# Patient Record
Sex: FEMALE | Race: WHITE | Employment: OTHER | URBAN - METROPOLITAN AREA
[De-identification: names, ages, dates, MRNs, and addresses within clinical notes are randomized per-mention and may not be internally consistent; named-entity substitution may affect disease eponyms.]

---

## 2017-01-26 ENCOUNTER — OFFICE VISIT (OUTPATIENT)
Dept: FAMILY MEDICINE CLINIC | Facility: CLINIC | Age: 32
End: 2017-01-26

## 2017-01-26 VITALS
HEIGHT: 63 IN | HEART RATE: 80 BPM | BODY MASS INDEX: 34.55 KG/M2 | WEIGHT: 195 LBS | DIASTOLIC BLOOD PRESSURE: 72 MMHG | SYSTOLIC BLOOD PRESSURE: 145 MMHG | TEMPERATURE: 97 F

## 2017-01-26 DIAGNOSIS — M25.522 LEFT ELBOW PAIN: ICD-10-CM

## 2017-01-26 DIAGNOSIS — N60.02 CYST, BREAST, LEFT: ICD-10-CM

## 2017-01-26 DIAGNOSIS — R59.9 SWOLLEN LYMPH NODES: Primary | ICD-10-CM

## 2017-01-26 PROCEDURE — 99212 OFFICE O/P EST SF 10 MIN: CPT | Performed by: FAMILY MEDICINE

## 2017-01-26 PROCEDURE — 99214 OFFICE O/P EST MOD 30 MIN: CPT | Performed by: FAMILY MEDICINE

## 2017-01-26 RX ORDER — DEXAMETHASONE 4 MG/1
4 TABLET ORAL
Qty: 4 TABLET | Refills: 0 | Status: SHIPPED | OUTPATIENT
Start: 2017-01-26 | End: 2017-01-30

## 2017-01-26 NOTE — PROGRESS NOTES
Patient ID: Christa Staley is a 32year old female. HPI  Patient presents with:  Lump: Neck, left side.  pt twin brother has thyroid problems   Arm Pain: left arm near elbow     She states for 3 days now she noticed a lump on the left side of her ne Cardiovascular: Normal rate, regular rhythm and normal heart sounds. Pulmonary/Chest: Effort normal and breath sounds normal. No respiratory distress. Musculoskeletal:   Left elbow: Full range of motion. No effusion of the elbow.   There is no redne

## 2017-01-30 ENCOUNTER — NURSE NAVIGATOR ENCOUNTER (OUTPATIENT)
Dept: HEMATOLOGY/ONCOLOGY | Facility: HOSPITAL | Age: 32
End: 2017-01-30

## 2017-01-30 NOTE — PROGRESS NOTES
Called to [facility] to discuss scheduling 6 month follow-up breast imaging, as ordered by Dr. Lafaye Dubin. Patient's cousin initially translated Navigator's introduction.   Navigator explained option for a call back with a  on the line, as [place]

## 2017-02-08 ENCOUNTER — HOSPITAL ENCOUNTER (OUTPATIENT)
Dept: ULTRASOUND IMAGING | Facility: HOSPITAL | Age: 32
Discharge: HOME | End: 2017-02-08
Attending: FAMILY MEDICINE
Payer: COMMERCIAL

## 2017-02-08 DIAGNOSIS — N60.02 CYST, BREAST, LEFT: ICD-10-CM

## 2017-02-08 PROCEDURE — 76642 ULTRASOUND BREAST LIMITED: CPT

## 2017-02-22 ENCOUNTER — TELEPHONE (OUTPATIENT)
Dept: INTERNAL MEDICINE CLINIC | Facility: CLINIC | Age: 32
End: 2017-02-22

## 2017-02-22 NOTE — TELEPHONE ENCOUNTER
Spoke with patient and relayed doctor message in regards to test results. Verbalized understanding. Patient asking if she should do a follow up mammogram in six months or in twelve months.  Please advise / Thank You

## 2017-02-22 NOTE — TELEPHONE ENCOUNTER
Should see me in August 2017 and we will order it at that time as you last had your regular mammogram done in July 2016.

## 2017-05-02 ENCOUNTER — HOSPITAL ENCOUNTER (OUTPATIENT)
Dept: GENERAL RADIOLOGY | Age: 32
Discharge: HOME | End: 2017-05-02
Attending: FAMILY MEDICINE
Payer: COMMERCIAL

## 2017-05-02 ENCOUNTER — OFFICE VISIT (OUTPATIENT)
Dept: FAMILY MEDICINE CLINIC | Facility: CLINIC | Age: 32
End: 2017-05-02

## 2017-05-02 VITALS
DIASTOLIC BLOOD PRESSURE: 81 MMHG | HEART RATE: 82 BPM | SYSTOLIC BLOOD PRESSURE: 118 MMHG | BODY MASS INDEX: 34 KG/M2 | WEIGHT: 192.88 LBS

## 2017-05-02 DIAGNOSIS — G56.21 CUBITAL CANAL COMPRESSION SYNDROME, RIGHT: ICD-10-CM

## 2017-05-02 DIAGNOSIS — G56.00 CARPAL TUNNEL SYNDROME, UNSPECIFIED LATERALITY: ICD-10-CM

## 2017-05-02 DIAGNOSIS — G56.00 CARPAL TUNNEL SYNDROME, UNSPECIFIED LATERALITY: Primary | ICD-10-CM

## 2017-05-02 PROBLEM — G56.20: Status: ACTIVE | Noted: 2017-05-02

## 2017-05-02 PROCEDURE — 73130 X-RAY EXAM OF HAND: CPT

## 2017-05-02 PROCEDURE — 99213 OFFICE O/P EST LOW 20 MIN: CPT | Performed by: FAMILY MEDICINE

## 2017-05-02 PROCEDURE — 99212 OFFICE O/P EST SF 10 MIN: CPT | Performed by: FAMILY MEDICINE

## 2017-05-02 PROCEDURE — 73080 X-RAY EXAM OF ELBOW: CPT

## 2017-05-02 NOTE — PROGRESS NOTES
Blood pressure 118/81, pulse 82, weight 192 lb 14.4 oz (87.499 kg), last menstrual period 04/19/2017, not currently breastfeeding. Patient presents today complaining of right pinky discomfort for the past 3 weeks.   She reports she is unable to extend her

## 2017-06-02 ENCOUNTER — TELEPHONE (OUTPATIENT)
Dept: FAMILY MEDICINE CLINIC | Facility: CLINIC | Age: 32
End: 2017-06-02

## 2017-06-02 ENCOUNTER — OFFICE VISIT (OUTPATIENT)
Dept: NEUROLOGY | Facility: CLINIC | Age: 32
End: 2017-06-02

## 2017-06-02 DIAGNOSIS — G56.20 CUBITAL TUNNEL SYNDROME, UNSPECIFIED LATERALITY: Primary | ICD-10-CM

## 2017-06-02 DIAGNOSIS — G56.21 ULNAR NEUROPATHY OF RIGHT UPPER EXTREMITY: Primary | ICD-10-CM

## 2017-06-02 DIAGNOSIS — G56.00 CARPAL TUNNEL SYNDROME, UNSPECIFIED LATERALITY: ICD-10-CM

## 2017-06-02 PROCEDURE — 95886 MUSC TEST DONE W/N TEST COMP: CPT | Performed by: OTHER

## 2017-06-02 PROCEDURE — 95911 NRV CNDJ TEST 9-10 STUDIES: CPT | Performed by: OTHER

## 2017-06-02 NOTE — PROCEDURES
[address]  Phone: [phone]  Fax: [phone] REPORT          Patient: Mil Alexander YOB: 1985  Patient ID: [id number] Hand Dominance: Nerve / Sites Muscle Latency Amplitude Durat Segments Distance Lat Diff Velocity     ms mV ms  cm ms m/s   R MEDIAN - APB      Wrist APB 3.23 6.5 6.93 Wrist - APB 8        Elbow APB 6.98 5.8 6.88 Elbow - Wrist 22.5 3.75 60   R ULNAR - ADM      Wrist ADM 2.

## 2017-06-02 NOTE — TELEPHONE ENCOUNTER
Pt calling states she is waiting in office but will not be seen with out referral for Dr Karol Larson. Pt states she will have test for hands.

## 2017-06-02 NOTE — TELEPHONE ENCOUNTER
Spoke to managed care and they stated that in order for pt to have emg she needed a referral entered in. Spoke to pt and is aware of process. She is awaiting a call back.      Dr. Zelalem Frazier,     Please sign off on pended approval.

## 2017-06-06 NOTE — TELEPHONE ENCOUNTER
Spoke to pt and she stated she was aware referral had been authorized and got her procedure done. No further action necessary.

## 2017-06-10 ENCOUNTER — TELEPHONE (OUTPATIENT)
Dept: FAMILY MEDICINE CLINIC | Facility: CLINIC | Age: 32
End: 2017-06-10

## 2017-06-10 NOTE — TELEPHONE ENCOUNTER
Libyan speaking pt-Pt is calling to inquire about her results. Pt states she got a call from nurse.  Please advise

## 2017-06-13 ENCOUNTER — OFFICE VISIT (OUTPATIENT)
Dept: FAMILY MEDICINE CLINIC | Facility: CLINIC | Age: 32
End: 2017-06-13

## 2017-06-13 VITALS
BODY MASS INDEX: 33 KG/M2 | HEART RATE: 87 BPM | DIASTOLIC BLOOD PRESSURE: 64 MMHG | TEMPERATURE: 98 F | WEIGHT: 189 LBS | SYSTOLIC BLOOD PRESSURE: 103 MMHG

## 2017-06-13 DIAGNOSIS — J02.9 SORE THROAT: ICD-10-CM

## 2017-06-13 DIAGNOSIS — R52 BODY ACHES: Primary | ICD-10-CM

## 2017-06-13 PROCEDURE — 87880 STREP A ASSAY W/OPTIC: CPT | Performed by: FAMILY MEDICINE

## 2017-06-13 PROCEDURE — 99213 OFFICE O/P EST LOW 20 MIN: CPT | Performed by: FAMILY MEDICINE

## 2017-06-13 RX ORDER — AMOXICILLIN 875 MG/1
875 TABLET, COATED ORAL 2 TIMES DAILY
Qty: 20 TABLET | Refills: 0 | Status: SHIPPED | OUTPATIENT
Start: 2017-06-13 | End: 2017-06-28 | Stop reason: ALTCHOICE

## 2017-06-13 NOTE — TELEPHONE ENCOUNTER
Notes Recorded by Hughes Harada, LPN on 6/4/1418 at 79:35 AM  Normal hand and elbow xray letter mailed.   ------    Notes Recorded by Tania Lara DO on 5/3/2017 at 1:18 PM  Elbow and hand x-rays normal.

## 2017-06-14 NOTE — PROGRESS NOTES
HPI:    Patient ID: Jacquelin Sarmiento is a 28year old female. HPI Comments: Throat pain, body aches  fever all since today      Review of Systems   Constitutional: Negative. Negative for chills, diaphoresis, activity change and appetite change.    HEN

## 2017-06-28 ENCOUNTER — OFFICE VISIT (OUTPATIENT)
Dept: FAMILY MEDICINE CLINIC | Facility: CLINIC | Age: 32
End: 2017-06-28

## 2017-06-28 VITALS
SYSTOLIC BLOOD PRESSURE: 139 MMHG | BODY MASS INDEX: 34 KG/M2 | DIASTOLIC BLOOD PRESSURE: 91 MMHG | HEART RATE: 81 BPM | WEIGHT: 190 LBS

## 2017-06-28 DIAGNOSIS — M65.351 TRIGGER LITTLE FINGER OF RIGHT HAND: Primary | ICD-10-CM

## 2017-06-28 PROCEDURE — 99213 OFFICE O/P EST LOW 20 MIN: CPT | Performed by: FAMILY MEDICINE

## 2017-06-28 PROCEDURE — 99212 OFFICE O/P EST SF 10 MIN: CPT | Performed by: FAMILY MEDICINE

## 2017-06-28 RX ORDER — LEVOCETIRIZINE DIHYDROCHLORIDE 5 MG/1
5 TABLET, FILM COATED ORAL EVERY EVENING
Qty: 30 TABLET | Refills: 4 | Status: SHIPPED | OUTPATIENT
Start: 2017-06-28 | End: 2017-08-17 | Stop reason: ALTCHOICE

## 2017-06-28 NOTE — PROGRESS NOTES
Blood pressure 139/91, pulse 81, weight 190 lb (86.2 kg), last menstrual period 06/11/2017, not currently breastfeeding. Patient presents today complaining of itchy rash for the past 2 weeks. Began shortly after starting amoxicillin.   No change in soap

## 2017-07-05 ENCOUNTER — OFFICE VISIT (OUTPATIENT)
Dept: FAMILY MEDICINE CLINIC | Facility: CLINIC | Age: 32
End: 2017-07-05

## 2017-07-05 VITALS
HEART RATE: 77 BPM | TEMPERATURE: 98 F | WEIGHT: 191 LBS | SYSTOLIC BLOOD PRESSURE: 129 MMHG | DIASTOLIC BLOOD PRESSURE: 81 MMHG | HEIGHT: 63 IN | BODY MASS INDEX: 33.84 KG/M2

## 2017-07-05 DIAGNOSIS — IMO0001 ALLERGY, URTICARIA: Primary | ICD-10-CM

## 2017-07-05 PROCEDURE — 99212 OFFICE O/P EST SF 10 MIN: CPT | Performed by: FAMILY MEDICINE

## 2017-07-05 PROCEDURE — 96372 THER/PROPH/DIAG INJ SC/IM: CPT | Performed by: FAMILY MEDICINE

## 2017-07-05 PROCEDURE — 99214 OFFICE O/P EST MOD 30 MIN: CPT | Performed by: FAMILY MEDICINE

## 2017-07-05 RX ORDER — MOMETASONE FUROATE 1 MG/G
CREAM TOPICAL
Qty: 30 G | Refills: 1 | Status: SHIPPED | OUTPATIENT
Start: 2017-07-05 | End: 2017-12-26

## 2017-07-05 RX ORDER — PREDNISONE 10 MG/1
10 TABLET ORAL DAILY
Qty: 12 TABLET | Refills: 0 | Status: SHIPPED | OUTPATIENT
Start: 2017-07-05 | End: 2017-07-17 | Stop reason: ALTCHOICE

## 2017-07-05 RX ORDER — METHYLPREDNISOLONE ACETATE 80 MG/ML
80 INJECTION, SUSPENSION INTRA-ARTICULAR; INTRALESIONAL; INTRAMUSCULAR; SOFT TISSUE ONCE
Status: COMPLETED | OUTPATIENT
Start: 2017-07-05 | End: 2017-07-05

## 2017-07-05 RX ADMIN — METHYLPREDNISOLONE ACETATE 80 MG: 80 INJECTION, SUSPENSION INTRA-ARTICULAR; INTRALESIONAL; INTRAMUSCULAR; SOFT TISSUE at 08:43:00

## 2017-07-05 NOTE — PROGRESS NOTES
7/5/2017  8:27 AM    Adriano Helms is a 28year old female. Chief complaint(s): Patient presents with: Allergies: pt c/o itchiness all over body X 1 month    HPI:     Adriano Helms primary complaint is regarding rash.      Adriano Helms i Constitutional: Negative for chills, fatigue and fever. HENT: Negative for ear pain and sore throat. Respiratory: Negative for cough and wheezing. Cardiovascular: Negative for chest pain and palpitations.    Gastrointestinal: Negative for nausea, Disp Refills    predniSONE 10 MG Oral Tab 12 tablet 0      Sig: Take 1 tablet (10 mg total) by mouth daily.       Mometasone Furoate 0.1 % External Cream 30 g 1      Sig: Apply to affected area(s) BID       DepoMedrol 80 mg IM, Xyzal    RECOMMENDATIONS give

## 2017-07-17 ENCOUNTER — LAB ENCOUNTER (OUTPATIENT)
Dept: LAB | Age: 32
End: 2017-07-17
Attending: FAMILY MEDICINE
Payer: COMMERCIAL

## 2017-07-17 ENCOUNTER — OFFICE VISIT (OUTPATIENT)
Dept: FAMILY MEDICINE CLINIC | Facility: CLINIC | Age: 32
End: 2017-07-17

## 2017-07-17 VITALS
BODY MASS INDEX: 33.49 KG/M2 | TEMPERATURE: 99 F | DIASTOLIC BLOOD PRESSURE: 73 MMHG | HEIGHT: 63 IN | SYSTOLIC BLOOD PRESSURE: 123 MMHG | HEART RATE: 77 BPM | WEIGHT: 189 LBS

## 2017-07-17 DIAGNOSIS — IMO0001 ALLERGY, URTICARIA: ICD-10-CM

## 2017-07-17 DIAGNOSIS — N63.0 BREAST MASS: ICD-10-CM

## 2017-07-17 DIAGNOSIS — N60.12 FIBROCYSTIC DISEASE OF BOTH BREASTS: ICD-10-CM

## 2017-07-17 DIAGNOSIS — N60.11 FIBROCYSTIC DISEASE OF BOTH BREASTS: ICD-10-CM

## 2017-07-17 DIAGNOSIS — IMO0001 ALLERGY, URTICARIA: Primary | ICD-10-CM

## 2017-07-17 PROCEDURE — 86003 ALLG SPEC IGE CRUDE XTRC EA: CPT

## 2017-07-17 PROCEDURE — 99212 OFFICE O/P EST SF 10 MIN: CPT | Performed by: FAMILY MEDICINE

## 2017-07-17 PROCEDURE — 99214 OFFICE O/P EST MOD 30 MIN: CPT | Performed by: FAMILY MEDICINE

## 2017-07-17 PROCEDURE — 82785 ASSAY OF IGE: CPT

## 2017-07-17 NOTE — PROGRESS NOTES
7/17/2017  4:39 PM    Amarilis Watters is a 28year old female. Chief complaint(s): Patient presents with:  Breast Lump    HPI:     Amarilis Watters primary complaint is regarding breast mass and rash.      Patient presents complaining of left breast of Breath      ROS:   Review of Systems   Constitutional: Negative for chills, fatigue and fever. HENT: Negative for ear pain and sore throat. Respiratory: Negative for cough and wheezing. Cardiovascular: Negative for chest pain and palpitations. diagnosis)  Breast mass  Fibrocystic disease of both breasts    1. Allergy, urticaria  MEDICATIONS: CPM    LABORATORY & ORDERS:   Orders Placed This Encounter      Chiquita Norwood. Reg.  Prof  envirmental [E]      Allergy Adult Foof Profile, Reflex [E]

## 2017-07-18 LAB
A ALTERNATA IGE QN: <0.1 KUA/L (ref ?–0.1)
C HERBARUM IGE QN: <0.1 KUA/L (ref ?–0.1)
CAT DANDER IGE QN: <0.1 KUA/L (ref ?–0.1)
CLAM IGE QN: <0.1 KUA/L (ref ?–0.1)
CODFISH IGE QN: <0.1 KUA/L (ref ?–0.1)
COMMON RAGWEED IGE QN: <0.1 KUA/L (ref ?–0.1)
CORN IGE QN: <0.1 KUA/L (ref ?–0.1)
COW MILK IGE QN: <0.1 KUA/L (ref ?–0.1)
D FARINAE IGE QN: <0.1 KUA/L (ref ?–0.1)
DOG DANDER IGE QN: <0.1 KUA/L (ref ?–0.1)
EGG WHITE IGE QN: <0.1 KUA/L (ref ?–0.1)
GOOSEFOOT IGE QN: <0.1 KUA/L (ref ?–0.1)
HOUSE DUST HS IGE QN: <0.1 KUA/L (ref ?–0.1)
IGE SERPL-ACNC: 11.6 KU/L (ref 2–214)
IGE SERPL-ACNC: 11.6 KU/L (ref 2–214)
KENT BLUE GRASS IGE QN: <0.1 KUA/L (ref ?–0.1)
PEANUT IGE QN: <0.1 KUA/L (ref ?–0.1)
PER RYE GRASS IGE QN: <0.1 KUA/L (ref ?–0.1)
SCALLOP IGE QN: <0.1 KUA/L (ref ?–0.1)
SESAME SEED IGE QN: <0.1 KUA/L (ref ?–0.1)
SHRIMP IGE QN: <0.1 KUA/L (ref ?–0.1)
SOYBEAN IGE QN: <0.1 KUA/L (ref ?–0.1)
WALNUT IGE QN: <0.1 KUA/L (ref ?–0.1)
WHEAT IGE QN: <0.1 KUA/L (ref ?–0.1)
WHITE ELM IGE QN: <0.1 KUA/L (ref ?–0.1)
WHITE OAK IGE QN: <0.1 KUA/L (ref ?–0.1)

## 2017-07-20 ENCOUNTER — TELEPHONE (OUTPATIENT)
Dept: FAMILY MEDICINE CLINIC | Facility: CLINIC | Age: 32
End: 2017-07-20

## 2017-07-20 NOTE — TELEPHONE ENCOUNTER
----- Message from Yeny Tirado MD sent at 7/18/2017  7:07 PM CDT -----  Please call patient, results are within normal limits.

## 2017-07-27 ENCOUNTER — HOSPITAL ENCOUNTER (OUTPATIENT)
Dept: ULTRASOUND IMAGING | Facility: HOSPITAL | Age: 32
Discharge: HOME | End: 2017-07-27
Attending: FAMILY MEDICINE
Payer: COMMERCIAL

## 2017-07-27 DIAGNOSIS — N60.12 FIBROCYSTIC DISEASE OF BOTH BREASTS: ICD-10-CM

## 2017-07-27 DIAGNOSIS — N60.11 FIBROCYSTIC DISEASE OF BOTH BREASTS: ICD-10-CM

## 2017-07-27 DIAGNOSIS — N63.0 BREAST MASS: ICD-10-CM

## 2017-07-27 PROCEDURE — 76642 ULTRASOUND BREAST LIMITED: CPT | Performed by: FAMILY MEDICINE

## 2017-08-04 ENCOUNTER — TELEPHONE (OUTPATIENT)
Dept: FAMILY MEDICINE CLINIC | Facility: CLINIC | Age: 32
End: 2017-08-04

## 2017-08-04 ENCOUNTER — HOSPITAL ENCOUNTER (OUTPATIENT)
Age: 32
Discharge: HOME | End: 2017-08-04
Attending: EMERGENCY MEDICINE
Payer: COMMERCIAL

## 2017-08-04 VITALS
TEMPERATURE: 98 F | HEART RATE: 69 BPM | OXYGEN SATURATION: 100 % | WEIGHT: 188 LBS | HEIGHT: 64 IN | SYSTOLIC BLOOD PRESSURE: 133 MMHG | BODY MASS INDEX: 32.1 KG/M2 | DIASTOLIC BLOOD PRESSURE: 83 MMHG | RESPIRATION RATE: 16 BRPM

## 2017-08-04 DIAGNOSIS — R30.0 DYSURIA: ICD-10-CM

## 2017-08-04 DIAGNOSIS — N30.90 CYSTITIS: Primary | ICD-10-CM

## 2017-08-04 DIAGNOSIS — R30.0 DYSURIA: Primary | ICD-10-CM

## 2017-08-04 LAB
B-HCG UR QL: NEGATIVE
URINE BILIRUBIN: NEGATIVE
URINE GLUCOSE: NEGATIVE MG/DL
URINE KETONES: NEGATIVE MG/DL
URINE NITRITE: NEGATIVE
URINE PH: 8.5
URINE PROTEIN: NEGATIVE MG /DL
URINE SPECIFIC GRAVITY: 1.01

## 2017-08-04 PROCEDURE — 99214 OFFICE O/P EST MOD 30 MIN: CPT

## 2017-08-04 PROCEDURE — 81025 URINE PREGNANCY TEST: CPT

## 2017-08-04 PROCEDURE — 81002 URINALYSIS NONAUTO W/O SCOPE: CPT

## 2017-08-04 PROCEDURE — 87086 URINE CULTURE/COLONY COUNT: CPT | Performed by: EMERGENCY MEDICINE

## 2017-08-04 RX ORDER — CIPROFLOXACIN 500 MG/1
500 TABLET, FILM COATED ORAL 2 TIMES DAILY
Qty: 14 TABLET | Refills: 0 | Status: SHIPPED | OUTPATIENT
Start: 2017-08-04 | End: 2017-08-11 | Stop reason: ALTCHOICE

## 2017-08-04 RX ORDER — PHENAZOPYRIDINE HYDROCHLORIDE 200 MG/1
200 TABLET, FILM COATED ORAL 3 TIMES DAILY
Qty: 6 TABLET | Refills: 0 | Status: SHIPPED | OUTPATIENT
Start: 2017-08-04 | End: 2017-08-06

## 2017-08-04 NOTE — TELEPHONE ENCOUNTER
Received  [facility] orders via [facility] to order stat urinalysis and culture. Labs generated and informed pt on md orders. She will complete them at [facility] Lab dept. Tasked to  [facility] for signoff.

## 2017-08-04 NOTE — ED INITIAL ASSESSMENT (HPI)
Called office and was told to drop urine off at lab fo u/a. Complained for dysuria for 2 days. Decided to come here now she has abd pain. Told md she had hematuria. And would call with results. No acute apparent distress.

## 2017-08-04 NOTE — ED PROVIDER NOTES
Patient presents with:  Abdomen/Flank Pain (GI/)      HPI:     Christa Staley is a 28year old female who presents with a chief complaint of an increased frequency of urination. She has had rare past episodes of UTI. Onset of symptoms was yesterday. HCl 200 MG Oral Tab          Sig: Take 1 tablet (200 mg total) by mouth 3 (three) times daily.           Dispense:  6 tablet          Refill:  0    Labs performed this visit:    Recent Results (from the past 10 hour(s))  -[facility] POCT URINALYSIS DIPSTICK MANUAL

## 2017-08-04 NOTE — TELEPHONE ENCOUNTER
FYI - Turkish Speaking    Patient called in wanting to make an appointment with Dr. Ed Genao - out of the office, other physicians are completely booked. Patient stated she woke up feeling sick - mentioned that she is also having problems urinating.  Please advis

## 2017-08-04 NOTE — TELEPHONE ENCOUNTER
Actions Requested: Pt needs appt for urinary issues. Problem: Dysuria, urgency, frequency with lite pink urine. Onset and Timin days. Associated Symptoms: N/A  Aggravating by: N/A  Alleviated by: Nothing s/sx worsening.   Triage Note: Pt c/o 2 days d

## 2017-08-04 NOTE — ED NOTES
Urine culture obtained and sent to lab. Discharge inst reviewed and increase po fluids void after sex [facility] call and follow up with pcp in 3 days fill and take prescriptions. Go to the ed for new or worse concerns abd pain fever .

## 2017-08-07 NOTE — TELEPHONE ENCOUNTER
Urinalysis results in epic, but culture not seen. I spoke with Mariette Opitz in reference lab and she will fax lab results to [facility] fax [phone]. Tasked to [facility] for  [facility].

## 2017-08-11 ENCOUNTER — OFFICE VISIT (OUTPATIENT)
Dept: FAMILY MEDICINE CLINIC | Facility: CLINIC | Age: 32
End: 2017-08-11

## 2017-08-11 ENCOUNTER — LAB ENCOUNTER (OUTPATIENT)
Dept: LAB | Age: 32
End: 2017-08-11
Attending: FAMILY MEDICINE
Payer: COMMERCIAL

## 2017-08-11 ENCOUNTER — APPOINTMENT (OUTPATIENT)
Dept: LAB | Age: 32
End: 2017-08-11
Attending: FAMILY MEDICINE
Payer: COMMERCIAL

## 2017-08-11 VITALS
WEIGHT: 189 LBS | BODY MASS INDEX: 33.49 KG/M2 | SYSTOLIC BLOOD PRESSURE: 124 MMHG | TEMPERATURE: 98 F | DIASTOLIC BLOOD PRESSURE: 81 MMHG | HEART RATE: 69 BPM | HEIGHT: 63 IN

## 2017-08-11 DIAGNOSIS — Z11.3 SCREEN FOR STD (SEXUALLY TRANSMITTED DISEASE): ICD-10-CM

## 2017-08-11 DIAGNOSIS — Z00.00 PHYSICAL EXAM: ICD-10-CM

## 2017-08-11 DIAGNOSIS — N63.0 BREAST MASS: ICD-10-CM

## 2017-08-11 DIAGNOSIS — Z00.00 PHYSICAL EXAM: Primary | ICD-10-CM

## 2017-08-11 LAB
ALBUMIN SERPL BCP-MCNC: 4.1 G/DL (ref 3.5–4.8)
ALBUMIN/GLOB SERPL: 1.3 {RATIO} (ref 1–2)
ALP SERPL-CCNC: 68 U/L (ref 32–100)
ALT SERPL-CCNC: 17 U/L (ref 14–54)
ANION GAP SERPL CALC-SCNC: 6 MMOL/L (ref 0–18)
AST SERPL-CCNC: 16 U/L (ref 15–41)
BASOPHILS # BLD: 0 K/UL (ref 0–0.2)
BASOPHILS NFR BLD: 1 %
BILIRUB SERPL-MCNC: 0.5 MG/DL (ref 0.3–1.2)
BILIRUB UR QL: NEGATIVE
BUN SERPL-MCNC: 7 MG/DL (ref 8–20)
BUN/CREAT SERPL: 10.4 (ref 10–20)
CALCIUM SERPL-MCNC: 9.3 MG/DL (ref 8.5–10.5)
CHLORIDE SERPL-SCNC: 103 MMOL/L (ref 95–110)
CHOLEST SERPL-MCNC: 218 MG/DL (ref 110–200)
CO2 SERPL-SCNC: 26 MMOL/L (ref 22–32)
COLOR UR: YELLOW
CREAT SERPL-MCNC: 0.67 MG/DL (ref 0.5–1.5)
EOSINOPHIL # BLD: 0.1 K/UL (ref 0–0.7)
EOSINOPHIL NFR BLD: 2 %
ERYTHROCYTE [DISTWIDTH] IN BLOOD BY AUTOMATED COUNT: 13.1 % (ref 11–15)
GLOBULIN PLAS-MCNC: 3.2 G/DL (ref 2.5–3.7)
GLUCOSE SERPL-MCNC: 108 MG/DL (ref 70–99)
GLUCOSE UR-MCNC: NEGATIVE MG/DL
HAV AB SER QL IA: REACTIVE
HBA1C MFR BLD: 5.4 % (ref 4–6)
HBV CORE AB SERPL QL IA: NONREACTIVE
HBV SURFACE AB SER-ACNC: <3.1 MIU/ML (ref ?–10)
HBV SURFACE AG SERPL QL IA: NONREACTIVE
HBV SURFACE AG SERPL QL IA: NONREACTIVE
HCT VFR BLD AUTO: 38.3 % (ref 35–48)
HCV AB SERPL QL IA: NONREACTIVE
HDLC SERPL-MCNC: 41 MG/DL
HGB BLD-MCNC: 13.2 G/DL (ref 12–16)
HGB UR QL STRIP.AUTO: NEGATIVE
HIV1+2 AB SERPL QL IA: NONREACTIVE
KETONES UR-MCNC: NEGATIVE MG/DL
LDLC SERPL CALC-MCNC: 153 MG/DL (ref 0–99)
LEUKOCYTE ESTERASE UR QL STRIP.AUTO: NEGATIVE
LYMPHOCYTES # BLD: 1.8 K/UL (ref 1–4)
LYMPHOCYTES NFR BLD: 29 %
MCH RBC QN AUTO: 30.9 PG (ref 27–32)
MCHC RBC AUTO-ENTMCNC: 34.5 G/DL (ref 32–37)
MCV RBC AUTO: 89.6 FL (ref 80–100)
MONOCYTES # BLD: 0.4 K/UL (ref 0–1)
MONOCYTES NFR BLD: 6 %
NEUTROPHILS # BLD AUTO: 3.9 K/UL (ref 1.8–7.7)
NEUTROPHILS NFR BLD: 63 %
NITRITE UR QL STRIP.AUTO: NEGATIVE
NONHDLC SERPL-MCNC: 177 MG/DL
OSMOLALITY UR CALC.SUM OF ELEC: 279 MOSM/KG (ref 275–295)
PH UR: 7 [PH] (ref 5–8)
PLATELET # BLD AUTO: 293 K/UL (ref 140–400)
PMV BLD AUTO: 8.5 FL (ref 7.4–10.3)
POTASSIUM SERPL-SCNC: 3.8 MMOL/L (ref 3.3–5.1)
PROT SERPL-MCNC: 7.3 G/DL (ref 5.9–8.4)
PROT UR-MCNC: 30 MG/DL
RBC # BLD AUTO: 4.28 M/UL (ref 3.7–5.4)
RBC #/AREA URNS AUTO: 0 /HPF
RBC #/AREA URNS AUTO: <1 /HPF
SODIUM SERPL-SCNC: 135 MMOL/L (ref 136–144)
SP GR UR STRIP: 1.02 (ref 1–1.03)
TRIGL SERPL-MCNC: 122 MG/DL (ref 1–149)
TSH SERPL-ACNC: 0.78 UIU/ML (ref 0.45–5.33)
UROBILINOGEN UR STRIP-ACNC: <2
VIT C UR-MCNC: NEGATIVE MG/DL
WBC # BLD AUTO: 6.2 K/UL (ref 4–11)
WBC #/AREA URNS AUTO: 1 /HPF
WBC #/AREA URNS AUTO: <1 /HPF

## 2017-08-11 PROCEDURE — 99395 PREV VISIT EST AGE 18-39: CPT | Performed by: FAMILY MEDICINE

## 2017-08-11 PROCEDURE — 36415 COLL VENOUS BLD VENIPUNCTURE: CPT

## 2017-08-11 PROCEDURE — 84443 ASSAY THYROID STIM HORMONE: CPT

## 2017-08-11 PROCEDURE — 87340 HEPATITIS B SURFACE AG IA: CPT

## 2017-08-11 PROCEDURE — 87389 HIV-1 AG W/HIV-1&-2 AB AG IA: CPT

## 2017-08-11 PROCEDURE — 86696 HERPES SIMPLEX TYPE 2 TEST: CPT

## 2017-08-11 PROCEDURE — 93005 ELECTROCARDIOGRAM TRACING: CPT

## 2017-08-11 PROCEDURE — 81015 MICROSCOPIC EXAM OF URINE: CPT | Performed by: FAMILY MEDICINE

## 2017-08-11 PROCEDURE — 86704 HEP B CORE ANTIBODY TOTAL: CPT

## 2017-08-11 PROCEDURE — 80500 HEPATITIS A B + C PROFILE: CPT

## 2017-08-11 PROCEDURE — 81001 URINALYSIS AUTO W/SCOPE: CPT

## 2017-08-11 PROCEDURE — 86706 HEP B SURFACE ANTIBODY: CPT

## 2017-08-11 PROCEDURE — 80061 LIPID PANEL: CPT

## 2017-08-11 PROCEDURE — 86695 HERPES SIMPLEX TYPE 1 TEST: CPT

## 2017-08-11 PROCEDURE — 86780 TREPONEMA PALLIDUM: CPT

## 2017-08-11 PROCEDURE — 86709 HEPATITIS A IGM ANTIBODY: CPT

## 2017-08-11 PROCEDURE — 80053 COMPREHEN METABOLIC PANEL: CPT

## 2017-08-11 PROCEDURE — 86694 HERPES SIMPLEX NES ANTBDY: CPT

## 2017-08-11 PROCEDURE — 93010 ELECTROCARDIOGRAM REPORT: CPT | Performed by: FAMILY MEDICINE

## 2017-08-11 PROCEDURE — 86708 HEPATITIS A ANTIBODY: CPT

## 2017-08-11 PROCEDURE — 86803 HEPATITIS C AB TEST: CPT

## 2017-08-11 PROCEDURE — 83036 HEMOGLOBIN GLYCOSYLATED A1C: CPT

## 2017-08-11 PROCEDURE — 85025 COMPLETE CBC W/AUTO DIFF WBC: CPT

## 2017-08-11 NOTE — PROGRESS NOTES
8/11/2017  9:02 AM    Marcy Lowery is a 28year old female. Chief complaint(s): Patient presents with:  Routine Physical  Gyn Exam    HPI:     Marcy Lowery primary complaint is regarding CPE.      Marcy Lowery is a 28year old female pr Allergies:    Amoxicillin             Rash  Ibuprofen               Shortness of Breath      ROS:   Review of Systems   Constitutional: Negative for appetite change, chills, diaphoresis, fatigue and fever.    HENT: Negative for ear pain, hearing loss, rhythm and S2 normal.  Exam reveals no gallop and no friction rub. No murmur heard. Pulmonary/Chest: No respiratory distress. Right breast exhibits no mass, no nipple discharge and no skin change. Left breast exhibits mass and tenderness.  Left breast e (A) Clear   Glucose, Urine Negative Negative mg/dL   Bilirubin, Urine Negative Negative   Ketone, Urine Negative Negative mg/dL   Specific Gravity, Urine 1.010 1.005 - 1.030   Blood, Urine Large (A) Negative   PH, Urine 8.5 (A) 5.0 - 8.0   Protein urine Ne mass     Normal recent mammo/breast ultrasound studies.   1. Physical exam  Assessment and Plan:     [address] checkup as follows:    LABORATORY & ORDERS:   Orders Placed This Encounter      -II [E]      CBC W Differential W Platelet [ Although recommended, the patient refuses the following: none . FOLLOW-UP: Schedule a follow-up visit in 12 months.            Orders This Visit:    Orders Placed This Encounter      -II [E]      CBC W Differential W Platelet [E]      Comp [employer]

## 2017-08-12 LAB
HAV IGM SERPL QL IA: NONREACTIVE
HSV 1 GLYCOPROTEIN G AB, IGG: 33.8 IV
HSV 2 GLYCOPROTEIN G AB, IGG: 3.62 IV

## 2017-08-12 NOTE — PROGRESS NOTES
Please notify patient that his/her cholesterol levels were slight elevated. However, it only requires to follow a low cholesterol / low fat diet, exercise and recheck in 12 months.

## 2017-08-13 LAB
C TRACH DNA SPEC QL NAA+PROBE: NEGATIVE
HSV TYPE 1/2 COMBINED AB, IGG: >22.4 IV
HSV TYPE 1/2 COMBINED ABS, IGM: 0.86 IV
N GONORRHOEA DNA SPEC QL NAA+PROBE: NEGATIVE

## 2017-08-14 LAB
HPV I/H RISK 1 DNA SPEC QL NAA+PROBE: NEGATIVE
T PALLIDUM AB SER QL: NEGATIVE

## 2017-08-17 ENCOUNTER — APPOINTMENT (OUTPATIENT)
Dept: LAB | Age: 32
End: 2017-08-17
Attending: FAMILY MEDICINE
Payer: COMMERCIAL

## 2017-08-17 ENCOUNTER — OFFICE VISIT (OUTPATIENT)
Dept: FAMILY MEDICINE CLINIC | Facility: CLINIC | Age: 32
End: 2017-08-17

## 2017-08-17 VITALS
HEIGHT: 63 IN | DIASTOLIC BLOOD PRESSURE: 67 MMHG | HEART RATE: 67 BPM | BODY MASS INDEX: 33.31 KG/M2 | TEMPERATURE: 98 F | SYSTOLIC BLOOD PRESSURE: 125 MMHG | WEIGHT: 188 LBS

## 2017-08-17 DIAGNOSIS — E78.00 PURE HYPERCHOLESTEROLEMIA: Primary | ICD-10-CM

## 2017-08-17 DIAGNOSIS — B00.9 HSV INFECTION: ICD-10-CM

## 2017-08-17 PROCEDURE — 86304 IMMUNOASSAY TUMOR CA 125: CPT | Performed by: FAMILY MEDICINE

## 2017-08-17 PROCEDURE — 99214 OFFICE O/P EST MOD 30 MIN: CPT | Performed by: FAMILY MEDICINE

## 2017-08-17 PROCEDURE — 99212 OFFICE O/P EST SF 10 MIN: CPT | Performed by: FAMILY MEDICINE

## 2017-08-17 RX ORDER — CIPROFLOXACIN 500 MG/1
TABLET, FILM COATED ORAL
Refills: 0 | COMMUNITY
Start: 2017-08-04 | End: 2017-12-26

## 2017-08-17 NOTE — PROGRESS NOTES
8/17/2017  4:32 PM    Fabrice Gerber is a 28year old female.     Chief complaint(s): Patient presents with:  Test Results: Patient here to discuss her results from her annual physical    HPI:     Fabrice Gerber primary complaint is regarding abnl l Oral Tab TK 1 T PO BID Disp:  Rfl: 0       Allergies:    Amoxicillin             Rash  Ibuprofen               Shortness of Breath      ROS:   Review of Systems   Constitutional: Negative for chills, fatigue and fever.    HENT: Negative for ear pain and sor placed or performed in visit on 99/20/77  -COMP METABOLIC PANEL (14)   Result Value Ref Range   Glucose 108 (H) 70 - 99 mg/dL   Sodium 135 (L) 136 - 144 mmol/L   Potassium 3.8 3.3 - 5.1 mmol/L   Chloride 103 95 - 110 mmol/L   CO2 26 22 - 32 mmol/L   BUN 7 Hepatitis B Core Antibody Nonreactive  Nonreactive    Hepatitis B Surface AB Qual Nonreactive  Nonreactive    Hepatitis B Surface AB Quant <3.10 <10.00 mIU/mL   Hepatitis C Virus Antibody Nonreactive  Nonreactive   -HERPES SIMPLEX 1/2, IGM/IGG W/RFLX GLY taker the medication nightly. Weight reduction plan was discussed. Furthermore, patient was informed to call our office with any questions or concerns.  Notify Dr Chilango Panda or the [facility] if there is a deterioration or worsening of the medical condit

## 2017-08-18 LAB — CANCER AG125 SERPL-ACNC: 21 U/ML (ref 0–35)

## 2017-12-20 ENCOUNTER — NURSE TRIAGE (OUTPATIENT)
Dept: OTHER | Age: 32
End: 2017-12-20

## 2017-12-20 NOTE — TELEPHONE ENCOUNTER
Action Requested: Summary for Provider     []  Critical Lab, Recommendations Needed  [] Need Additional Advice  [x]   FYI    []   Need Orders  [] Need Medications Sent to Pharmacy  []  Other     SUMMARY: Advised ER per protocol and pt agrees.  Will be drive

## 2017-12-21 NOTE — TELEPHONE ENCOUNTER
Pt stated that she did not go to ER. But she will go now as she still has the pain.  Thanks  RN pls f/u tomorrow

## 2017-12-23 NOTE — TELEPHONE ENCOUNTER
F/u call made. Patient went to  [facility] ER. Labs done, and normal. DX with Gastritis.  (per patient she tried prilosec and not effective.) was prescribed Tramadol for pain. Recommend f/u appt to re-evaluate and possible referral to GI.   Kaila

## 2017-12-26 ENCOUNTER — APPOINTMENT (OUTPATIENT)
Dept: LAB | Age: 32
End: 2017-12-26
Attending: FAMILY MEDICINE
Payer: COMMERCIAL

## 2017-12-26 ENCOUNTER — OFFICE VISIT (OUTPATIENT)
Dept: FAMILY MEDICINE CLINIC | Facility: CLINIC | Age: 32
End: 2017-12-26

## 2017-12-26 VITALS
TEMPERATURE: 98 F | BODY MASS INDEX: 32.96 KG/M2 | SYSTOLIC BLOOD PRESSURE: 121 MMHG | DIASTOLIC BLOOD PRESSURE: 72 MMHG | HEIGHT: 63 IN | WEIGHT: 186 LBS | HEART RATE: 79 BPM

## 2017-12-26 DIAGNOSIS — R10.13 EPIGASTRIC ABDOMINAL PAIN: Primary | ICD-10-CM

## 2017-12-26 DIAGNOSIS — R10.13 EPIGASTRIC ABDOMINAL PAIN: ICD-10-CM

## 2017-12-26 DIAGNOSIS — K44.9 HIATAL HERNIA: ICD-10-CM

## 2017-12-26 PROCEDURE — 99212 OFFICE O/P EST SF 10 MIN: CPT | Performed by: FAMILY MEDICINE

## 2017-12-26 PROCEDURE — 99214 OFFICE O/P EST MOD 30 MIN: CPT | Performed by: FAMILY MEDICINE

## 2017-12-26 PROCEDURE — 83013 H PYLORI (C-13) BREATH: CPT

## 2017-12-26 RX ORDER — OMEPRAZOLE 40 MG/1
40 CAPSULE, DELAYED RELEASE ORAL DAILY
Qty: 90 CAPSULE | Refills: 0 | Status: SHIPPED | OUTPATIENT
Start: 2017-12-26 | End: 2018-07-25

## 2017-12-26 RX ORDER — ACETAMINOPHEN 500 MG
650 TABLET ORAL EVERY 6 HOURS PRN
COMMUNITY
End: 2018-07-25

## 2017-12-26 NOTE — PROGRESS NOTES
Patient ID: Kassidy Perdomo is a 28year old female.     HPI  Patient presents with:  ER F/U: gastritis    [place] RN      []Manual[]Template  []Copied  Action Requested: Summary for Provider      []  Critical Lab, Recommendations Needed She denies any pain in the back. In 2015 she had a CAT scan. It showed small hiatal hernia versus esophageal dysmotility and/or acid reflux.       Wt Readings from Last 6 Encounters:  12/26/17 : 186 lb (84.4 kg)  08/17/17 : 188 lb (85.3 kg)  08/11/17 : Abdominal: Normal appearance and bowel sounds are normal. There is positive tenderness in the epigastric area only. There is no rigidity, no rebound, no guarding and negative  Aviles's sign.    Neurological: Patient is alert and oriented to person, place,

## 2017-12-30 NOTE — H&P
[address] [facility] [address] Gastroenterology                                                                                                  Clinic History and Physical     Pa ASA    History, Medications, Allergies, ROS:      History reviewed. No pertinent past medical history.    Past Surgical History:  2005:   2012: ELECTROCARDIOGRAM, COMPLETE      Comment: scanned to media   Family Hx:   Family History   Problem moist  CV: regular rate and rhythm  Lung: moves air well; no labored breathing  Abdomen: soft, obese however no distension appreciated, mild RUQ ttp > epigastric ttp without rigidity or guarding, +NABS  Back: No CVA tenderness  Skin: dry, warm, no jaundice Referrals:  US ABDOMEN COMPLETE ([id number]=[id number])       Amie Solis PA-C  1/2/2018

## 2018-01-02 ENCOUNTER — APPOINTMENT (OUTPATIENT)
Dept: LAB | Facility: HOSPITAL | Age: 33
End: 2018-01-02
Attending: PHYSICIAN ASSISTANT
Payer: COMMERCIAL

## 2018-01-02 ENCOUNTER — OFFICE VISIT (OUTPATIENT)
Dept: GASTROENTEROLOGY | Facility: CLINIC | Age: 33
End: 2018-01-02

## 2018-01-02 VITALS
BODY MASS INDEX: 32.03 KG/M2 | SYSTOLIC BLOOD PRESSURE: 117 MMHG | HEART RATE: 79 BPM | WEIGHT: 187.63 LBS | DIASTOLIC BLOOD PRESSURE: 74 MMHG | HEIGHT: 64 IN

## 2018-01-02 DIAGNOSIS — R10.13 EPIGASTRIC PAIN: ICD-10-CM

## 2018-01-02 DIAGNOSIS — R12 HEARTBURN: ICD-10-CM

## 2018-01-02 DIAGNOSIS — R10.13 EPIGASTRIC PAIN: Primary | ICD-10-CM

## 2018-01-02 LAB
ALBUMIN SERPL BCP-MCNC: 4.2 G/DL (ref 3.5–4.8)
ALP SERPL-CCNC: 71 U/L (ref 32–100)
ALT SERPL-CCNC: 17 U/L (ref 14–54)
AST SERPL-CCNC: 16 U/L (ref 15–41)
BILIRUB DIRECT SERPL-MCNC: 0 MG/DL (ref 0–0.2)
BILIRUB SERPL-MCNC: 0.5 MG/DL (ref 0.3–1.2)
PROT SERPL-MCNC: 7.8 G/DL (ref 5.9–8.4)

## 2018-01-02 PROCEDURE — 99244 OFF/OP CNSLTJ NEW/EST MOD 40: CPT | Performed by: PHYSICIAN ASSISTANT

## 2018-01-02 PROCEDURE — 80076 HEPATIC FUNCTION PANEL: CPT

## 2018-01-02 PROCEDURE — 99212 OFFICE O/P EST SF 10 MIN: CPT | Performed by: PHYSICIAN ASSISTANT

## 2018-01-02 PROCEDURE — 36415 COLL VENOUS BLD VENIPUNCTURE: CPT

## 2018-01-02 RX ORDER — SUCRALFATE 1 G/1
1 TABLET ORAL
Qty: 120 TABLET | Refills: 0 | Status: SHIPPED | OUTPATIENT
Start: 2018-01-02 | End: 2018-02-28

## 2018-01-10 ENCOUNTER — HOSPITAL ENCOUNTER (OUTPATIENT)
Dept: ULTRASOUND IMAGING | Age: 33
Discharge: HOME | End: 2018-01-10
Attending: PHYSICIAN ASSISTANT
Payer: COMMERCIAL

## 2018-01-10 DIAGNOSIS — R10.13 EPIGASTRIC PAIN: ICD-10-CM

## 2018-01-10 PROCEDURE — 76700 US EXAM ABDOM COMPLETE: CPT | Performed by: PHYSICIAN ASSISTANT

## 2018-01-12 ENCOUNTER — TELEPHONE (OUTPATIENT)
Dept: GASTROENTEROLOGY | Facility: CLINIC | Age: 33
End: 2018-01-12

## 2018-01-12 NOTE — TELEPHONE ENCOUNTER
Discussed with patient over the phone about the results of [address]. Uncertain sludge, she is feeling better as long as she does not drink too much coffee as she previously did.  She has an appointment to see me on 1/30 and will CPM and return at that time for fu

## 2018-02-28 ENCOUNTER — HOSPITAL ENCOUNTER (OUTPATIENT)
Age: 33
Discharge: HOME | End: 2018-02-28
Attending: EMERGENCY MEDICINE
Payer: COMMERCIAL

## 2018-02-28 VITALS
TEMPERATURE: 98 F | HEART RATE: 71 BPM | BODY MASS INDEX: 31 KG/M2 | SYSTOLIC BLOOD PRESSURE: 122 MMHG | WEIGHT: 183 LBS | OXYGEN SATURATION: 100 % | DIASTOLIC BLOOD PRESSURE: 77 MMHG | RESPIRATION RATE: 16 BRPM

## 2018-02-28 DIAGNOSIS — J06.9 VIRAL URI WITH COUGH: Primary | ICD-10-CM

## 2018-02-28 LAB — S PYO AG THROAT QL: NEGATIVE

## 2018-02-28 PROCEDURE — 99213 OFFICE O/P EST LOW 20 MIN: CPT

## 2018-02-28 PROCEDURE — 99214 OFFICE O/P EST MOD 30 MIN: CPT

## 2018-02-28 PROCEDURE — 87430 STREP A AG IA: CPT

## 2018-02-28 RX ORDER — BENZONATATE 100 MG/1
100 CAPSULE ORAL 3 TIMES DAILY PRN
Qty: 30 CAPSULE | Refills: 0 | Status: SHIPPED | OUTPATIENT
Start: 2018-02-28 | End: 2018-04-25 | Stop reason: ALTCHOICE

## 2018-03-01 NOTE — ED PROVIDER NOTES
Patient Seen in: [facility] In [address] [place] [address]    History   Patient presents with:  Cough/URI    Stated Complaint: cough    HPI    The patient is a 70-year-old female with no significant past medical history presents now with cough, sore t upper and lower extremities bilaterally  Extremities: No focal swelling or tenderness  Skin: No pallor, no redness or warmth to the touch      ED Course     Labs Reviewed   [facility] POCT RAPID STREP - Normal       ED Course as of Feb 28 1816  ------------------

## 2018-04-25 ENCOUNTER — OFFICE VISIT (OUTPATIENT)
Dept: FAMILY MEDICINE CLINIC | Facility: CLINIC | Age: 33
End: 2018-04-25

## 2018-04-25 VITALS
DIASTOLIC BLOOD PRESSURE: 73 MMHG | SYSTOLIC BLOOD PRESSURE: 114 MMHG | HEIGHT: 64 IN | WEIGHT: 186 LBS | HEART RATE: 73 BPM | TEMPERATURE: 97 F | BODY MASS INDEX: 31.76 KG/M2

## 2018-04-25 DIAGNOSIS — N63.20 BREAST MASS, LEFT: ICD-10-CM

## 2018-04-25 DIAGNOSIS — N64.4 BREAST PAIN, LEFT: Primary | ICD-10-CM

## 2018-04-25 PROCEDURE — 99214 OFFICE O/P EST MOD 30 MIN: CPT | Performed by: FAMILY MEDICINE

## 2018-04-25 PROCEDURE — 99212 OFFICE O/P EST SF 10 MIN: CPT | Performed by: FAMILY MEDICINE

## 2018-04-25 RX ORDER — MEFENAMIC ACID 250 MG/1
250 CAPSULE ORAL EVERY 8 HOURS PRN
Qty: 30 CAPSULE | Refills: 1 | Status: SHIPPED | OUTPATIENT
Start: 2018-04-25 | End: 2018-05-05

## 2018-04-25 NOTE — PROGRESS NOTES
4/25/2018  11:04 AM    Ainsley Ryan is a 28year old female. Chief complaint(s): Patient presents with:  Breast Pain: bilateral breast pain, states that pain comes and goes.  She states that she has fibroma and cyst    HPI:     Shelbi worley Allergies:    Amoxicillin             Rash  Ibuprofen               Shortness of Breath      ROS:   Review of Systems   Constitutional: Negative for chills, fatigue and fever. HENT: Negative for ear pain and sore throat.     Respiratory: Negative fo BREAST LEFT COMPLETE ([id number]). RECOMMENDATIONS given include: Please, call our office with any questions or concerns. Notify Dr Bibi Weiner or the [facility] if there is a deterioration or worsening of the medical condition.  Also, inform the doc

## 2018-05-02 ENCOUNTER — HOSPITAL ENCOUNTER (OUTPATIENT)
Dept: ULTRASOUND IMAGING | Facility: HOSPITAL | Age: 33
Discharge: HOME | End: 2018-05-02
Attending: FAMILY MEDICINE
Payer: COMMERCIAL

## 2018-05-02 DIAGNOSIS — N64.4 BREAST PAIN, LEFT: ICD-10-CM

## 2018-05-02 DIAGNOSIS — N63.20 BREAST MASS, LEFT: ICD-10-CM

## 2018-05-02 PROCEDURE — 76642 ULTRASOUND BREAST LIMITED: CPT | Performed by: FAMILY MEDICINE

## 2018-07-25 ENCOUNTER — NURSE TRIAGE (OUTPATIENT)
Dept: OTHER | Age: 33
End: 2018-07-25

## 2018-07-25 ENCOUNTER — OFFICE VISIT (OUTPATIENT)
Dept: FAMILY MEDICINE CLINIC | Facility: CLINIC | Age: 33
End: 2018-07-25

## 2018-07-25 VITALS
SYSTOLIC BLOOD PRESSURE: 120 MMHG | WEIGHT: 185 LBS | HEART RATE: 78 BPM | BODY MASS INDEX: 31.58 KG/M2 | DIASTOLIC BLOOD PRESSURE: 78 MMHG | HEIGHT: 64 IN

## 2018-07-25 DIAGNOSIS — R30.0 DYSURIA: Primary | ICD-10-CM

## 2018-07-25 LAB
APPEARANCE: CLEAR
MULTISTIX LOT#: NORMAL NUMERIC
PH, URINE: 7 (ref 4.5–8)
SPECIFIC GRAVITY: 1.01 (ref 1–1.03)
URINE-COLOR: YELLOW
UROBILINOGEN,SEMI-QN: 0.2 MG/DL (ref 0–1.9)

## 2018-07-25 PROCEDURE — 81003 URINALYSIS AUTO W/O SCOPE: CPT | Performed by: NURSE PRACTITIONER

## 2018-07-25 PROCEDURE — 99213 OFFICE O/P EST LOW 20 MIN: CPT | Performed by: NURSE PRACTITIONER

## 2018-07-25 RX ORDER — NITROFURANTOIN 25; 75 MG/1; MG/1
100 CAPSULE ORAL 2 TIMES DAILY
Qty: 14 CAPSULE | Refills: 0 | Status: SHIPPED | OUTPATIENT
Start: 2018-07-25 | End: 2018-10-19

## 2018-07-25 NOTE — PROGRESS NOTES
HPI  Pt presents with burning with urination since this am.  Denies fever. Denies flank or back pain. Review of Systems   Constitutional: Negative for activity change and fever. Genitourinary: Positive for dysuria.  Negative for bladder incontinence, Musculoskeletal: She exhibits no tenderness. Skin: Skin is dry. Psychiatric: She has a normal mood and affect.  Her behavior is normal. Judgment and thought content normal.     urine dip -sm leuks and  sm blood  Assessment and Plan:  Problem List Item

## 2018-07-25 NOTE — PATIENT INSTRUCTIONS
URINARY TRACT INFECTION    -encourage fluids 8-12 glasses of non-caffeinated fluids/day  -encourage drinking cranberry juice  -urinate after intercourse  -keep good hygiene, avoid harsh soaps and lotions to perineal area  -females-wipe front to back  -kenrick

## 2018-07-25 NOTE — TELEPHONE ENCOUNTER
Action Requested: Summary for Provider     []  Critical Lab, Recommendations Needed  [] Need Additional Advice  [x]   FYI    []   Need Orders  [] Need Medications Sent to Pharmacy  []  Other     SUMMARY: Pt c/o one day dysuria with frequency, urgency and h

## 2018-10-19 ENCOUNTER — NURSE TRIAGE (OUTPATIENT)
Dept: OTHER | Age: 33
End: 2018-10-19

## 2018-10-19 ENCOUNTER — OFFICE VISIT (OUTPATIENT)
Dept: FAMILY MEDICINE CLINIC | Facility: CLINIC | Age: 33
End: 2018-10-19

## 2018-10-19 VITALS
WEIGHT: 185 LBS | BODY MASS INDEX: 31.58 KG/M2 | HEIGHT: 64 IN | SYSTOLIC BLOOD PRESSURE: 120 MMHG | DIASTOLIC BLOOD PRESSURE: 84 MMHG | HEART RATE: 71 BPM | TEMPERATURE: 98 F

## 2018-10-19 DIAGNOSIS — N30.00 ACUTE CYSTITIS WITHOUT HEMATURIA: Primary | ICD-10-CM

## 2018-10-19 PROCEDURE — 99213 OFFICE O/P EST LOW 20 MIN: CPT | Performed by: FAMILY MEDICINE

## 2018-10-19 PROCEDURE — 99212 OFFICE O/P EST SF 10 MIN: CPT | Performed by: FAMILY MEDICINE

## 2018-10-19 RX ORDER — PHENAZOPYRIDINE HYDROCHLORIDE 200 MG/1
200 TABLET, FILM COATED ORAL 3 TIMES DAILY PRN
Qty: 10 TABLET | Refills: 0 | Status: SHIPPED | OUTPATIENT
Start: 2018-10-19 | End: 2018-11-26

## 2018-10-19 RX ORDER — CIPROFLOXACIN 500 MG/1
500 TABLET, FILM COATED ORAL 2 TIMES DAILY
Qty: 20 TABLET | Refills: 0 | Status: SHIPPED | OUTPATIENT
Start: 2018-10-19 | End: 2018-10-29

## 2018-10-19 NOTE — PROGRESS NOTES
10/19/2018  11:54 AM    Fabrice Gerber is a 35year old female. Chief complaint(s): Patient presents with:  UTI    HPI:     Fabrice Gerber primary complaint is regarding dysuria. Patient to be evaluated for possible urinary tract infection. BREATH      ROS:   Review of Systems   Constitutional: Negative for chills, fatigue and fever. HENT: Negative for ear pain and sore throat. Respiratory: Negative for cough and wheezing. Cardiovascular: Negative for chest pain and palpitations.    [place] UROBILINOGEN,SEMI-QN 0.2 0.0 - 1.9 mg/dL    NITRITE, URINE neg Negative    LEUKOCYTES trace Negative    APPEARANCE clear Clear    URINE-COLOR yellow Yellow    [employer] Lot# [id number] Numeric    Mult[employer] Expiration Date 4/30/19 Date   URINE CULTURE, ROUTINE 200 MG Oral Tab 10 tablet 0     Sig: Take 1 tablet (200 mg total) by mouth 3 (three) times daily as needed for Pain.        Imaging & Referrals:  None         Corey Gerard MD

## 2018-11-26 ENCOUNTER — OFFICE VISIT (OUTPATIENT)
Dept: FAMILY MEDICINE CLINIC | Facility: CLINIC | Age: 33
End: 2018-11-26

## 2018-11-26 VITALS
SYSTOLIC BLOOD PRESSURE: 124 MMHG | BODY MASS INDEX: 32 KG/M2 | HEART RATE: 66 BPM | DIASTOLIC BLOOD PRESSURE: 81 MMHG | WEIGHT: 188 LBS

## 2018-11-26 DIAGNOSIS — B07.8 COMMON WART: Primary | ICD-10-CM

## 2018-11-26 PROCEDURE — 99212 OFFICE O/P EST SF 10 MIN: CPT | Performed by: FAMILY MEDICINE

## 2018-11-26 PROCEDURE — 99213 OFFICE O/P EST LOW 20 MIN: CPT | Performed by: FAMILY MEDICINE

## 2018-11-26 RX ORDER — OMEPRAZOLE 20 MG/1
20 CAPSULE, DELAYED RELEASE ORAL
COMMUNITY
End: 2019-03-12

## 2018-11-26 NOTE — PROGRESS NOTES
11/26/2018  1:22 PM    Keerthi Orellana is a 35year old female. Chief complaint(s): Patient presents with:  Derm Problem: warts on hand    HPI:     Keerthi Reyna primary complaint is regarding as above.      Keerthi Orellana is a 35year old fe palpitations. Gastrointestinal: Negative for nausea, vomiting, abdominal pain, diarrhea and constipation. Musculoskeletal: Negative for back pain. Skin: Positive for rash. Neurological: Negative for seizures and headaches.    Psychiatric/Behavioral: instructed on wound care by cleaning area(s) once or twice a day, applying antibiotic ointment as well as post-op medications for pain control. Follow up appointment was given in 3 weeks.       Teresa Dickson MD      LABORATORY RESULTS:      EKG / [place]

## 2018-12-08 NOTE — LETTER
AUTHORIZATION FOR SURGICAL OPERATION OR OTHER PROCEDURE    1. I hereby authorize Dr. Shania Chowdhury, and [place] [employer] [place] [facility] staff assigned to my case to perform the following operation and/or procedure at the [facility], [facility]:    _______________________________________________________________________________________________    ENDOSEE WITH IUD REMOVAL/ PARAGUARD IUD INSERTION  _______________________________________________________________________________________________    2. My physician has explained the nature and purpose of the operation or other procedure, possible alternative methods of treatment, the risks involved, and the possibility of complication to me. I acknowledge that no guarantee has been made as to the result that may be obtained. 3.  I recognize that, during the course of this operation, or other procedure, unforseen conditions may necessitate additional or different procedure than those listed above. I, therefore, further authorize and request that the above named physician, his/her physician assistants or designees perform such procedures as are, in his/her professional opinion, necessary and desirable. 4.  Any tissue or organs removed in the operation or other procedure may be disposed of by and at the discretion of the [facility], [facility] and [facility]. 5.  I understand that in the event of a medical emergency, I will be transported by local paramedics to [facility] or other hospital emergency department. 6.  I certify that I have read and fully understand the above consent to operation and/or other procedure. 7.  I acknowledge that my physician has explained sedation/analgesia administration to me including the risks and benefits. I consent to the administration of sedation/analgesia as may be necessary or desirable in the judgement of my physician.     Witness signature: ___________________________________________________ Date: Telephone Encounter by Karena Foss RN at 06/14/17 04:42 PM     Author:  Karena Foss RN Service:  (none) Author Type:  Registered Nurse     Filed:  06/19/17 05:41 PM Encounter Date:  5/22/2017 Status:  Addendum     :  Karena Foss RN (Registered Nurse)            Left message on voice mail that we received the information from [place] for the referral and will get it entered.  The surgery scheduler will call us when a date is schedule[DM1.1M]d[DM1.2M].[DM1.1M]        Revision History        User Key Date/Time User Provider Type Action    > DM1.2 06/19/17 05:41 PM Karena Foss RN Registered Nurse Addend     DM1.1 06/14/17 04:43 PM Karena Foss RN Registered Nurse Sign    M - Manual             ______/______/_____                    Time:  ________ A. M.  P.M. Patient Name:  ______________________________________________________  (please print)      Patient signature:  ___________________________________________________             Relationship to Patient:           []  Parent    Responsible person                          []  Spouse  In case of minor or                    [] Other  _____________   Incompetent name:  __________________________________________________                               (please print)      _____________      Responsible person  In case of minor or  Incompetent signature:  _______________________________________________    Statement of Physician  My signature below affirms that prior to the time of the procedure, I have explained to the patient and/or his/her guardian, the risks and benefits involved in the proposed treatment and any reasonable alternative to the proposed treatment. I have also explained the risks and benefits involved in the refusal of the proposed treatment and have answered the patient's questions.                         Date:  ______/______/_______  Provider                      Signature:  __________________________________________________________       Time:  ___________ A.M    P.M.

## 2019-01-04 ENCOUNTER — HOSPITAL ENCOUNTER (OUTPATIENT)
Dept: GENERAL RADIOLOGY | Age: 34
Discharge: HOME | End: 2019-01-04
Attending: NURSE PRACTITIONER
Payer: COMMERCIAL

## 2019-01-04 ENCOUNTER — OFFICE VISIT (OUTPATIENT)
Dept: FAMILY MEDICINE CLINIC | Facility: CLINIC | Age: 34
End: 2019-01-04

## 2019-01-04 VITALS — HEIGHT: 64 IN | BODY MASS INDEX: 32.78 KG/M2 | WEIGHT: 192 LBS

## 2019-01-04 DIAGNOSIS — S20.212A CHEST WALL CONTUSION, LEFT, INITIAL ENCOUNTER: ICD-10-CM

## 2019-01-04 DIAGNOSIS — S20.212A CHEST WALL CONTUSION, LEFT, INITIAL ENCOUNTER: Primary | ICD-10-CM

## 2019-01-04 PROCEDURE — 99213 OFFICE O/P EST LOW 20 MIN: CPT | Performed by: NURSE PRACTITIONER

## 2019-01-04 PROCEDURE — 71101 X-RAY EXAM UNILAT RIBS/CHEST: CPT | Performed by: NURSE PRACTITIONER

## 2019-01-04 RX ORDER — CYCLOBENZAPRINE HCL 5 MG
5 TABLET ORAL 3 TIMES DAILY PRN
Qty: 30 TABLET | Refills: 0 | Status: SHIPPED | OUTPATIENT
Start: 2019-01-04 | End: 2019-03-12

## 2019-01-04 NOTE — PATIENT INSTRUCTIONS
Contusión en el pecho    Carolina Fujita contusión (contusion) es un hematoma, o magulladura (bruise), en la piel, los músculos o las costillas. Puede causar dolor, sensibilidad, hinchazón y verse amoratado (de color ryan).  Las contusiones tardan [employer] unos pocos · Ashly Parra  · Falta de aire, dificultad para respirar o respiración acelerada  · Aumenta el dolor en el pecho al respirar  · Dolor paige que aparece de repente o que dura más de [place]  Date Last Reviewed: 10/4/2017  © 5691-6153 [facility] · [employer] medicamentos que le hayan recetado.  Si no le recetaron ninguno, tome paracetamol (acetaminofén), ibuprofeno o naproxeno para aliviar el dolor.   Visitas de control  Julieta un seguimiento con almonte proveedor de [facility] nickie la [employer]

## 2019-01-04 NOTE — ASSESSMENT & PLAN NOTE
Left rib xrays  Extra strength tylenol 1 po qid prn  Flexeril 5 mg I po q 8 hrs prn-will cause drowsiness  Ice to affected areas 20 min 4-6 times per day    Please call if symptoms worsen or are not resolving.

## 2019-01-04 NOTE — PROGRESS NOTES
HPI  Pt presents for f/u from fall on stairs one week ago-was going up stairs and fell onto her left side. Has pain in left post neck, upper back and left side and sternal and left chest wall pain.      Review of Systems   Constitutional: Positive for activ Sleep Concern: Not Asked        Stress Concern: Not Asked        Weight Concern: Not Asked        Special Diet: Not Asked        Back Care: Not Asked        Exercise: Not Asked        Bike Helmet: Not Asked        Seat Belt: Not Asked        Self-Exa Discussed plan of care with pt and pt is in agreement. All questions answered. Pt to call with questions or concerns. Encouraged to sign up for My Chart if not already registered.

## 2019-01-05 ENCOUNTER — HOSPITAL ENCOUNTER (EMERGENCY)
Facility: HOSPITAL | Age: 34
Discharge: HOME | End: 2019-01-05
Attending: EMERGENCY MEDICINE
Payer: COMMERCIAL

## 2019-01-05 ENCOUNTER — APPOINTMENT (OUTPATIENT)
Dept: CT IMAGING | Facility: HOSPITAL | Age: 34
End: 2019-01-05
Attending: EMERGENCY MEDICINE
Payer: COMMERCIAL

## 2019-01-05 VITALS
HEART RATE: 68 BPM | RESPIRATION RATE: 18 BRPM | SYSTOLIC BLOOD PRESSURE: 121 MMHG | BODY MASS INDEX: 32.78 KG/M2 | WEIGHT: 192 LBS | DIASTOLIC BLOOD PRESSURE: 82 MMHG | HEIGHT: 64 IN | TEMPERATURE: 98 F | OXYGEN SATURATION: 100 %

## 2019-01-05 DIAGNOSIS — S20.211A CONTUSION OF RIB ON RIGHT SIDE, INITIAL ENCOUNTER: ICD-10-CM

## 2019-01-05 DIAGNOSIS — S30.1XXA CONTUSION OF ABDOMINAL WALL, INITIAL ENCOUNTER: Primary | ICD-10-CM

## 2019-01-05 LAB — B-HCG UR QL: NEGATIVE

## 2019-01-05 PROCEDURE — 74176 CT ABD & PELVIS W/O CONTRAST: CPT | Performed by: EMERGENCY MEDICINE

## 2019-01-05 PROCEDURE — 99284 EMERGENCY DEPT VISIT MOD MDM: CPT

## 2019-01-05 PROCEDURE — 81025 URINE PREGNANCY TEST: CPT

## 2019-01-05 NOTE — ED PROVIDER NOTES
Patient Seen in: [facility] AND [facility] Emergency Department    History   Patient presents with:  Abdominal Pain    Stated Complaint: abd pain    HPI    Patient complains of luq abdominal pain that began 1.5 weeks ago fell hit abd/ribs.   Pain described as ach moist  Respiratory: there are no retractions, lungs are clear to auscultation, tender l ant lower ribs  Cardiovascular: regular rate and rhythm    Gastrointestinal: soft, tender along lower costal margin and luq, no guarding no peritoneal signs,  Neurologi Clinical Impression:  Contusion of abdominal wall, initial encounter  (primary encounter diagnosis)  Contusion of rib on right side, initial encounter    Disposition:  Discharge    Follow-up:  Dulce Arevalo DO  [address] [facility]

## 2019-01-05 NOTE — ED INITIAL ASSESSMENT (HPI)
Left upper quadrant abd pain x1.5 weeks, pain worse today. Saw pmd yesterday for this and had xr done and was negative. States she fell 2 weeks ago and then started having this pain.

## 2019-02-28 ENCOUNTER — NURSE TRIAGE (OUTPATIENT)
Dept: OTHER | Age: 34
End: 2019-02-28

## 2019-02-28 RX ORDER — ONDANSETRON 8 MG/1
8 TABLET, ORALLY DISINTEGRATING ORAL EVERY 8 HOURS PRN
Qty: 15 TABLET | Refills: 0 | Status: SHIPPED | OUTPATIENT
Start: 2019-02-28 | End: 2019-03-12

## 2019-02-28 NOTE — TELEPHONE ENCOUNTER
Action Requested: Summary for Provider     []  Critical Lab, Recommendations Needed  [] Need Additional Advice  []   FYI    []   Need Orders  [] Need Medications Sent to Pharmacy  []  Other     SUMMARY: pt states that she began with nausea, vomiting and di

## 2019-03-12 ENCOUNTER — OFFICE VISIT (OUTPATIENT)
Dept: FAMILY MEDICINE CLINIC | Facility: CLINIC | Age: 34
End: 2019-03-12

## 2019-03-12 VITALS
HEART RATE: 75 BPM | DIASTOLIC BLOOD PRESSURE: 78 MMHG | SYSTOLIC BLOOD PRESSURE: 123 MMHG | WEIGHT: 192 LBS | BODY MASS INDEX: 32.78 KG/M2 | TEMPERATURE: 99 F | HEIGHT: 64 IN

## 2019-03-12 DIAGNOSIS — J11.1 INFLUENZA: Primary | ICD-10-CM

## 2019-03-12 PROCEDURE — 99213 OFFICE O/P EST LOW 20 MIN: CPT | Performed by: NURSE PRACTITIONER

## 2019-03-12 RX ORDER — OSELTAMIVIR PHOSPHATE 75 MG/1
75 CAPSULE ORAL 2 TIMES DAILY
Qty: 10 CAPSULE | Refills: 0 | Status: SHIPPED | OUTPATIENT
Start: 2019-03-12 | End: 2019-03-17

## 2019-03-12 NOTE — PROGRESS NOTES
HPI  Pt here for fever (max 103 yesterday); chills, fatigue headache, coughing-s/s started yesterday. Review of Systems   Constitutional: Positive for activity change, chills and fatigue.  Negative for appetite change, fever and unexpected weight change Financial resource strain: Not on file      Food insecurity:        Worry: Not on file        Inability: Not on file      Transportation needs:        Medical: Not on file        Non-medical: Not on file    Tobacco Use      Smoking status: Never Smoker person, place, and time. She appears well-developed and well-nourished. She appears ill. No distress. HENT:   Head: Normocephalic and atraumatic.    Right Ear: Tympanic membrane and ear canal normal. No cerumen present  Left Ear: Tympanic membrane and ear

## 2019-03-12 NOTE — PATIENT INSTRUCTIONS
La gripe  [employer] (en inglés, “the flu”) es roland infección que afecta las vías respiratorias (la boca, la nariz, los pulmones y los conductos que los comunican). A diferencia de un resfriado, la gripe puede ser muy grave para la sandor y llegar a producir · Gardenia musculares     Factores que pueden empeorar la gripe  En [employer], la gripe puede ser muy grave. El riesgo de complicaciones es mayor para:  · Niños menores de 5 años de [place]. · Adultos mayores de 72 años de edad.   · Personas que tienen · Permanezca en almonte casa hasta por lo menos 24 horas después de que haya desparecido la fiebre o los escalofríos. Asegúrese de que la fiebre no esté escondida por medicametnos que reduzcan la [place].   · No comparta comida, utensilios, vasos o cepillos de di Roland de las mejores maneras de prevenir muchas infecciones frecuentes es lavarse las clau. Si usted está cuidando o visitando a roland persona con gripe, [place] las clau cada vez que entre y salga de la habitación.  Siga estos pasos:  · Use agua tibia y [address] Date Last Reviewed: 8/27/2014  © 5536-6515 [employer] [address]. [address] [facility], [facility]. Todos los derechos reservados.  Esta información no pretende sustituir la atención médica profesional. Sólo almonte médico puede diagnosticar y trata

## 2019-03-12 NOTE — ASSESSMENT & PLAN NOTE
Start tamiflu 75 mg I po bid x 5 days  Supportive care discussed to help alleviate symptoms  Please call if symptoms worsen or are not resolving.

## 2019-04-17 ENCOUNTER — OFFICE VISIT (OUTPATIENT)
Dept: FAMILY MEDICINE CLINIC | Facility: CLINIC | Age: 34
End: 2019-04-17

## 2019-04-17 VITALS
HEIGHT: 64 IN | RESPIRATION RATE: 20 BRPM | BODY MASS INDEX: 32.61 KG/M2 | SYSTOLIC BLOOD PRESSURE: 111 MMHG | WEIGHT: 191 LBS | DIASTOLIC BLOOD PRESSURE: 75 MMHG | HEART RATE: 61 BPM | TEMPERATURE: 98 F

## 2019-04-17 DIAGNOSIS — E66.9 OBESITY (BMI 30-39.9): Primary | ICD-10-CM

## 2019-04-17 PROCEDURE — 99212 OFFICE O/P EST SF 10 MIN: CPT | Performed by: FAMILY MEDICINE

## 2019-04-17 PROCEDURE — 99213 OFFICE O/P EST LOW 20 MIN: CPT | Performed by: FAMILY MEDICINE

## 2019-04-17 NOTE — PROGRESS NOTES
Blood pressure 111/75, pulse 61, temperature 97.6 °F (36.4 °C), temperature source Oral, resp. rate 20, height 5' 4\" (1.626 m), weight 191 lb (86.6 kg), not currently breastfeeding.     Patient presents today complaining of swelling of her legs bilaterally

## 2019-05-08 ENCOUNTER — OFFICE VISIT (OUTPATIENT)
Dept: FAMILY MEDICINE CLINIC | Facility: CLINIC | Age: 34
End: 2019-05-08

## 2019-05-08 VITALS
SYSTOLIC BLOOD PRESSURE: 120 MMHG | DIASTOLIC BLOOD PRESSURE: 78 MMHG | TEMPERATURE: 97 F | HEART RATE: 69 BPM | WEIGHT: 191 LBS | BODY MASS INDEX: 32.61 KG/M2 | RESPIRATION RATE: 20 BRPM | HEIGHT: 64 IN

## 2019-05-08 DIAGNOSIS — S60.212A CONTUSION OF LEFT WRIST, INITIAL ENCOUNTER: Primary | ICD-10-CM

## 2019-05-08 PROCEDURE — 99213 OFFICE O/P EST LOW 20 MIN: CPT | Performed by: NURSE PRACTITIONER

## 2019-05-08 NOTE — PATIENT INSTRUCTIONS
Contusiones (moretones)    Marcelle contusión es un moretón. Un moretón se forma cuando norah recibe un golpe que no rompe la piel juanita sí los vasos sanguíneos que están debajo. La ricardo que se derrama de los vasos rotos provoca enrojecimiento e hinchazón.  A nirav A veces un moretón empeora en vez de mejorar, y puede aumentar de tamaño e hincharse más. [place] puede suceder cuando el cuerpo retiene roland pequeña acumulación de ricardo debajo de la piel (hematoma).  En ciertos casos muy raros, podría ser necesario que almonte mé

## 2019-05-08 NOTE — PROGRESS NOTES
HPI    Patient presents with swelling of the right wrist.  States that she was at the gym yesterday working out with an elastic band and that it slapped on her wrist.  Feels like her finger are numb.   No loss of ROM in wrist.      Review of Systems   Const connections:        Talks on phone: Not on file        Gets together: Not on file        Attends Uatsdin service: Not on file        Active member of club or organization: Not on file        Attends meetings of clubs or organizations: Not on file Psychiatric: She has a normal mood and affect.  Her behavior is normal. Judgment and thought content normal.       Assessment and Plan:  Problem List Items Addressed This Visit        Musculoskeletal    Contusion of left wrist - Primary     Extra strength

## 2019-06-26 ENCOUNTER — HOSPITAL ENCOUNTER (EMERGENCY)
Facility: HOSPITAL | Age: 34
Discharge: HOME | End: 2019-06-26
Attending: EMERGENCY MEDICINE
Payer: COMMERCIAL

## 2019-06-26 ENCOUNTER — APPOINTMENT (OUTPATIENT)
Dept: CT IMAGING | Facility: HOSPITAL | Age: 34
End: 2019-06-26
Attending: EMERGENCY MEDICINE
Payer: COMMERCIAL

## 2019-06-26 ENCOUNTER — APPOINTMENT (OUTPATIENT)
Dept: MRI IMAGING | Facility: HOSPITAL | Age: 34
End: 2019-06-26
Attending: EMERGENCY MEDICINE
Payer: COMMERCIAL

## 2019-06-26 VITALS
SYSTOLIC BLOOD PRESSURE: 104 MMHG | RESPIRATION RATE: 12 BRPM | DIASTOLIC BLOOD PRESSURE: 66 MMHG | OXYGEN SATURATION: 98 % | HEIGHT: 64 IN | BODY MASS INDEX: 31.58 KG/M2 | HEART RATE: 65 BPM | WEIGHT: 185 LBS | TEMPERATURE: 98 F

## 2019-06-26 DIAGNOSIS — R20.2 PARESTHESIAS: Primary | ICD-10-CM

## 2019-06-26 PROCEDURE — 80048 BASIC METABOLIC PNL TOTAL CA: CPT | Performed by: EMERGENCY MEDICINE

## 2019-06-26 PROCEDURE — 70450 CT HEAD/BRAIN W/O DYE: CPT | Performed by: EMERGENCY MEDICINE

## 2019-06-26 PROCEDURE — 36415 COLL VENOUS BLD VENIPUNCTURE: CPT

## 2019-06-26 PROCEDURE — 85025 COMPLETE CBC W/AUTO DIFF WBC: CPT | Performed by: EMERGENCY MEDICINE

## 2019-06-26 PROCEDURE — 93005 ELECTROCARDIOGRAM TRACING: CPT

## 2019-06-26 PROCEDURE — 93010 ELECTROCARDIOGRAM REPORT: CPT | Performed by: EMERGENCY MEDICINE

## 2019-06-26 PROCEDURE — 99284 EMERGENCY DEPT VISIT MOD MDM: CPT

## 2019-06-27 NOTE — ED PROVIDER NOTES
Patient Seen in: [facility] AND [facility] Emergency Department    History   Patient presents with:  Numbness Weakness (neurologic)    Stated Complaint: pt was working out today at 4p.  after workout pt had numbness and pain to Left *    HPI    27-year-old female Temp 98.1 °F (36.7 °C)   Temp src Oral   SpO2 100 %   O2 Device None (Room air)       Current:/66   Pulse 65   Temp 98.1 °F (36.7 °C) (Oral)   Resp 12   Ht 162.6 cm (5' 4\")   Wt 83.9 kg   LMP 06/25/2019   SpO2 98%   BMI 31.76 kg/m²         Physical RAINBOW DRAW LIGHT GREEN   RAINBOW DRAW GOLD   CBC W/ DIFFERENTIAL     EKG    Rate, intervals and axes as noted on EKG Report. Rate: 77  Rhythm: Sinus Rhythm  Reading: no stemi, interpreted by ed physician. MDM    CBC BMP unremarkable.   P

## 2019-06-27 NOTE — ED INITIAL ASSESSMENT (HPI)
C/o tingling to left side of face and left arm since 1600 while working out. CSS negative in triage. +dizziness.

## 2019-07-03 ENCOUNTER — OFFICE VISIT (OUTPATIENT)
Dept: FAMILY MEDICINE CLINIC | Facility: CLINIC | Age: 34
End: 2019-07-03

## 2019-07-03 ENCOUNTER — HOSPITAL ENCOUNTER (OUTPATIENT)
Dept: GENERAL RADIOLOGY | Age: 34
Discharge: HOME | End: 2019-07-03
Attending: FAMILY MEDICINE
Payer: COMMERCIAL

## 2019-07-03 VITALS
HEART RATE: 59 BPM | BODY MASS INDEX: 31.76 KG/M2 | DIASTOLIC BLOOD PRESSURE: 72 MMHG | HEIGHT: 64 IN | SYSTOLIC BLOOD PRESSURE: 121 MMHG | TEMPERATURE: 98 F | WEIGHT: 186 LBS

## 2019-07-03 DIAGNOSIS — M54.50 ACUTE MIDLINE LOW BACK PAIN WITHOUT SCIATICA: ICD-10-CM

## 2019-07-03 DIAGNOSIS — M54.50 ACUTE MIDLINE LOW BACK PAIN WITHOUT SCIATICA: Primary | ICD-10-CM

## 2019-07-03 PROCEDURE — 72110 X-RAY EXAM L-2 SPINE 4/>VWS: CPT | Performed by: FAMILY MEDICINE

## 2019-07-03 PROCEDURE — 99213 OFFICE O/P EST LOW 20 MIN: CPT | Performed by: FAMILY MEDICINE

## 2019-07-03 RX ORDER — METAXALONE 800 MG/1
800 TABLET ORAL 3 TIMES DAILY
Qty: 30 TABLET | Refills: 1 | Status: SHIPPED | OUTPATIENT
Start: 2019-07-03 | End: 2019-07-23

## 2019-07-03 NOTE — PROGRESS NOTES
7/3/2019  1:00 PM    Reba Sampson is a 29year old female.     Chief complaint(s): Patient presents with:  Back Pain: low back pain that radiates to upper back and upper extremities, x 1 week   Numbness: left arm numbness that radiates to face     HPI Allergies:    Amoxicillin             RASH  Ibuprofen               SHORTNESS OF BREATH      ROS:   Review of Systems   Constitutional: Negative for chills, fatigue and fever. Respiratory: Negative for shortness of breath.     Cardiovascular: Negati The gray-white matter junction is preserved and bilaterally symmetric in appearance. CEREBELLUM: No edema, hemorrhage, mass, acute infarction, or significant atrophy. BRAINSTEM: No edema, hemorrhage, mass, acute infarction, or significant atrophy.   Aidan Pyle Disp Refills   • Metaxalone (SKELAXIN) 800 MG Oral Tab 30 tablet 1     Sig: Take 1 tablet (800 mg total) by mouth 3 (three) times daily for 20 days.        Imaging & Referrals:  None         Shilpi Loera MD

## 2019-07-08 ENCOUNTER — TELEPHONE (OUTPATIENT)
Dept: FAMILY MEDICINE CLINIC | Facility: CLINIC | Age: 34
End: 2019-07-08

## 2019-07-11 ENCOUNTER — TELEPHONE (OUTPATIENT)
Dept: FAMILY MEDICINE CLINIC | Facility: CLINIC | Age: 34
End: 2019-07-11

## 2019-07-11 NOTE — TELEPHONE ENCOUNTER
RN confirmed with staff at [facility] that ok to schedule appointment for patient with Dr. Zhao Velez, as long as patient was seen before. Appointment scheduled.     Future Appointments   Date Time Provider Gil Walls   7/18/2019  9:15 AM Jagdish

## 2019-07-11 NOTE — TELEPHONE ENCOUNTER
Talked with patient via Language line,  Aron, [address]. Patient calling regarding continued back pain. Patient is taking the skelaxin with only minimal relief. Patient rates her pain a 7/10, last dose of the pain med was last night.   Praxair

## 2019-07-18 ENCOUNTER — OFFICE VISIT (OUTPATIENT)
Dept: FAMILY MEDICINE CLINIC | Facility: CLINIC | Age: 34
End: 2019-07-18

## 2019-07-18 VITALS
WEIGHT: 187 LBS | BODY MASS INDEX: 31.92 KG/M2 | HEIGHT: 64 IN | SYSTOLIC BLOOD PRESSURE: 124 MMHG | TEMPERATURE: 98 F | HEART RATE: 91 BPM | DIASTOLIC BLOOD PRESSURE: 80 MMHG

## 2019-07-18 DIAGNOSIS — G89.29 OTHER CHRONIC BACK PAIN: Primary | ICD-10-CM

## 2019-07-18 DIAGNOSIS — M54.9 OTHER CHRONIC BACK PAIN: Primary | ICD-10-CM

## 2019-07-18 PROCEDURE — 99213 OFFICE O/P EST LOW 20 MIN: CPT | Performed by: FAMILY MEDICINE

## 2019-07-18 PROCEDURE — 97810 ACUP 1/> WO ESTIM 1ST 15 MIN: CPT | Performed by: FAMILY MEDICINE

## 2019-07-18 NOTE — PROGRESS NOTES
HPI:    Patient ID: Michelle White is a 29year old female. Fatigue. .        Review of Systems   Constitutional: Positive for fatigue. Negative for activity change, appetite change, chills, diaphoresis, fever and unexpected weight change.    HENT: Ne

## 2019-07-18 NOTE — PROGRESS NOTES
HPI:    Patient ID: Mya Guan is a 29year old female. Patient with a back pain for a long time. Not feeling any better, its mostly in the thoracic spine area.  Started working out in gym and it is a bit better after that      Review of Systems this encounter       Imaging & Referrals:  None       NE#[id number]

## 2019-07-20 ENCOUNTER — LAB ENCOUNTER (OUTPATIENT)
Dept: LAB | Age: 34
End: 2019-07-20
Attending: FAMILY MEDICINE
Payer: COMMERCIAL

## 2019-07-20 ENCOUNTER — OFFICE VISIT (OUTPATIENT)
Dept: FAMILY MEDICINE CLINIC | Facility: CLINIC | Age: 34
End: 2019-07-20

## 2019-07-20 ENCOUNTER — APPOINTMENT (OUTPATIENT)
Dept: LAB | Age: 34
End: 2019-07-20
Attending: FAMILY MEDICINE
Payer: COMMERCIAL

## 2019-07-20 VITALS
WEIGHT: 185 LBS | TEMPERATURE: 98 F | DIASTOLIC BLOOD PRESSURE: 72 MMHG | HEART RATE: 65 BPM | SYSTOLIC BLOOD PRESSURE: 107 MMHG | HEIGHT: 64 IN | BODY MASS INDEX: 31.58 KG/M2

## 2019-07-20 DIAGNOSIS — N63.20 LEFT BREAST MASS: ICD-10-CM

## 2019-07-20 DIAGNOSIS — Z00.00 PHYSICAL EXAM: ICD-10-CM

## 2019-07-20 DIAGNOSIS — Z00.00 PHYSICAL EXAM: Primary | ICD-10-CM

## 2019-07-20 LAB
ALBUMIN SERPL-MCNC: 3.9 G/DL (ref 3.4–5)
ALBUMIN/GLOB SERPL: 1.1 {RATIO} (ref 1–2)
ALP LIVER SERPL-CCNC: 72 U/L (ref 37–98)
ALT SERPL-CCNC: 21 U/L (ref 13–56)
ANION GAP SERPL CALC-SCNC: 6 MMOL/L (ref 0–18)
AST SERPL-CCNC: 12 U/L (ref 15–37)
BASOPHILS # BLD AUTO: 0.03 X10(3) UL (ref 0–0.2)
BASOPHILS NFR BLD AUTO: 0.5 %
BILIRUB SERPL-MCNC: 0.4 MG/DL (ref 0.1–2)
BILIRUB UR QL: NEGATIVE
BUN BLD-MCNC: 11 MG/DL (ref 7–18)
BUN/CREAT SERPL: 14.3 (ref 10–20)
CALCIUM BLD-MCNC: 9 MG/DL (ref 8.5–10.1)
CHLORIDE SERPL-SCNC: 108 MMOL/L (ref 98–112)
CHOLEST SMN-MCNC: 176 MG/DL (ref ?–200)
CLARITY UR: CLEAR
CO2 SERPL-SCNC: 27 MMOL/L (ref 21–32)
COLOR UR: YELLOW
CREAT BLD-MCNC: 0.77 MG/DL (ref 0.55–1.02)
DEPRECATED RDW RBC AUTO: 39.7 FL (ref 35.1–46.3)
EOSINOPHIL # BLD AUTO: 0.13 X10(3) UL (ref 0–0.7)
EOSINOPHIL NFR BLD AUTO: 2.1 %
ERYTHROCYTE [DISTWIDTH] IN BLOOD BY AUTOMATED COUNT: 12.1 % (ref 11–15)
EST. AVERAGE GLUCOSE BLD GHB EST-MCNC: 114 MG/DL (ref 68–126)
GLOBULIN PLAS-MCNC: 3.7 G/DL (ref 2.8–4.4)
GLUCOSE BLD-MCNC: 99 MG/DL (ref 70–99)
GLUCOSE UR-MCNC: NEGATIVE MG/DL
HBA1C MFR BLD HPLC: 5.6 % (ref ?–5.7)
HCT VFR BLD AUTO: 36.4 % (ref 35–48)
HDLC SERPL-MCNC: 35 MG/DL (ref 40–59)
HGB BLD-MCNC: 12.5 G/DL (ref 12–16)
IMM GRANULOCYTES # BLD AUTO: 0.01 X10(3) UL (ref 0–1)
IMM GRANULOCYTES NFR BLD: 0.2 %
KETONES UR-MCNC: NEGATIVE MG/DL
LDLC SERPL CALC-MCNC: 118 MG/DL (ref ?–100)
LYMPHOCYTES # BLD AUTO: 2.36 X10(3) UL (ref 1–4)
LYMPHOCYTES NFR BLD AUTO: 38.9 %
M PROTEIN MFR SERPL ELPH: 7.6 G/DL (ref 6.4–8.2)
MCH RBC QN AUTO: 30.9 PG (ref 26–34)
MCHC RBC AUTO-ENTMCNC: 34.3 G/DL (ref 31–37)
MCV RBC AUTO: 90.1 FL (ref 80–100)
MONOCYTES # BLD AUTO: 0.39 X10(3) UL (ref 0.1–1)
MONOCYTES NFR BLD AUTO: 6.4 %
NEUTROPHILS # BLD AUTO: 3.15 X10 (3) UL (ref 1.5–7.7)
NEUTROPHILS # BLD AUTO: 3.15 X10(3) UL (ref 1.5–7.7)
NEUTROPHILS NFR BLD AUTO: 51.9 %
NITRITE UR QL STRIP.AUTO: NEGATIVE
NONHDLC SERPL-MCNC: 141 MG/DL (ref ?–130)
OSMOLALITY SERPL CALC.SUM OF ELEC: 291 MOSM/KG (ref 275–295)
PATIENT FASTING: YES
PATIENT FASTING: YES
PH UR: 6 [PH] (ref 5–8)
PLATELET # BLD AUTO: 271 10(3)UL (ref 150–450)
POTASSIUM SERPL-SCNC: 3.9 MMOL/L (ref 3.5–5.1)
PROT UR-MCNC: 30 MG/DL
RBC # BLD AUTO: 4.04 X10(6)UL (ref 3.8–5.3)
RBC #/AREA URNS AUTO: 1 /HPF
RBC #/AREA URNS AUTO: 2 /HPF
SODIUM SERPL-SCNC: 141 MMOL/L (ref 136–145)
SP GR UR STRIP: 1.03 (ref 1–1.03)
TRIGL SERPL-MCNC: 115 MG/DL (ref 30–149)
TSI SER-ACNC: 1.04 MIU/ML (ref 0.36–3.74)
UROBILINOGEN UR STRIP-ACNC: <2
VIT C UR-MCNC: NEGATIVE MG/DL
VLDLC SERPL CALC-MCNC: 23 MG/DL (ref 0–30)
WBC # BLD AUTO: 6.1 X10(3) UL (ref 4–11)
WBC #/AREA URNS AUTO: 1 /HPF
WBC #/AREA URNS AUTO: 3 /HPF

## 2019-07-20 PROCEDURE — 80053 COMPREHEN METABOLIC PANEL: CPT

## 2019-07-20 PROCEDURE — 85025 COMPLETE CBC W/AUTO DIFF WBC: CPT

## 2019-07-20 PROCEDURE — 80061 LIPID PANEL: CPT

## 2019-07-20 PROCEDURE — 81015 MICROSCOPIC EXAM OF URINE: CPT | Performed by: FAMILY MEDICINE

## 2019-07-20 PROCEDURE — 93005 ELECTROCARDIOGRAM TRACING: CPT

## 2019-07-20 PROCEDURE — 36415 COLL VENOUS BLD VENIPUNCTURE: CPT

## 2019-07-20 PROCEDURE — 99395 PREV VISIT EST AGE 18-39: CPT | Performed by: FAMILY MEDICINE

## 2019-07-20 PROCEDURE — 81001 URINALYSIS AUTO W/SCOPE: CPT | Performed by: FAMILY MEDICINE

## 2019-07-20 PROCEDURE — 93010 ELECTROCARDIOGRAM REPORT: CPT | Performed by: FAMILY MEDICINE

## 2019-07-20 PROCEDURE — 84443 ASSAY THYROID STIM HORMONE: CPT

## 2019-07-20 PROCEDURE — 83036 HEMOGLOBIN GLYCOSYLATED A1C: CPT

## 2019-07-20 NOTE — PROGRESS NOTES
7/20/2019  8:14 AM    Calli Mercado is a 29year old female. Chief complaint(s): Patient presents with:  Physical: Pap smear done 2 years ago 8/11/17. Mammo abnormal ultrasound done in 2018.  Pt is requesting Mammo referral    HPI:     Pardeep Samuel Allergies:    Amoxicillin             RASH  Ibuprofen               SHORTNESS OF BREATH      ROS:   Review of Systems   Constitutional: Negative for appetite change, chills, diaphoresis, fatigue and fever.    HENT: Negative for ear pain, hearing loss, rub.    No murmur heard. Pulmonary/Chest: No respiratory distress. Right breast exhibits no mass, no nipple discharge and no skin change. Left breast exhibits no mass, no nipple discharge and no skin change. Lungs clear to auscultation bilaterally.    Ab MCHC 34.8 31.0 - 37.0 g/dL    RDW-SD 39.3 35.1 - 46.3 fL    RDW 12.1 11.0 - 15.0 %    .0 150.0 - 450.0 10(3)uL    Neutrophil Absolute Prelim 4.10 1.50 - 7.70 x10 (3) uL    Neutrophil Absolute 4.10 1.50 - 7.70 x10(3) uL    Lymphocyte Absolute 3.84 difficult in staying active. Attempt to keep a schedule that includes adequate sleep, and physical activities/exercise. Patient was educated on sexual transmitted disease. Best to abstain from sexual intercourse until she is ready to form a family.  Use of

## 2019-07-24 ENCOUNTER — OFFICE VISIT (OUTPATIENT)
Dept: FAMILY MEDICINE CLINIC | Facility: CLINIC | Age: 34
End: 2019-07-24

## 2019-07-24 VITALS
HEIGHT: 64 IN | DIASTOLIC BLOOD PRESSURE: 67 MMHG | BODY MASS INDEX: 31.58 KG/M2 | HEART RATE: 73 BPM | WEIGHT: 185 LBS | SYSTOLIC BLOOD PRESSURE: 106 MMHG | TEMPERATURE: 98 F

## 2019-07-24 DIAGNOSIS — G89.29 OTHER CHRONIC BACK PAIN: Primary | ICD-10-CM

## 2019-07-24 DIAGNOSIS — M54.9 OTHER CHRONIC BACK PAIN: Primary | ICD-10-CM

## 2019-07-24 PROCEDURE — 97810 ACUP 1/> WO ESTIM 1ST 15 MIN: CPT | Performed by: FAMILY MEDICINE

## 2019-07-24 PROCEDURE — 99213 OFFICE O/P EST LOW 20 MIN: CPT | Performed by: FAMILY MEDICINE

## 2019-07-30 ENCOUNTER — HOSPITAL ENCOUNTER (OUTPATIENT)
Dept: MAMMOGRAPHY | Facility: HOSPITAL | Age: 34
Discharge: HOME | End: 2019-07-30
Attending: FAMILY MEDICINE
Payer: COMMERCIAL

## 2019-07-30 ENCOUNTER — TELEPHONE (OUTPATIENT)
Dept: FAMILY MEDICINE CLINIC | Facility: CLINIC | Age: 34
End: 2019-07-30

## 2019-07-30 ENCOUNTER — HOSPITAL ENCOUNTER (OUTPATIENT)
Dept: ULTRASOUND IMAGING | Facility: HOSPITAL | Age: 34
Discharge: HOME | End: 2019-07-30
Attending: FAMILY MEDICINE
Payer: COMMERCIAL

## 2019-07-30 DIAGNOSIS — N63.20 LEFT BREAST MASS: ICD-10-CM

## 2019-07-30 PROCEDURE — 77066 DX MAMMO INCL CAD BI: CPT | Performed by: FAMILY MEDICINE

## 2019-07-30 PROCEDURE — 76642 ULTRASOUND BREAST LIMITED: CPT | Performed by: FAMILY MEDICINE

## 2019-07-30 PROCEDURE — 77062 BREAST TOMOSYNTHESIS BI: CPT | Performed by: FAMILY MEDICINE

## 2019-07-30 NOTE — TELEPHONE ENCOUNTER
Per pt she contacted the surgeon (Claudia Tao) that Dr. Walker Dials her to and they stated since they are part of [facility] they need the actual order sent to them or will have to refer pt to Bharath Wilcox. pls advise. Thank you.

## 2019-07-31 NOTE — PROGRESS NOTES
HPI:    Patient ID: Eunice Chiu is a 29year old female. Patient here for chronic back pain in the thoracic spine area and trapezius area. Feeling much better. Review of Systems   Constitutional: Negative.   Negative for activit

## 2019-07-31 NOTE — PROCEDURES
flakito treatment.   huma points  No complications   29 silver used   1 unused, all discarded  13 read needles

## 2019-08-13 PROBLEM — N60.12 FIBROCYSTIC BREAST CHANGES, BILATERAL: Status: ACTIVE | Noted: 2019-08-13

## 2019-08-13 PROBLEM — N60.11 FIBROCYSTIC BREAST CHANGES, BILATERAL: Status: ACTIVE | Noted: 2019-08-13

## 2019-08-13 PROBLEM — N60.02 BREAST CYST, LEFT: Status: ACTIVE | Noted: 2019-08-13

## 2019-08-13 PROCEDURE — 88108 CYTOPATH CONCENTRATE TECH: CPT | Performed by: SURGERY

## 2019-08-19 ENCOUNTER — HOSPITAL ENCOUNTER (EMERGENCY)
Facility: HOSPITAL | Age: 34
Discharge: HOME | End: 2019-08-19
Attending: EMERGENCY MEDICINE
Payer: COMMERCIAL

## 2019-08-19 VITALS
HEART RATE: 68 BPM | DIASTOLIC BLOOD PRESSURE: 58 MMHG | WEIGHT: 185.19 LBS | OXYGEN SATURATION: 100 % | BODY MASS INDEX: 32 KG/M2 | TEMPERATURE: 98 F | RESPIRATION RATE: 18 BRPM | SYSTOLIC BLOOD PRESSURE: 109 MMHG

## 2019-08-19 DIAGNOSIS — T78.40XA ALLERGIC REACTION, INITIAL ENCOUNTER: Primary | ICD-10-CM

## 2019-08-19 PROCEDURE — 99284 EMERGENCY DEPT VISIT MOD MDM: CPT

## 2019-08-19 PROCEDURE — S0028 INJECTION, FAMOTIDINE, 20 MG: HCPCS | Performed by: EMERGENCY MEDICINE

## 2019-08-19 PROCEDURE — 96374 THER/PROPH/DIAG INJ IV PUSH: CPT

## 2019-08-19 RX ORDER — PREDNISONE 20 MG/1
40 TABLET ORAL DAILY
Qty: 10 TABLET | Refills: 0 | Status: SHIPPED | OUTPATIENT
Start: 2019-08-19 | End: 2019-08-24

## 2019-08-19 RX ORDER — PREDNISONE 20 MG/1
40 TABLET ORAL ONCE
Status: COMPLETED | OUTPATIENT
Start: 2019-08-19 | End: 2019-08-19

## 2019-08-19 RX ORDER — DIPHENHYDRAMINE HCL 25 MG
25 CAPSULE ORAL EVERY 6 HOURS PRN
Qty: 20 CAPSULE | Refills: 0 | Status: SHIPPED | OUTPATIENT
Start: 2019-08-19 | End: 2020-01-03

## 2019-08-19 RX ORDER — FAMOTIDINE 20 MG/1
20 TABLET ORAL 2 TIMES DAILY PRN
Qty: 10 TABLET | Refills: 0 | Status: SHIPPED | OUTPATIENT
Start: 2019-08-19 | End: 2019-08-24

## 2019-08-19 RX ORDER — FAMOTIDINE 10 MG/ML
20 INJECTION, SOLUTION INTRAVENOUS ONCE
Status: COMPLETED | OUTPATIENT
Start: 2019-08-19 | End: 2019-08-19

## 2019-08-19 NOTE — ED PROVIDER NOTES
Patient Seen in: [facility] AND [facility] Emergency Department    History   Patient presents with:   Allergic Rxn Allergies (immune)    Stated Complaint:     HPI    40-year-old female patient with a past medical history significant for ibuprofen a states that so tender  : No CVA tenderness  Skin: No rash, no lesions  Musculoskeletal: Symmetric, no deformity, no injuries  Neuro: Normal speech, nonfocal examination  Psych: Appropriate, not agitated      ED Course   Labs Reviewed - No data to display       Feeling

## 2019-08-19 NOTE — ED NOTES
Pt to the ed with throat soreness and swelling after she state that she took a medication unsure of the name she got in [facility] for her pain. Pt is allergic to ibuprofen and she believes that ibuprofen may have been in pain med d/t her symptoms.  Pt shows no

## 2019-08-19 NOTE — ED INITIAL ASSESSMENT (HPI)
Pt states that she took some medicine for a headache today and 20 minutes later began to feel some nasal congestion and itchy throat. Given benadryl by the paramedics.

## 2019-10-03 ENCOUNTER — TELEPHONE (OUTPATIENT)
Dept: FAMILY MEDICINE CLINIC | Facility: CLINIC | Age: 34
End: 2019-10-03

## 2019-10-03 NOTE — TELEPHONE ENCOUNTER
Patient is calling states she received a letter stating she did not do this Ultrasound   US BREAST LEFT ([facility] SAME DAY US) (CPT=76641)patient is asking if she needs to get it done she states she had a mammogram and an ultrasound done. Please Advise.

## 2019-11-12 PROBLEM — Z80.3 FAMILY HISTORY OF BREAST CANCER: Status: ACTIVE | Noted: 2019-11-12

## 2019-11-13 ENCOUNTER — OFFICE VISIT (OUTPATIENT)
Dept: FAMILY MEDICINE CLINIC | Facility: CLINIC | Age: 34
End: 2019-11-13

## 2019-11-13 VITALS
DIASTOLIC BLOOD PRESSURE: 79 MMHG | WEIGHT: 189 LBS | SYSTOLIC BLOOD PRESSURE: 127 MMHG | HEART RATE: 79 BPM | BODY MASS INDEX: 32.27 KG/M2 | HEIGHT: 64 IN

## 2019-11-13 DIAGNOSIS — N89.8 VAGINAL DISCHARGE: ICD-10-CM

## 2019-11-13 DIAGNOSIS — N94.9 VAGINAL BURNING: Primary | ICD-10-CM

## 2019-11-13 PROCEDURE — 81002 URINALYSIS NONAUTO W/O SCOPE: CPT | Performed by: NURSE PRACTITIONER

## 2019-11-13 PROCEDURE — 99213 OFFICE O/P EST LOW 20 MIN: CPT | Performed by: NURSE PRACTITIONER

## 2019-11-13 RX ORDER — FLUCONAZOLE 200 MG/1
TABLET ORAL
Qty: 2 TABLET | Refills: 0 | Status: SHIPPED | OUTPATIENT
Start: 2019-11-13 | End: 2020-01-03

## 2019-11-13 NOTE — PATIENT INSTRUCTIONS
Infección vaginal por cándida [BVLUDNG Vaginal Infection]    Usted tiene roland infección vaginal por cándida. También se conoce guillaume roland infección por Hammad Harps. Por lo general es causada por un tipo de levadura (hongo) llamada cándida.  La cándida se encuen · Tiene un dolor nuevo en la parte baja del estómago o en la región pélvica. · Tiene efectos secundarios que le molestan o roland reacción a la crema o a las pastillas que le recetaron.   · Usted o almonte(s) robert(s) sexual(es) tiene(n) nuevos síntomas, guillaume un

## 2019-11-13 NOTE — PROGRESS NOTES
HPI    Patient presents with vaginal burning and itching that has been present for the past week. Feels like urine smells stronger than normal.  Has vaginal discharge that is yellowish.       Review of Systems   Genitourinary: Positive for vaginal discharg Relationships      Social connections:        Talks on phone: Not on file        Gets together: Not on file        Attends Lutheran service: Not on file        Active member of club or organization: Not on file        Attends meetings of clubs or [place] Cardiovascular: Normal rate, regular rhythm and normal heart sounds. No murmur heard. Pulmonary/Chest: Effort normal and breath sounds normal. No respiratory distress. She has no wheezes. Genitourinary:    Vaginal discharge present.      Lymphadenop

## 2019-11-15 ENCOUNTER — TELEPHONE (OUTPATIENT)
Dept: FAMILY MEDICINE CLINIC | Facility: CLINIC | Age: 34
End: 2019-11-15

## 2019-11-15 DIAGNOSIS — B37.3 CANDIDAL VAGINITIS: Primary | ICD-10-CM

## 2019-11-16 RX ORDER — FLUCONAZOLE 200 MG/1
200 TABLET ORAL DAILY
Qty: 1 TABLET | Refills: 0 | Status: SHIPPED | OUTPATIENT
Start: 2019-11-16 | End: 2020-01-03

## 2019-11-16 NOTE — TELEPHONE ENCOUNTER
Called patient, discussed yeast infection results. Still having some irritation and vulvar rash/discharge. Will send 1 more tab of Diflucan as well as clotrimazole topical.  Advised her to start taking probiotics. Pt amenable to tx.

## 2019-11-16 NOTE — TELEPHONE ENCOUNTER
With , patient calling regarding her 11/13/19 results. Patient stated that took the fluconazole on 11/13/19 and the second tablet yesterday. Today feeling actually worse.  Vaginal itching, white vaginal discharge, with odor to discharge an

## 2020-01-03 ENCOUNTER — OFFICE VISIT (OUTPATIENT)
Dept: FAMILY MEDICINE CLINIC | Facility: CLINIC | Age: 35
End: 2020-01-03

## 2020-01-03 VITALS
HEIGHT: 64 IN | BODY MASS INDEX: 32.27 KG/M2 | DIASTOLIC BLOOD PRESSURE: 78 MMHG | WEIGHT: 189 LBS | SYSTOLIC BLOOD PRESSURE: 118 MMHG | HEART RATE: 56 BPM

## 2020-01-03 DIAGNOSIS — M54.9 CHRONIC LEFT-SIDED BACK PAIN, UNSPECIFIED BACK LOCATION: Primary | ICD-10-CM

## 2020-01-03 DIAGNOSIS — G89.29 CHRONIC LEFT-SIDED BACK PAIN, UNSPECIFIED BACK LOCATION: Primary | ICD-10-CM

## 2020-01-03 PROCEDURE — 99213 OFFICE O/P EST LOW 20 MIN: CPT | Performed by: NURSE PRACTITIONER

## 2020-01-03 NOTE — PROGRESS NOTES
HPI    Pt here for left sided back pain for past 6 months. Has tried stretching, muscle relaxants, tylenol.  (allergic to ibuprofen). Stands a lot at work. Denies radiation of pain  Review of Systems   Constitutional: Positive for activity change.    Yaquelin Stress: Not on file    Relationships      Social connections:        Talks on phone: Not on file        Gets together: Not on file        Attends Pentecostalism service: Not on file        Active member of club or organization: Not on file        Attends me Discussed plan of care with pt and pt is in agreement. All questions answered. Pt to call with questions or concerns. Encouraged to sign up for My Chart if not already registered.

## 2020-01-21 ENCOUNTER — OFFICE VISIT (OUTPATIENT)
Dept: NEUROLOGY | Facility: CLINIC | Age: 35
End: 2020-01-21

## 2020-01-21 ENCOUNTER — TELEPHONE (OUTPATIENT)
Dept: NEUROLOGY | Facility: CLINIC | Age: 35
End: 2020-01-21

## 2020-01-21 VITALS
HEIGHT: 64 IN | DIASTOLIC BLOOD PRESSURE: 70 MMHG | WEIGHT: 189 LBS | SYSTOLIC BLOOD PRESSURE: 102 MMHG | RESPIRATION RATE: 18 BRPM | BODY MASS INDEX: 32.27 KG/M2 | HEART RATE: 69 BPM

## 2020-01-21 DIAGNOSIS — E66.09 CLASS 1 OBESITY DUE TO EXCESS CALORIES WITHOUT SERIOUS COMORBIDITY WITH BODY MASS INDEX (BMI) OF 32.0 TO 32.9 IN ADULT: ICD-10-CM

## 2020-01-21 DIAGNOSIS — M54.59 MECHANICAL LOW BACK PAIN: ICD-10-CM

## 2020-01-21 DIAGNOSIS — M62.830 LUMBAR PARASPINAL MUSCLE SPASM: ICD-10-CM

## 2020-01-21 DIAGNOSIS — S39.012A STRAIN OF LUMBAR PARASPINOUS MUSCLE, INITIAL ENCOUNTER: ICD-10-CM

## 2020-01-21 DIAGNOSIS — R20.8 NUMBNESS OF LEFT FOOT: ICD-10-CM

## 2020-01-21 DIAGNOSIS — M47.816 FACET SYNDROME, LUMBAR: ICD-10-CM

## 2020-01-21 DIAGNOSIS — M54.16 LEFT LUMBAR RADICULITIS: Primary | ICD-10-CM

## 2020-01-21 PROBLEM — R20.0 NUMBNESS OF LEFT FOOT: Status: ACTIVE | Noted: 2020-01-21

## 2020-01-21 PROCEDURE — 99244 OFF/OP CNSLTJ NEW/EST MOD 40: CPT | Performed by: PHYSICAL MEDICINE & REHABILITATION

## 2020-01-21 RX ORDER — DULOXETIN HYDROCHLORIDE 30 MG/1
30 CAPSULE, DELAYED RELEASE ORAL NIGHTLY
Qty: 30 CAPSULE | Refills: 1 | Status: SHIPPED | OUTPATIENT
Start: 2020-01-21 | End: 2020-06-10

## 2020-01-21 NOTE — H&P
[facility]  [facility] H&P    Requesting Physician: Darlene Arriaga DO    Chief Complaint (Reason for Visit):  Patient presents with:  Back Pain: Pt is present with back pain       History of Present Illness Particles Take 1 capsule (30 mg total) by mouth nightly. 30 capsule 1        ALLERGIES:     Amoxicillin             RASH  Ibuprofen               SHORTNESS OF BREATH      REVIEW OF SYSTEMS:   Review of Systems   Constitutional: Negative. HENT: Negative. rotation to the left over the left low back  MELANIE: Negative  Straight leg raise: Positive on the left for hamstring tightness  Slump test: Positive on the left for radiating pain to the left buttock and hamstring.   The patient was able to state this was n Specimens:   A) - Breast, left, 1 cytolyt                                                                        B) - Breast, left, 1 cytolyt                                                              • Final Diagnosi ASSESSMENT AND PLAN:  The patient is a 42-year-old female who is coming in complaining of left-sided low back pain which radiates to the left buttock. I have recommended formal physical therapy which she would like to do at our [address] [place] [address] office.   I

## 2020-01-21 NOTE — TELEPHONE ENCOUNTER
Sending P clinical notes for authorization of 8 PT sessions.  CPT Codes: 04650,35353-[id number] approval

## 2020-01-21 NOTE — PATIENT INSTRUCTIONS
1) Start formal physical therapy at our [address] [place] [address] office  2) My office will call you once the MRI is approved by your insurance.  You should then schedule the MRI and call my office again to make an appointment with me 2-3 days after your exam for review of y

## 2020-01-21 NOTE — PROGRESS NOTES
Location of pain: back    Rate your pain: 7   Timeline of pain: months    Characterize the pain: sharp    Worse with: working   Better with: nothing  Medications used: OTC  Previous Injections: none  Previous Imaging: none  Previous Therapies for this cond

## 2020-01-29 ENCOUNTER — TELEPHONE (OUTPATIENT)
Dept: OTHER | Age: 35
End: 2020-01-29

## 2020-01-29 NOTE — TELEPHONE ENCOUNTER
It does take more than a couple of days for the cymbalta to start working. I would recommend contacting Dr Robert Goodwin office for f/u as pt is unable to take nsaids.  She should use ice 20 min 4-6 times per day for paraspial muscle spasm

## 2020-01-29 NOTE — TELEPHONE ENCOUNTER
With assist of Language Line spoke with pt and APN recommendations given. Pt verb understanding and agree to plan.

## 2020-01-29 NOTE — TELEPHONE ENCOUNTER
Pt calling to report she had OV with specialist and still has \"a lot of pain\"    Pt has tried the Cymbalta without relief. Dr Nitza Du has put in an order for PT and pt will start 2/5/20    Pt seeking recommendations.   Please advise

## 2020-02-12 ENCOUNTER — OFFICE VISIT (OUTPATIENT)
Dept: PHYSICAL THERAPY | Age: 35
End: 2020-02-12
Attending: PHYSICAL MEDICINE & REHABILITATION
Payer: COMMERCIAL

## 2020-02-12 DIAGNOSIS — M62.830 LUMBAR PARASPINAL MUSCLE SPASM: ICD-10-CM

## 2020-02-12 DIAGNOSIS — M54.16 LEFT LUMBAR RADICULITIS: ICD-10-CM

## 2020-02-12 DIAGNOSIS — M54.59 MECHANICAL LOW BACK PAIN: ICD-10-CM

## 2020-02-12 DIAGNOSIS — R20.8 NUMBNESS OF LEFT FOOT: ICD-10-CM

## 2020-02-12 DIAGNOSIS — S39.012A STRAIN OF LUMBAR PARASPINOUS MUSCLE, INITIAL ENCOUNTER: ICD-10-CM

## 2020-02-12 DIAGNOSIS — M47.816 FACET SYNDROME, LUMBAR: ICD-10-CM

## 2020-02-12 PROCEDURE — 97161 PT EVAL LOW COMPLEX 20 MIN: CPT

## 2020-02-12 NOTE — PROGRESS NOTES
P.T. EVALUATION:   Referring Physician: Dr. Abe Howard  Diagnosis: Mechanical low back pain (M54.5)  Facet syndrome, lumbar (M47.816)  Numbness of left foot (R20.8)  Strain of lumbar paraspinous muscle, initial encounter (N89.482O)  Lumbar paraspinal muscle sp OBJECTIVE:   Observation: pt ambulates independently into clinic    Palpation: tension noted along left lumbar paraspinal musculature    Sensation: intermittent L foot numbness     AROM:   Lumbar ROM:  Flx: WNL (NE/Left leg stretch)  Ext: Min loss (L middl will include: Modalities prn, manual therapy, therapeutic exercises, therapeutic activity, and instructions on a home program.     Education or treatment limitation: None  Rehab Potential:good    Patient was advised of these findings, precautions, and laci

## 2020-02-19 ENCOUNTER — APPOINTMENT (OUTPATIENT)
Dept: PHYSICAL THERAPY | Age: 35
End: 2020-02-19
Attending: PHYSICAL MEDICINE & REHABILITATION
Payer: COMMERCIAL

## 2020-02-24 ENCOUNTER — TELEPHONE (OUTPATIENT)
Dept: PHYSICAL THERAPY | Age: 35
End: 2020-02-24

## 2020-02-26 ENCOUNTER — OFFICE VISIT (OUTPATIENT)
Dept: PHYSICAL THERAPY | Age: 35
End: 2020-02-26
Attending: PHYSICAL MEDICINE & REHABILITATION
Payer: COMMERCIAL

## 2020-02-26 DIAGNOSIS — M47.816 FACET SYNDROME, LUMBAR: ICD-10-CM

## 2020-02-26 DIAGNOSIS — M54.59 MECHANICAL LOW BACK PAIN: ICD-10-CM

## 2020-02-26 DIAGNOSIS — R20.8 NUMBNESS OF LEFT FOOT: ICD-10-CM

## 2020-02-26 DIAGNOSIS — M54.16 LEFT LUMBAR RADICULITIS: ICD-10-CM

## 2020-02-26 DIAGNOSIS — S39.012A STRAIN OF LUMBAR PARASPINOUS MUSCLE, INITIAL ENCOUNTER: ICD-10-CM

## 2020-02-26 DIAGNOSIS — M62.830 LUMBAR PARASPINAL MUSCLE SPASM: ICD-10-CM

## 2020-02-26 PROCEDURE — 97110 THERAPEUTIC EXERCISES: CPT

## 2020-02-26 NOTE — PROGRESS NOTES
Diagnosis: Mechanical low back pain (M54.5)  Facet syndrome, lumbar (M47.816)  Numbness of left foot (R20.8)  Strain of lumbar paraspinous muscle, initial encounter (G38.479I)  Lumbar paraspinal muscle spasm (M62.830)  Left lumbar radiculitis (M54.16)    D supine R/L hamstring/gastroc stretch with belt 2 x 30 seconds each (per patient left side tighter)  - supine R/L SLR 2 x 10 each   - bridging 3 x 10   - hooklying fitness [place] hip ER 5' holds x 10   - hooklying R/L BKFO with GTB above knees 5' holds 2 x

## 2020-03-02 ENCOUNTER — OFFICE VISIT (OUTPATIENT)
Dept: PHYSICAL THERAPY | Age: 35
End: 2020-03-02
Attending: PHYSICAL MEDICINE & REHABILITATION
Payer: COMMERCIAL

## 2020-03-02 DIAGNOSIS — M54.59 MECHANICAL LOW BACK PAIN: ICD-10-CM

## 2020-03-02 DIAGNOSIS — M54.16 LEFT LUMBAR RADICULITIS: ICD-10-CM

## 2020-03-02 DIAGNOSIS — M62.830 LUMBAR PARASPINAL MUSCLE SPASM: ICD-10-CM

## 2020-03-02 DIAGNOSIS — R20.8 NUMBNESS OF LEFT FOOT: ICD-10-CM

## 2020-03-02 DIAGNOSIS — M47.816 FACET SYNDROME, LUMBAR: ICD-10-CM

## 2020-03-02 DIAGNOSIS — S39.012A STRAIN OF LUMBAR PARASPINOUS MUSCLE, INITIAL ENCOUNTER: ICD-10-CM

## 2020-03-02 PROCEDURE — 97110 THERAPEUTIC EXERCISES: CPT

## 2020-03-02 NOTE — PROGRESS NOTES
Diagnosis: Mechanical low back pain (M54.5)  Facet syndrome, lumbar (M47.816)  Numbness of left foot (R20.8)  Strain of lumbar paraspinous muscle, initial encounter (H83.520T)  Lumbar paraspinal muscle spasm (M62.830)  Left lumbar radiculitis (M54.16)    D 2 x 10   - PPU 1 x 10   - prone lumbar PA mobs grade 1-2 x 5 min  - PPU 1 x 10 (dec better, L L/E)  - prone R/L hip extension 3 x 10 each   - PPU 1 x 10 (abolished L L/E)  - prone R/L hip extension with knee flexed 3 x 10 each with pillow under stomach   - sitting    Plan: Reassess patient symptoms.      Charges: EX 3       Total Timed Treatment: 45 min  Total Treatment Time: 45 min

## 2020-03-04 ENCOUNTER — TELEPHONE (OUTPATIENT)
Dept: PHYSICAL THERAPY | Age: 35
End: 2020-03-04

## 2020-03-09 ENCOUNTER — OFFICE VISIT (OUTPATIENT)
Dept: PHYSICAL THERAPY | Age: 35
End: 2020-03-09
Attending: PHYSICAL MEDICINE & REHABILITATION
Payer: COMMERCIAL

## 2020-03-09 DIAGNOSIS — R20.8 NUMBNESS OF LEFT FOOT: ICD-10-CM

## 2020-03-09 DIAGNOSIS — S39.012A STRAIN OF LUMBAR PARASPINOUS MUSCLE, INITIAL ENCOUNTER: ICD-10-CM

## 2020-03-09 DIAGNOSIS — M54.16 LEFT LUMBAR RADICULITIS: ICD-10-CM

## 2020-03-09 DIAGNOSIS — M54.59 MECHANICAL LOW BACK PAIN: ICD-10-CM

## 2020-03-09 DIAGNOSIS — M62.830 LUMBAR PARASPINAL MUSCLE SPASM: ICD-10-CM

## 2020-03-09 DIAGNOSIS — M47.816 FACET SYNDROME, LUMBAR: ICD-10-CM

## 2020-03-09 PROCEDURE — 97110 THERAPEUTIC EXERCISES: CPT

## 2020-03-09 NOTE — PROGRESS NOTES
Diagnosis: Mechanical low back pain (M54.5)  Facet syndrome, lumbar (M47.816)  Numbness of left foot (R20.8)  Strain of lumbar paraspinous muscle, initial encounter (N69.434S)  Lumbar paraspinal muscle spasm (M62.830)  Left lumbar radiculitis (M54.16)    D Thera Ex   - prone glute sets 5\" holds 1 x 10   - PPU 1 x 10 with self OP 1 x 10   - prone R/L hip extension 2 x 10 each   - PPU 1 x 10 (dec better, L L/E)   - prone R/L hip extension with knee flexed 3 x 10 each with pillow under stomach (abolished L L bridging 3 x 10   - hooklying fitness [place] hip ER 5' holds x 10   - hooklying R/L BKFO with GTB above knees 5' holds 2 x 10 each  - supine SB bridging 2 x 10   - sidelying R/L hip abduction 2 x 10 each   - sidelying R/L clam with RTB above knee 1 x 10 ea

## 2020-03-11 ENCOUNTER — OFFICE VISIT (OUTPATIENT)
Dept: PHYSICAL THERAPY | Age: 35
End: 2020-03-11
Attending: PHYSICAL MEDICINE & REHABILITATION
Payer: COMMERCIAL

## 2020-03-11 DIAGNOSIS — S39.012A STRAIN OF LUMBAR PARASPINOUS MUSCLE, INITIAL ENCOUNTER: ICD-10-CM

## 2020-03-11 DIAGNOSIS — M54.16 LEFT LUMBAR RADICULITIS: ICD-10-CM

## 2020-03-11 DIAGNOSIS — M54.59 MECHANICAL LOW BACK PAIN: ICD-10-CM

## 2020-03-11 DIAGNOSIS — M47.816 FACET SYNDROME, LUMBAR: ICD-10-CM

## 2020-03-11 DIAGNOSIS — M62.830 LUMBAR PARASPINAL MUSCLE SPASM: ICD-10-CM

## 2020-03-11 DIAGNOSIS — R20.8 NUMBNESS OF LEFT FOOT: ICD-10-CM

## 2020-03-11 PROCEDURE — 97110 THERAPEUTIC EXERCISES: CPT

## 2020-03-11 NOTE — PROGRESS NOTES
Diagnosis: Mechanical low back pain (M54.5)  Facet syndrome, lumbar (M47.816)  Numbness of left foot (R20.8)  Strain of lumbar paraspinous muscle, initial encounter (P14.299D)  Lumbar paraspinal muscle spasm (M62.830)  Left lumbar radiculitis (M54.16)    D 2/26/2020  Visit#: 2/8 [employer] O (EXP. 4/20/20)   Thera Ex   - prone glute sets 5\" holds 1 x 10   - PPU 1 x 10 with self OP 1 x 10   - prone R/L hip extension 3 x 10 each   - PPU with belt OP 4 x 10 (abolish L L/E)  - prone R/L hip extension with knee flex strap (L side tighter per patient)  - PPU 1 x 10   - supine R/L hamstring/gastroc stretch with belt 2 x 30 seconds ea  - supine R/L SLR 2 x 10 each   - bridging 3 x 10   - hooklying hip adduction squeeze of ball 5\" holds 2 x 10   - supine SB bridging 2 x

## 2020-03-24 ENCOUNTER — APPOINTMENT (OUTPATIENT)
Dept: PHYSICAL THERAPY | Age: 35
End: 2020-03-24
Attending: PHYSICAL MEDICINE & REHABILITATION
Payer: COMMERCIAL

## 2020-03-26 ENCOUNTER — APPOINTMENT (OUTPATIENT)
Dept: PHYSICAL THERAPY | Age: 35
End: 2020-03-26
Attending: PHYSICAL MEDICINE & REHABILITATION
Payer: COMMERCIAL

## 2020-04-08 ENCOUNTER — TELEPHONE (OUTPATIENT)
Dept: PHYSICAL THERAPY | Age: 35
End: 2020-04-08

## 2020-04-08 NOTE — TELEPHONE ENCOUNTER
Pt unavailable, LM. Contacted pt to explain that due to continued COVID-19 outbreak, non-essential outpatient rehab patient care has been delayed until 5/4/20. The patient has an HMO that expires 4/30/20.  Will send an in-basket to [place] [facility] pool to exten

## 2020-05-01 ENCOUNTER — TELEPHONE (OUTPATIENT)
Dept: PHYSICAL THERAPY | Age: 35
End: 2020-05-01

## 2020-05-06 ENCOUNTER — APPOINTMENT (OUTPATIENT)
Dept: PHYSICAL THERAPY | Age: 35
End: 2020-05-06
Attending: PHYSICAL MEDICINE & REHABILITATION
Payer: COMMERCIAL

## 2020-05-08 ENCOUNTER — APPOINTMENT (OUTPATIENT)
Dept: PHYSICAL THERAPY | Age: 35
End: 2020-05-08
Attending: PHYSICAL MEDICINE & REHABILITATION
Payer: COMMERCIAL

## 2020-05-11 ENCOUNTER — APPOINTMENT (OUTPATIENT)
Dept: PHYSICAL THERAPY | Age: 35
End: 2020-05-11
Attending: PHYSICAL MEDICINE & REHABILITATION
Payer: COMMERCIAL

## 2020-06-10 ENCOUNTER — TELEMEDICINE (OUTPATIENT)
Dept: NEUROLOGY | Facility: CLINIC | Age: 35
End: 2020-06-10

## 2020-06-10 ENCOUNTER — APPOINTMENT (OUTPATIENT)
Dept: PHYSICAL THERAPY | Age: 35
End: 2020-06-10
Attending: PHYSICAL MEDICINE & REHABILITATION
Payer: COMMERCIAL

## 2020-06-10 ENCOUNTER — TELEPHONE (OUTPATIENT)
Dept: NEUROLOGY | Facility: CLINIC | Age: 35
End: 2020-06-10

## 2020-06-10 DIAGNOSIS — R20.8 NUMBNESS OF LEFT FOOT: ICD-10-CM

## 2020-06-10 DIAGNOSIS — M54.50 CHRONIC BILATERAL LOW BACK PAIN WITHOUT SCIATICA: Primary | ICD-10-CM

## 2020-06-10 DIAGNOSIS — M54.59 MECHANICAL LOW BACK PAIN: ICD-10-CM

## 2020-06-10 DIAGNOSIS — R29.898 LEFT LEG WEAKNESS: ICD-10-CM

## 2020-06-10 DIAGNOSIS — M47.816 FACET SYNDROME, LUMBAR: ICD-10-CM

## 2020-06-10 DIAGNOSIS — R29.898 LEFT ARM WEAKNESS: ICD-10-CM

## 2020-06-10 DIAGNOSIS — G89.29 CHRONIC BILATERAL LOW BACK PAIN WITHOUT SCIATICA: Primary | ICD-10-CM

## 2020-06-10 DIAGNOSIS — S39.012A STRAIN OF LUMBAR PARASPINOUS MUSCLE, INITIAL ENCOUNTER: ICD-10-CM

## 2020-06-10 DIAGNOSIS — M54.16 LEFT LUMBAR RADICULITIS: ICD-10-CM

## 2020-06-10 DIAGNOSIS — M62.830 LUMBAR PARASPINAL MUSCLE SPASM: ICD-10-CM

## 2020-06-10 PROCEDURE — 99214 OFFICE O/P EST MOD 30 MIN: CPT | Performed by: PHYSICAL MEDICINE & REHABILITATION

## 2020-06-17 ENCOUNTER — APPOINTMENT (OUTPATIENT)
Dept: PHYSICAL THERAPY | Age: 35
End: 2020-06-17
Attending: PHYSICAL MEDICINE & REHABILITATION
Payer: COMMERCIAL

## 2020-06-22 ENCOUNTER — HOSPITAL ENCOUNTER (OUTPATIENT)
Dept: GENERAL RADIOLOGY | Age: 35
Discharge: HOME | End: 2020-06-22
Attending: PHYSICAL MEDICINE & REHABILITATION
Payer: COMMERCIAL

## 2020-06-22 ENCOUNTER — HOSPITAL ENCOUNTER (OUTPATIENT)
Dept: MRI IMAGING | Age: 35
Discharge: HOME | End: 2020-06-22
Attending: PHYSICAL MEDICINE & REHABILITATION
Payer: COMMERCIAL

## 2020-06-22 DIAGNOSIS — G89.29 CHRONIC BILATERAL LOW BACK PAIN WITHOUT SCIATICA: ICD-10-CM

## 2020-06-22 DIAGNOSIS — S39.012A STRAIN OF LUMBAR PARASPINOUS MUSCLE, INITIAL ENCOUNTER: ICD-10-CM

## 2020-06-22 DIAGNOSIS — R20.8 NUMBNESS OF LEFT FOOT: ICD-10-CM

## 2020-06-22 DIAGNOSIS — M54.50 CHRONIC BILATERAL LOW BACK PAIN WITHOUT SCIATICA: ICD-10-CM

## 2020-06-22 DIAGNOSIS — M54.16 LEFT LUMBAR RADICULITIS: ICD-10-CM

## 2020-06-22 DIAGNOSIS — R29.898 LEFT ARM WEAKNESS: ICD-10-CM

## 2020-06-22 DIAGNOSIS — M47.816 FACET SYNDROME, LUMBAR: ICD-10-CM

## 2020-06-22 DIAGNOSIS — R29.898 LEFT LEG WEAKNESS: ICD-10-CM

## 2020-06-22 DIAGNOSIS — M62.830 LUMBAR PARASPINAL MUSCLE SPASM: ICD-10-CM

## 2020-06-22 DIAGNOSIS — M54.59 MECHANICAL LOW BACK PAIN: ICD-10-CM

## 2020-06-22 PROCEDURE — 72050 X-RAY EXAM NECK SPINE 4/5VWS: CPT | Performed by: PHYSICAL MEDICINE & REHABILITATION

## 2020-06-22 PROCEDURE — 72141 MRI NECK SPINE W/O DYE: CPT | Performed by: PHYSICAL MEDICINE & REHABILITATION

## 2020-06-22 PROCEDURE — 72148 MRI LUMBAR SPINE W/O DYE: CPT | Performed by: PHYSICAL MEDICINE & REHABILITATION

## 2020-06-24 ENCOUNTER — APPOINTMENT (OUTPATIENT)
Dept: PHYSICAL THERAPY | Age: 35
End: 2020-06-24
Attending: PHYSICAL MEDICINE & REHABILITATION
Payer: COMMERCIAL

## 2020-06-25 ENCOUNTER — TELEMEDICINE (OUTPATIENT)
Dept: NEUROLOGY | Facility: CLINIC | Age: 35
End: 2020-06-25

## 2020-06-25 DIAGNOSIS — M54.16 LEFT LUMBAR RADICULITIS: ICD-10-CM

## 2020-06-25 DIAGNOSIS — M54.50 CHRONIC BILATERAL LOW BACK PAIN WITHOUT SCIATICA: Primary | ICD-10-CM

## 2020-06-25 DIAGNOSIS — M62.830 LUMBAR PARASPINAL MUSCLE SPASM: ICD-10-CM

## 2020-06-25 DIAGNOSIS — R20.8 NUMBNESS OF LEFT FOOT: ICD-10-CM

## 2020-06-25 DIAGNOSIS — E66.09 CLASS 1 OBESITY DUE TO EXCESS CALORIES WITHOUT SERIOUS COMORBIDITY WITH BODY MASS INDEX (BMI) OF 32.0 TO 32.9 IN ADULT: ICD-10-CM

## 2020-06-25 DIAGNOSIS — G89.29 CHRONIC BILATERAL LOW BACK PAIN WITHOUT SCIATICA: Primary | ICD-10-CM

## 2020-06-25 DIAGNOSIS — M54.59 MECHANICAL LOW BACK PAIN: ICD-10-CM

## 2020-06-25 DIAGNOSIS — S39.012A STRAIN OF LUMBAR PARASPINOUS MUSCLE, INITIAL ENCOUNTER: ICD-10-CM

## 2020-06-25 DIAGNOSIS — M47.816 FACET SYNDROME, LUMBAR: ICD-10-CM

## 2020-06-25 DIAGNOSIS — R29.898 LEFT LEG WEAKNESS: ICD-10-CM

## 2020-06-25 DIAGNOSIS — R29.898 LEFT ARM WEAKNESS: ICD-10-CM

## 2020-06-25 PROCEDURE — 99213 OFFICE O/P EST LOW 20 MIN: CPT | Performed by: PHYSICAL MEDICINE & REHABILITATION

## 2020-06-25 NOTE — PROGRESS NOTES
[address]  Video Visit Progress Note    Claudetta Huntington verbally consents to a Telemedicine Visit on 06/25/20.  This visit is conducted using Telemedicine with live, interactive audio and vid Cancer Sister 36   • Breast Cancer Sister 40       CURRENT MEDICATIONS:   No current outpatient medications on file.        ALLERGIES:     Amoxicillin             RASH  Ibuprofen               SHORTNESS OF BREATH    REVIEW OF SYSTEMS:   Review of Systems • APPEARANCE 11/13/2019 CLEAR  Clear Final   • Color Urine 11/13/2019 YELLOW  Yellow Final   • Multistix Lot# 11/13/2019 [id number]  Numeric Final   • Multistix Expiration Date 11/13/2019 5-31-20  Date Final   • Genital vaginosis screen 11/13/2019 Negative without significant thecal sac effacement. 3. No significant central spinal stenosis or foraminal narrowing.   4. Cervical cord is normal in caliber and signal.           Dictated by (CST): Ender Allen MD on 6/22/2020 at 5:13 PM       Finalized b fluid in the pelvis, which is likely physiologic.         Remote - PS     Dictated by (CST): Roni Malagon MD on 6/22/2020 at 2:39 PM       Finalized by (CST): Roni Malagon MD on 6/22/2020 at 2:44 PM          XR CERVICAL SPINE (4VIEWS) (CPT=72050)  Wisam assessment and plan. All questions were answered. There were no barriers to learning.     We discussed that a telemedicine visit is in place of an office visit; however, this limits the ability to perform a thorough physical examination which may affect o

## 2020-06-25 NOTE — PATIENT INSTRUCTIONS
1) Please schedule EMG of upper and lower extremities.  Will require 2 separate appointments  2) restart formal physical therapy at our [place] facility  3) Follow up with me after the [facility]

## 2020-07-24 ENCOUNTER — PROCEDURE VISIT (OUTPATIENT)
Dept: NEUROLOGY | Facility: CLINIC | Age: 35
End: 2020-07-24

## 2020-07-24 DIAGNOSIS — G56.22 CUBITAL TUNNEL SYNDROME ON LEFT: Primary | ICD-10-CM

## 2020-07-24 DIAGNOSIS — M79.10 MYALGIA: ICD-10-CM

## 2020-07-24 DIAGNOSIS — R29.898 LEFT ARM WEAKNESS: ICD-10-CM

## 2020-07-24 PROCEDURE — 95913 NRV CNDJ TEST 13/> STUDIES: CPT | Performed by: PHYSICAL MEDICINE & REHABILITATION

## 2020-07-24 PROCEDURE — 95886 MUSC TEST DONE W/N TEST COMP: CPT | Performed by: PHYSICAL MEDICINE & REHABILITATION

## 2020-07-24 PROCEDURE — 95887 MUSC TST DONE W/N TST NONEXT: CPT | Performed by: PHYSICAL MEDICINE & REHABILITATION

## 2020-07-24 NOTE — PROCEDURES
[address]   Nerve Conduction Study and Electromyography Procedure Note      Patient: Cindy calle Hand Dominance:  right   Patient ID: [id number] Referring Dr:  Scott Harkins   Sex: Female Test Dr: Wrist FDI 3.49 12.3 100 4.74 Wrist - FDI 16        B. Elbow FDI 6.41 11.6 94.4 5.00 B. Elbow - Wrist 18 2.92 62      A. Elbow FDI 8.23 10.5 85.8 5.52 A. Elbow - B. Elbow 14 1.82 77       Sensory NCS      Nerve / Sites Rec.  Site Onset Lat Peak Lat NP Amp PP A ? BILATERAL radial nerve conduction study to the thumb demonstrates a normal latency and amplitude. ? BILATERAL median nerve conduction study to Digit III demonstrates a normal latency and amplitude. ?  RIGHT ulnar nerve conduction study to Digit V demons Thank you for allowing us to participate in the care of your patient.       Electronic signature of the attending physician affirms that he/she personally participated in the clinical assessment of this patient, performed or reviewed all electrodiagnostic p

## 2020-07-31 ENCOUNTER — PROCEDURE VISIT (OUTPATIENT)
Dept: NEUROLOGY | Facility: CLINIC | Age: 35
End: 2020-07-31

## 2020-07-31 DIAGNOSIS — M54.16 LEFT LUMBAR RADICULITIS: ICD-10-CM

## 2020-07-31 DIAGNOSIS — R20.8 NUMBNESS OF LEFT FOOT: ICD-10-CM

## 2020-07-31 DIAGNOSIS — M62.830 LUMBAR PARASPINAL MUSCLE SPASM: ICD-10-CM

## 2020-07-31 DIAGNOSIS — G89.29 CHRONIC BILATERAL LOW BACK PAIN WITHOUT SCIATICA: Primary | ICD-10-CM

## 2020-07-31 DIAGNOSIS — R29.898 LEFT LEG WEAKNESS: ICD-10-CM

## 2020-07-31 DIAGNOSIS — M54.50 CHRONIC BILATERAL LOW BACK PAIN WITHOUT SCIATICA: Primary | ICD-10-CM

## 2020-07-31 DIAGNOSIS — M54.59 MECHANICAL LOW BACK PAIN: ICD-10-CM

## 2020-07-31 PROCEDURE — 95911 NRV CNDJ TEST 9-10 STUDIES: CPT | Performed by: PHYSICAL MEDICINE & REHABILITATION

## 2020-07-31 PROCEDURE — 95886 MUSC TEST DONE W/N TEST COMP: CPT | Performed by: PHYSICAL MEDICINE & REHABILITATION

## 2020-07-31 NOTE — PROCEDURES
[address]   Nerve Conduction Study and Electromyography Procedure Note      Patient: Elmer 1:  right handed   Patient ID: [id number] Referring Dr:  Dr. James Guerra   Sex: Female Test D L Tibial - AH      Ankle AH 5.78 10.8 100 4.32 Ankle - AH 8        Pop fossa AH 12.08 10.9 100 4.95 Pop fossa - Ankle 35 6.30 56       Sensory NCS      Nerve / Sites Rec.  Site Onset Lat Peak Lat NP Amp PP Amp Segments Distance Velocity     ms ms µV µV  cm ? The BILATERAL peroneal nerve to the EDB demonstrates normal onset latency, amplitude, and conduction velocity. Sensory Nerve Conduction Studies:  ? The BILATERAL sural nerve to the lateral malleolus demonstrates normal latency and amplitude. ?  The BI

## 2020-11-19 ENCOUNTER — OFFICE VISIT (OUTPATIENT)
Dept: FAMILY MEDICINE CLINIC | Facility: CLINIC | Age: 35
End: 2020-11-19

## 2020-11-19 VITALS
HEIGHT: 64 IN | BODY MASS INDEX: 32.27 KG/M2 | WEIGHT: 189 LBS | HEART RATE: 78 BPM | SYSTOLIC BLOOD PRESSURE: 130 MMHG | DIASTOLIC BLOOD PRESSURE: 70 MMHG

## 2020-11-19 DIAGNOSIS — R10.13 DYSPEPSIA: Primary | ICD-10-CM

## 2020-11-19 PROCEDURE — 3078F DIAST BP <80 MM HG: CPT | Performed by: FAMILY MEDICINE

## 2020-11-19 PROCEDURE — 99213 OFFICE O/P EST LOW 20 MIN: CPT | Performed by: FAMILY MEDICINE

## 2020-11-19 PROCEDURE — 3008F BODY MASS INDEX DOCD: CPT | Performed by: FAMILY MEDICINE

## 2020-11-19 PROCEDURE — 3075F SYST BP GE 130 - 139MM HG: CPT | Performed by: FAMILY MEDICINE

## 2020-11-19 RX ORDER — FAMOTIDINE 40 MG/1
40 TABLET, FILM COATED ORAL 2 TIMES DAILY PRN
Qty: 60 TABLET | Refills: 0 | Status: SHIPPED | OUTPATIENT
Start: 2020-11-19 | End: 2021-02-11 | Stop reason: ALTCHOICE

## 2020-11-19 NOTE — PROGRESS NOTES
11/19/2020  1:08 PM    [address] is a 28year old female. Chief complaint(s): Patient presents with:  Abdominal Pain: upper left side x 3 weeks    HPI:     [address] primary complaint is regarding as above. Constitutional: Negative for chills, fatigue and fever. Respiratory: Negative for shortness of breath. Cardiovascular: Negative for chest pain and palpitations. Gastrointestinal: Positive for abdominal pain (LUQ pain).  Negative for nausea, vomitin (40 mg total) by mouth 2 (two) times daily as needed for Heartburn.        Imaging & Referrals:  None         Themla Payan MD

## 2020-12-07 ENCOUNTER — TELEPHONE (OUTPATIENT)
Dept: OTHER | Age: 35
End: 2020-12-07

## 2020-12-07 NOTE — TELEPHONE ENCOUNTER
Call conducted with [address] [place] [address]  [address], Vicente Camacho. Received call from the patient. She states her sister and nephews with whom she lives have tested positive for [address]. She is requesting testing.  She states she has a sore throat for a few days

## 2020-12-08 ENCOUNTER — LAB ENCOUNTER (OUTPATIENT)
Dept: LAB | Facility: HOSPITAL | Age: 35
End: 2020-12-08
Attending: PHYSICIAN ASSISTANT
Payer: COMMERCIAL

## 2020-12-08 ENCOUNTER — TELEMEDICINE (OUTPATIENT)
Dept: FAMILY MEDICINE CLINIC | Facility: CLINIC | Age: 35
End: 2020-12-08

## 2020-12-08 DIAGNOSIS — Z20.822 CLOSE EXPOSURE TO 2019 NOVEL CORONAVIRUS: ICD-10-CM

## 2020-12-08 DIAGNOSIS — Z20.822 CLOSE EXPOSURE TO 2019 NOVEL CORONAVIRUS: Primary | ICD-10-CM

## 2020-12-08 PROCEDURE — 99213 OFFICE O/P EST LOW 20 MIN: CPT | Performed by: PHYSICIAN ASSISTANT

## 2020-12-08 NOTE — PROGRESS NOTES
HPI: HPI    I spoke to [employer] via video call, verified date of birth, and discussed current concerns. Onset/duration of current symptoms: yesterday. Common COVID-19 symptoms per CDC: CDC Clinical Guidance  • Fever:      Yes education: Not on file      Highest education level: Not on file    Occupational History      Not on file    Social Needs      Financial resource strain: Not on file      Food insecurity        Worry: Not on file        Inability: Not on file      Transpor rhinorrhea, sinus pressure and sore throat. Respiratory: Negative for cough, chest tightness, shortness of breath and wheezing. Cardiovascular: Negative for chest pain and palpitations. Gastrointestinal: Positive for nausea.  Negative for vomiting,

## 2020-12-14 ENCOUNTER — TELEPHONE (OUTPATIENT)
Dept: FAMILY MEDICINE CLINIC | Facility: CLINIC | Age: 35
End: 2020-12-14

## 2020-12-14 NOTE — TELEPHONE ENCOUNTER
Patient is requesting a return to work note. Patient tested negative for covid on 12/8 and has finished her self-quarantine. Patient is not currently experiencing any symptoms.  Patient is requesting that the note include that she does not need to be re ginette

## 2021-01-28 ENCOUNTER — NURSE TRIAGE (OUTPATIENT)
Dept: FAMILY MEDICINE CLINIC | Facility: CLINIC | Age: 36
End: 2021-01-28

## 2021-01-28 NOTE — TELEPHONE ENCOUNTER
Spoke with patient using  line [phone]    She would like to know if we can place an order for her to get an ultrasound for left sided pain.  Patient states she has been treated for this pain before but feels like the pain has actu

## 2021-01-28 NOTE — TELEPHONE ENCOUNTER
Action Requested: Summary for Provider     []  Critical Lab, Recommendations Needed  [] Need Additional Advice  []   FYI    []   Need Orders  [] Need Medications Sent to Pharmacy  []  Other     SUMMARY: through the language line  that was alread

## 2021-01-28 NOTE — TELEPHONE ENCOUNTER
With  Darshana [facility] YF#[id number], patient called back and made an appointment with Dr. Ramy Farrell.    Future Appointments   Date Time Provider Gil Walls   1/29/2021  1:20 PM [facility]   2/11/2021  1:15 PM Jeaentte Rodriguez

## 2021-01-29 ENCOUNTER — OFFICE VISIT (OUTPATIENT)
Dept: FAMILY MEDICINE CLINIC | Facility: CLINIC | Age: 36
End: 2021-01-29

## 2021-01-29 VITALS
HEIGHT: 64 IN | BODY MASS INDEX: 31.58 KG/M2 | DIASTOLIC BLOOD PRESSURE: 81 MMHG | WEIGHT: 185 LBS | HEART RATE: 73 BPM | SYSTOLIC BLOOD PRESSURE: 124 MMHG

## 2021-01-29 DIAGNOSIS — R10.12 LUQ ABDOMINAL PAIN: Primary | ICD-10-CM

## 2021-01-29 PROCEDURE — 3079F DIAST BP 80-89 MM HG: CPT | Performed by: FAMILY MEDICINE

## 2021-01-29 PROCEDURE — 99213 OFFICE O/P EST LOW 20 MIN: CPT | Performed by: FAMILY MEDICINE

## 2021-01-29 PROCEDURE — 3008F BODY MASS INDEX DOCD: CPT | Performed by: FAMILY MEDICINE

## 2021-01-29 PROCEDURE — 3074F SYST BP LT 130 MM HG: CPT | Performed by: FAMILY MEDICINE

## 2021-01-29 NOTE — PROGRESS NOTES
Blood pressure 124/81, pulse 73, height 5' 4\" (1.626 m), weight 185 lb (83.9 kg), last menstrual period 11/05/2020, not currently breastfeeding. Complaining of intermittent left upper quadrant abdominal pain.   Recently resolved COVID-19 infection in [place]

## 2021-02-01 ENCOUNTER — OFFICE VISIT (OUTPATIENT)
Dept: SURGERY | Facility: CLINIC | Age: 36
End: 2021-02-01

## 2021-02-01 VITALS — BODY MASS INDEX: 31.58 KG/M2 | WEIGHT: 185 LBS | HEIGHT: 64 IN

## 2021-02-01 DIAGNOSIS — R10.12 LUQ ABDOMINAL PAIN: Primary | ICD-10-CM

## 2021-02-01 PROCEDURE — 99244 OFF/OP CNSLTJ NEW/EST MOD 40: CPT | Performed by: SURGERY

## 2021-02-01 PROCEDURE — 3008F BODY MASS INDEX DOCD: CPT | Performed by: SURGERY

## 2021-02-01 NOTE — H&P
Chief complaint: Patient presents with:  Abdominal Pain: Referred by Dr. Kathe Caro for intermittent LUQ abdominal pain that has been present since 11/2020      HPI: Hulan Lanes is a delightful patient referred by Dr. Kathe Caro for intermittent denies shortness of breath with exertion  CARDIOVASCULAR: denies chest pain on exertion  GI: no hematemesis, no BRBPR, no worsening heartburn  : no dysuria, no blood in urine, no difficulty urinating  MUSCULOSKELETAL: no new musculoskeletal complaints  N

## 2021-02-03 ENCOUNTER — HOSPITAL ENCOUNTER (OUTPATIENT)
Dept: CT IMAGING | Age: 36
Discharge: HOME | End: 2021-02-03
Attending: SURGERY
Payer: COMMERCIAL

## 2021-02-03 DIAGNOSIS — R10.12 LUQ ABDOMINAL PAIN: ICD-10-CM

## 2021-02-03 PROCEDURE — 74176 CT ABD & PELVIS W/O CONTRAST: CPT | Performed by: SURGERY

## 2021-02-04 ENCOUNTER — TELEPHONE (OUTPATIENT)
Dept: FAMILY MEDICINE CLINIC | Facility: CLINIC | Age: 36
End: 2021-02-04

## 2021-02-04 NOTE — TELEPHONE ENCOUNTER
With AAKASH Padgett , [address], agent # [id number], verified name and  and reviewed CT results and recommendations per doctor's instructions. Pt states she does not have any abdominal discomfort and has bowel movements twice daily.  Pt states she is[place]

## 2021-02-04 NOTE — TELEPHONE ENCOUNTER
----- Message from Pao Alatorre DO sent at 2/4/2021  9:12 AM CST -----  Only constipation seen on ct. Patient to use magnesium citrate otc if discomfort persists.

## 2021-02-11 ENCOUNTER — HOSPITAL ENCOUNTER (OUTPATIENT)
Dept: GENERAL RADIOLOGY | Age: 36
Discharge: HOME | End: 2021-02-11
Attending: FAMILY MEDICINE
Payer: COMMERCIAL

## 2021-02-11 ENCOUNTER — LAB ENCOUNTER (OUTPATIENT)
Dept: LAB | Age: 36
End: 2021-02-11
Attending: FAMILY MEDICINE
Payer: COMMERCIAL

## 2021-02-11 ENCOUNTER — OFFICE VISIT (OUTPATIENT)
Dept: FAMILY MEDICINE CLINIC | Facility: CLINIC | Age: 36
End: 2021-02-11

## 2021-02-11 VITALS
DIASTOLIC BLOOD PRESSURE: 69 MMHG | HEART RATE: 57 BPM | RESPIRATION RATE: 16 BRPM | WEIGHT: 180.25 LBS | HEIGHT: 64 IN | TEMPERATURE: 98 F | SYSTOLIC BLOOD PRESSURE: 117 MMHG | BODY MASS INDEX: 30.77 KG/M2

## 2021-02-11 DIAGNOSIS — T83.9XXD COMPLICATION OF INTRAUTERINE DEVICE (IUD), UNSPECIFIED COMPLICATION, SUBSEQUENT ENCOUNTER: ICD-10-CM

## 2021-02-11 DIAGNOSIS — R10.12 LEFT UPPER QUADRANT ABDOMINAL PAIN: ICD-10-CM

## 2021-02-11 DIAGNOSIS — N63.20 BREAST MASS, LEFT: ICD-10-CM

## 2021-02-11 DIAGNOSIS — Z00.00 PHYSICAL EXAM: Primary | ICD-10-CM

## 2021-02-11 DIAGNOSIS — Z00.00 PHYSICAL EXAM: ICD-10-CM

## 2021-02-11 LAB
ALBUMIN SERPL-MCNC: 4.2 G/DL (ref 3.4–5)
ALBUMIN/GLOB SERPL: 1.1 {RATIO} (ref 1–2)
ALP LIVER SERPL-CCNC: 75 U/L
ALT SERPL-CCNC: 24 U/L
ANION GAP SERPL CALC-SCNC: 5 MMOL/L (ref 0–18)
AST SERPL-CCNC: 12 U/L (ref 15–37)
BACTERIA UR QL AUTO: NEGATIVE /HPF
BACTERIA UR QL AUTO: NEGATIVE /HPF
BASOPHILS # BLD AUTO: 0.03 X10(3) UL (ref 0–0.2)
BASOPHILS NFR BLD AUTO: 0.5 %
BILIRUB SERPL-MCNC: 0.5 MG/DL (ref 0.1–2)
BILIRUB UR QL: NEGATIVE
BUN BLD-MCNC: 10 MG/DL (ref 7–18)
BUN/CREAT SERPL: 14.1 (ref 10–20)
CALCIUM BLD-MCNC: 9.1 MG/DL (ref 8.5–10.1)
CANCER AG125 SERPL-ACNC: 9.5 U/ML (ref ?–35)
CHLORIDE SERPL-SCNC: 103 MMOL/L (ref 98–112)
CHOLEST SMN-MCNC: 213 MG/DL (ref ?–200)
CO2 SERPL-SCNC: 28 MMOL/L (ref 21–32)
COLOR UR: YELLOW
CREAT BLD-MCNC: 0.71 MG/DL
DEPRECATED RDW RBC AUTO: 38.6 FL (ref 35.1–46.3)
EOSINOPHIL # BLD AUTO: 0.1 X10(3) UL (ref 0–0.7)
EOSINOPHIL NFR BLD AUTO: 1.5 %
ERYTHROCYTE [DISTWIDTH] IN BLOOD BY AUTOMATED COUNT: 11.8 % (ref 11–15)
EST. AVERAGE GLUCOSE BLD GHB EST-MCNC: 105 MG/DL (ref 68–126)
GLOBULIN PLAS-MCNC: 3.8 G/DL (ref 2.8–4.4)
GLUCOSE BLD-MCNC: 77 MG/DL (ref 70–99)
GLUCOSE UR-MCNC: NEGATIVE MG/DL
HBA1C MFR BLD HPLC: 5.3 % (ref ?–5.7)
HCT VFR BLD AUTO: 39.7 %
HDLC SERPL-MCNC: 45 MG/DL (ref 40–59)
HGB BLD-MCNC: 13.5 G/DL
HGB UR QL STRIP.AUTO: NEGATIVE
IMM GRANULOCYTES # BLD AUTO: 0.01 X10(3) UL (ref 0–1)
IMM GRANULOCYTES NFR BLD: 0.2 %
KETONES UR-MCNC: 20 MG/DL
LDLC SERPL CALC-MCNC: 152 MG/DL (ref ?–100)
LEUKOCYTE ESTERASE UR QL STRIP.AUTO: NEGATIVE
LYMPHOCYTES # BLD AUTO: 2.35 X10(3) UL (ref 1–4)
LYMPHOCYTES NFR BLD AUTO: 35.8 %
M PROTEIN MFR SERPL ELPH: 8 G/DL (ref 6.4–8.2)
MCH RBC QN AUTO: 30.6 PG (ref 26–34)
MCHC RBC AUTO-ENTMCNC: 34 G/DL (ref 31–37)
MCV RBC AUTO: 90 FL
MONOCYTES # BLD AUTO: 0.36 X10(3) UL (ref 0.1–1)
MONOCYTES NFR BLD AUTO: 5.5 %
NEUTROPHILS # BLD AUTO: 3.71 X10 (3) UL (ref 1.5–7.7)
NEUTROPHILS # BLD AUTO: 3.71 X10(3) UL (ref 1.5–7.7)
NEUTROPHILS NFR BLD AUTO: 56.5 %
NITRITE UR QL STRIP.AUTO: NEGATIVE
NONHDLC SERPL-MCNC: 168 MG/DL (ref ?–130)
OSMOLALITY SERPL CALC.SUM OF ELEC: 280 MOSM/KG (ref 275–295)
PATIENT FASTING Y/N/NP: YES
PATIENT FASTING Y/N/NP: YES
PH UR: 6 [PH] (ref 5–8)
PLATELET # BLD AUTO: 267 10(3)UL (ref 150–450)
POTASSIUM SERPL-SCNC: 3.5 MMOL/L (ref 3.5–5.1)
PROT UR-MCNC: NEGATIVE MG/DL
RBC # BLD AUTO: 4.41 X10(6)UL
RBC #/AREA URNS AUTO: <1 /HPF
RBC #/AREA URNS AUTO: <1 /HPF
SODIUM SERPL-SCNC: 136 MMOL/L (ref 136–145)
SP GR UR STRIP: 1.02 (ref 1–1.03)
TRIGL SERPL-MCNC: 79 MG/DL (ref 30–149)
TSI SER-ACNC: 1.15 MIU/ML (ref 0.36–3.74)
UROBILINOGEN UR STRIP-ACNC: <2
VLDLC SERPL CALC-MCNC: 16 MG/DL (ref 0–30)
WBC # BLD AUTO: 6.6 X10(3) UL (ref 4–11)
WBC #/AREA URNS AUTO: 1 /HPF
WBC #/AREA URNS AUTO: <1 /HPF

## 2021-02-11 PROCEDURE — 84443 ASSAY THYROID STIM HORMONE: CPT

## 2021-02-11 PROCEDURE — 81015 MICROSCOPIC EXAM OF URINE: CPT | Performed by: FAMILY MEDICINE

## 2021-02-11 PROCEDURE — 99395 PREV VISIT EST AGE 18-39: CPT | Performed by: FAMILY MEDICINE

## 2021-02-11 PROCEDURE — 3074F SYST BP LT 130 MM HG: CPT | Performed by: FAMILY MEDICINE

## 2021-02-11 PROCEDURE — 86304 IMMUNOASSAY TUMOR CA 125: CPT

## 2021-02-11 PROCEDURE — 80053 COMPREHEN METABOLIC PANEL: CPT

## 2021-02-11 PROCEDURE — 3078F DIAST BP <80 MM HG: CPT | Performed by: FAMILY MEDICINE

## 2021-02-11 PROCEDURE — 82306 VITAMIN D 25 HYDROXY: CPT | Performed by: FAMILY MEDICINE

## 2021-02-11 PROCEDURE — 81001 URINALYSIS AUTO W/SCOPE: CPT | Performed by: FAMILY MEDICINE

## 2021-02-11 PROCEDURE — 3008F BODY MASS INDEX DOCD: CPT | Performed by: FAMILY MEDICINE

## 2021-02-11 PROCEDURE — 85025 COMPLETE CBC W/AUTO DIFF WBC: CPT

## 2021-02-11 PROCEDURE — 83036 HEMOGLOBIN GLYCOSYLATED A1C: CPT

## 2021-02-11 PROCEDURE — 36415 COLL VENOUS BLD VENIPUNCTURE: CPT

## 2021-02-11 PROCEDURE — 74018 RADEX ABDOMEN 1 VIEW: CPT | Performed by: FAMILY MEDICINE

## 2021-02-11 PROCEDURE — 80061 LIPID PANEL: CPT

## 2021-02-11 RX ORDER — MULTIVITAMIN
TABLET ORAL DAILY
COMMUNITY

## 2021-02-11 NOTE — PROGRESS NOTES
2/11/2021  1:32 PM    Ashley Martin is a 28year old female. Chief complaint(s): Patient presents with:  Routine Physical  Gyn Exam    HPI:     Ashley Martin primary complaint is regarding CPE.      Levonne Mitts mouth daily. • Omeprazole (PRILOSEC OR) Take by mouth as needed.          Allergies:    Amoxicillin             RASH  Ibuprofen               SHORTNESS OF BREATH      ROS:   Review of Systems   Constitutional: Negative for appetite change, fatigue and f exhibits no mass, no nipple discharge and no skin change. Left breast exhibits no mass, no nipple discharge and no skin change. Lungs clear to auscultation bilaterally. Abdominal: Soft.    Genitourinary:    Genitourinary Comments: GYN pelvic exam: Shakira Adam ADRENALS: Normal. KIDNEYS: Normal. AORTA/VASCULAR: Normal.  No aneurysm or dissection. LYMPHADENOPATHY: None. GI/MESENTERY: Normal appendix. Large amount of stool in the colon. No obstruction, bowel wall thickening, or mesenteric mass.  ABDOMINAL WALL: N high-carbohydrate foods); athletic conditioning, fluids; low fat milk, limit to less than 20 oz. a day; dental care with her dentist), and healthy habits & social competence & responsibilities: Recommendations on physical activity; exercise daily or at [place] [address]

## 2021-02-12 LAB
25(OH)D3 SERPL-MCNC: 29.3 NG/ML (ref 30–100)
C TRACH DNA SPEC QL NAA+PROBE: NEGATIVE
N GONORRHOEA DNA SPEC QL NAA+PROBE: NEGATIVE

## 2021-02-12 RX ORDER — ERGOCALCIFEROL 1.25 MG/1
50000 CAPSULE ORAL WEEKLY
Qty: 12 CAPSULE | Refills: 4 | Status: SHIPPED | OUTPATIENT
Start: 2021-02-12 | End: 2021-03-14

## 2021-02-15 ENCOUNTER — TELEPHONE (OUTPATIENT)
Dept: FAMILY MEDICINE CLINIC | Facility: CLINIC | Age: 36
End: 2021-02-15

## 2021-02-15 NOTE — TELEPHONE ENCOUNTER
----- Message from Rajendra Warren MD sent at 2/13/2021  1:18 PM CST -----  Please call patient, abd x ray + visualization of IUD in uterus

## 2021-02-16 ENCOUNTER — OFFICE VISIT (OUTPATIENT)
Dept: GASTROENTEROLOGY | Facility: CLINIC | Age: 36
End: 2021-02-16

## 2021-02-16 ENCOUNTER — TELEPHONE (OUTPATIENT)
Dept: GASTROENTEROLOGY | Facility: CLINIC | Age: 36
End: 2021-02-16

## 2021-02-16 VITALS
HEIGHT: 64 IN | HEART RATE: 77 BPM | WEIGHT: 185 LBS | DIASTOLIC BLOOD PRESSURE: 65 MMHG | SYSTOLIC BLOOD PRESSURE: 104 MMHG | TEMPERATURE: 99 F | BODY MASS INDEX: 31.58 KG/M2

## 2021-02-16 DIAGNOSIS — R10.12 LUQ PAIN: ICD-10-CM

## 2021-02-16 DIAGNOSIS — R11.0 NAUSEA: ICD-10-CM

## 2021-02-16 DIAGNOSIS — R10.13 EPIGASTRIC PAIN: ICD-10-CM

## 2021-02-16 DIAGNOSIS — K59.00 CONSTIPATION, UNSPECIFIED CONSTIPATION TYPE: ICD-10-CM

## 2021-02-16 DIAGNOSIS — K21.9 GASTROESOPHAGEAL REFLUX DISEASE, UNSPECIFIED WHETHER ESOPHAGITIS PRESENT: Primary | ICD-10-CM

## 2021-02-16 LAB — HPV I/H RISK 1 DNA SPEC QL NAA+PROBE: NEGATIVE

## 2021-02-16 PROCEDURE — 3008F BODY MASS INDEX DOCD: CPT | Performed by: NURSE PRACTITIONER

## 2021-02-16 PROCEDURE — 99244 OFF/OP CNSLTJ NEW/EST MOD 40: CPT | Performed by: NURSE PRACTITIONER

## 2021-02-16 PROCEDURE — 3074F SYST BP LT 130 MM HG: CPT | Performed by: NURSE PRACTITIONER

## 2021-02-16 PROCEDURE — 3078F DIAST BP <80 MM HG: CPT | Performed by: NURSE PRACTITIONER

## 2021-02-16 NOTE — PATIENT INSTRUCTIONS
-increase omeprazole to 40 mg/daily  -reflux diet modification  -start miralax once daily    -egd w/ amita w/ dr. Lo Meter  Dx: gerd, epigastric pain, luq pain, nausea     Tips to Control Acid Reflux    To control acid reflux, you’ll need to make some basic die

## 2021-02-16 NOTE — H&P
[facility], [facility] - Gastroenterology                                                                                                               Reason for consult: LUQ pain    Requesting physician or provider: Betzy Kearney.  DO Kendall    Patient presents wit no  Last EGD: no    Wt Readings from Last 6 Encounters:  02/16/21 : 185 lb (83.9 kg)  02/11/21 : 180 lb 4 oz (81.8 kg)  02/01/21 : 185 lb (83.9 kg)  01/29/21 : 185 lb (83.9 kg)  11/19/20 : 189 lb (85.7 kg)  01/21/20 : 189 lb (85.7 kg)       History, Medica temperature 98.6 °F (37 °C), temperature source Oral, height 5' 4\" (1.626 m), weight 185 lb (83.9 kg), last menstrual period 02/02/2021, not currently breastfeeding.     GEN: WD/WN, NAD  HEENT: Supple symmetrical, trachea midline  CV: RRR, the extremities Time: 02/11/21  2:33 PM   Result Value Ref Range    Glucose 77 70 - 99 mg/dL    Sodium 136 136 - 145 mmol/L    Potassium 3.5 3.5 - 5.1 mmol/L    Chloride 103 98 - 112 mmol/L    CO2 28.0 21.0 - 32.0 mmol/L    Anion Gap 5 0 - 18 mmol/L    BUN 10 7 - 18 mg/dL omeprazole to 40 mg/daily  -reflux diet modification  -start miralax once daily    -egd w/ mac w/ dr. Isidro Holcomb  Dx: gerd, epigastric pain, luq pain, nausea     Orders This Visit:  No orders of the defined types were placed in this encounter.       Meds This Vi

## 2021-02-24 ENCOUNTER — HOSPITAL ENCOUNTER (OUTPATIENT)
Dept: MAMMOGRAPHY | Age: 36
Discharge: HOME | End: 2021-02-24
Attending: FAMILY MEDICINE
Payer: COMMERCIAL

## 2021-02-24 DIAGNOSIS — Z00.00 PHYSICAL EXAM: ICD-10-CM

## 2021-02-24 PROCEDURE — 77063 BREAST TOMOSYNTHESIS BI: CPT | Performed by: FAMILY MEDICINE

## 2021-02-24 PROCEDURE — 77067 SCR MAMMO BI INCL CAD: CPT | Performed by: FAMILY MEDICINE

## 2021-03-09 ENCOUNTER — SURGERY CENTER DOCUMENTATION (OUTPATIENT)
Dept: SURGERY | Age: 36
End: 2021-03-09

## 2021-03-09 ENCOUNTER — LAB REQUISITION (OUTPATIENT)
Dept: LAB | Facility: HOSPITAL | Age: 36
End: 2021-03-09
Payer: COMMERCIAL

## 2021-03-09 DIAGNOSIS — R10.12 LEFT UPPER QUADRANT PAIN: ICD-10-CM

## 2021-03-09 DIAGNOSIS — K59.00 CONSTIPATION, UNSPECIFIED: ICD-10-CM

## 2021-03-09 DIAGNOSIS — K21.9 GASTRO-ESOPHAGEAL REFLUX DISEASE WITHOUT ESOPHAGITIS: ICD-10-CM

## 2021-03-09 DIAGNOSIS — R10.13 EPIGASTRIC PAIN: ICD-10-CM

## 2021-03-09 DIAGNOSIS — R11.0 NAUSEA: ICD-10-CM

## 2021-03-09 PROCEDURE — 88312 SPECIAL STAINS GROUP 1: CPT | Performed by: INTERNAL MEDICINE

## 2021-03-09 PROCEDURE — 88305 TISSUE EXAM BY PATHOLOGIST: CPT | Performed by: INTERNAL MEDICINE

## 2021-03-09 PROCEDURE — 43239 EGD BIOPSY SINGLE/MULTIPLE: CPT | Performed by: INTERNAL MEDICINE

## 2021-03-09 NOTE — PROCEDURES
ESOPHAGOGASTRODUODENOSCOPY (EGD) REPORT    [address]     5/3/1985 Age 28year old   PCP Anitha Gary MD       Date of procedure: 21    Procedure: EGD w/ biopsies    Pre-operative diagnosis: L musculoskeletal    Recommend:  1. Await pathology  2. Reflux precautions  3.  Take omeprazole daily before breakfast x 2 weeks      >>>If tissue was sampled/removed and you have not received your pathology results either by phone or letter within 2 weeks, p

## 2021-04-15 ENCOUNTER — OFFICE VISIT (OUTPATIENT)
Dept: FAMILY MEDICINE CLINIC | Facility: CLINIC | Age: 36
End: 2021-04-15
Payer: COMMERCIAL

## 2021-04-15 VITALS
HEIGHT: 64 IN | DIASTOLIC BLOOD PRESSURE: 76 MMHG | WEIGHT: 177.13 LBS | BODY MASS INDEX: 30.24 KG/M2 | HEART RATE: 69 BPM | SYSTOLIC BLOOD PRESSURE: 110 MMHG

## 2021-04-15 DIAGNOSIS — K29.50 CHRONIC GASTRITIS WITHOUT BLEEDING, UNSPECIFIED GASTRITIS TYPE: Primary | ICD-10-CM

## 2021-04-15 PROCEDURE — 3078F DIAST BP <80 MM HG: CPT | Performed by: FAMILY MEDICINE

## 2021-04-15 PROCEDURE — 3074F SYST BP LT 130 MM HG: CPT | Performed by: FAMILY MEDICINE

## 2021-04-15 PROCEDURE — 99213 OFFICE O/P EST LOW 20 MIN: CPT | Performed by: FAMILY MEDICINE

## 2021-04-15 PROCEDURE — 3008F BODY MASS INDEX DOCD: CPT | Performed by: FAMILY MEDICINE

## 2021-04-15 RX ORDER — OMEPRAZOLE 40 MG/1
40 CAPSULE, DELAYED RELEASE ORAL DAILY
Qty: 90 CAPSULE | Refills: 3 | Status: SHIPPED | OUTPATIENT
Start: 2021-04-15 | End: 2022-04-10

## 2021-04-15 NOTE — PROGRESS NOTES
4/15/2021  2:13 PM    Jordan Villa is a 28year old female.     Chief complaint(s): Patient presents with:  Test Results: X Ray of the Abdomen on 02/11,  Biopsy (EGD)from 03/09      HPI:     Jordan Villa primary complai Multiple Vitamin (MULTI-VITAMIN DAILY) Oral Tab Take by mouth daily. • Omeprazole (PRILOSEC OR) Take by mouth as needed.          Allergies:    Amoxicillin             RASH  Ibuprofen               SHORTNESS OF BREATH      ROS:   Review of Systems   Con Specimen:    Gastric biopsy, Bx gastric r/o H. Pylori                                                   Final Diagnosis:         Gastric biopsy:   Mild-moderate chronic gastritis.   Diff-Quik stain (appropriate control reactivity) Referrals:  None         Gumaro Morales MD

## 2021-07-21 ENCOUNTER — OFFICE VISIT (OUTPATIENT)
Dept: FAMILY MEDICINE CLINIC | Facility: CLINIC | Age: 36
End: 2021-07-21

## 2021-07-21 VITALS
BODY MASS INDEX: 30.39 KG/M2 | HEIGHT: 64 IN | WEIGHT: 178 LBS | DIASTOLIC BLOOD PRESSURE: 73 MMHG | HEART RATE: 57 BPM | SYSTOLIC BLOOD PRESSURE: 111 MMHG

## 2021-07-21 DIAGNOSIS — N89.8 VAGINAL DISCHARGE: ICD-10-CM

## 2021-07-21 DIAGNOSIS — N76.4 LEFT GENITAL LABIAL ABSCESS: Primary | ICD-10-CM

## 2021-07-21 DIAGNOSIS — R30.0 DYSURIA: ICD-10-CM

## 2021-07-21 LAB
APPEARANCE: CLEAR
BILIRUBIN: NEGATIVE
GLUCOSE (URINE DIPSTICK): NEGATIVE MG/DL
MULTISTIX LOT#: 5077 NUMERIC
NITRITE, URINE: NEGATIVE
PH, URINE: 7 (ref 4.5–8)
PROTEIN (URINE DIPSTICK): NEGATIVE MG/DL
SPECIFIC GRAVITY: 1.01 (ref 1–1.03)
URINE-COLOR: YELLOW
UROBILINOGEN,SEMI-QN: 0.2 MG/DL (ref 0–1.9)

## 2021-07-21 PROCEDURE — 3074F SYST BP LT 130 MM HG: CPT | Performed by: PHYSICIAN ASSISTANT

## 2021-07-21 PROCEDURE — 3078F DIAST BP <80 MM HG: CPT | Performed by: PHYSICIAN ASSISTANT

## 2021-07-21 PROCEDURE — 99213 OFFICE O/P EST LOW 20 MIN: CPT | Performed by: PHYSICIAN ASSISTANT

## 2021-07-21 PROCEDURE — 81002 URINALYSIS NONAUTO W/O SCOPE: CPT | Performed by: PHYSICIAN ASSISTANT

## 2021-07-21 PROCEDURE — 3008F BODY MASS INDEX DOCD: CPT | Performed by: PHYSICIAN ASSISTANT

## 2021-07-21 RX ORDER — SULFAMETHOXAZOLE AND TRIMETHOPRIM 800; 160 MG/1; MG/1
1 TABLET ORAL 2 TIMES DAILY
Qty: 14 TABLET | Refills: 0 | Status: SHIPPED | OUTPATIENT
Start: 2021-07-21 | End: 2021-07-28

## 2021-07-21 NOTE — PROGRESS NOTES
HPI:     Vaginal Discharge  The patient's primary symptoms include genital lesions and vaginal discharge. The patient's pertinent negatives include no genital itching, genital odor, genital rash, pelvic pain or vaginal bleeding. Chronicity: 4 days.  Episode Socioeconomic History      Marital status:       Spouse name: Not on file      Number of children: Not on file      Years of education: Not on file      Highest education level: Not on file    Occupational History      Not on file    Tobacco Use Constitutional: Negative for activity change, chills, fatigue and fever. HENT: Negative for congestion, ear discharge, ear pain, postnasal drip, rhinorrhea, sinus pressure, sinus pain and sore throat.     Respiratory: Negative for cough, chest tightness tenderness. Genitourinary:     Pubic Area: No rash. Labia:         Right: No rash, tenderness or lesion. Left: Lesion present. No rash or tenderness. Vagina: No vaginal discharge, tenderness or bleeding.       Cervix: No cervical motio

## 2021-07-23 ENCOUNTER — TELEPHONE (OUTPATIENT)
Dept: FAMILY MEDICINE CLINIC | Facility: CLINIC | Age: 36
End: 2021-07-23

## 2021-07-23 LAB
GENITAL VAGINOSIS SCREEN: NEGATIVE
TRICHOMONAS SCREEN: NEGATIVE

## 2021-07-23 NOTE — TELEPHONE ENCOUNTER
Pt would like a call back with her vaginal test results. Patient, is requesting a return call in [address] [facility]

## 2021-07-24 NOTE — TELEPHONE ENCOUNTER
Renae Mendoza, [facility]   2021  8:06 AM CDT Back to Top      Spoke w/patient ( & Name verified) and informed of normal results. Patient verbalized understanding.      Nasreen Wilkinson PA-C   2021 10:57 PM CDT       Genital culture, Urine culture a

## 2021-08-16 ENCOUNTER — TELEPHONE (OUTPATIENT)
Dept: GASTROENTEROLOGY | Facility: CLINIC | Age: 36
End: 2021-08-16

## 2021-08-16 NOTE — TELEPHONE ENCOUNTER
Contacted patient to review MD message. Verified . Patient understands explanation of results and verbalized is feeling well with no follow up needed at this time. TE closed.

## 2021-08-16 NOTE — TELEPHONE ENCOUNTER
----- Message from Massiel Duffy MD sent at 8/15/2021  1:57 PM CDT -----  Imaging and EGD negative for etiology for pain  Follow up with Harshal Hughes as needed

## 2021-08-25 ENCOUNTER — LAB ENCOUNTER (OUTPATIENT)
Dept: LAB | Age: 36
End: 2021-08-25
Attending: NURSE PRACTITIONER
Payer: COMMERCIAL

## 2021-08-25 ENCOUNTER — TELEMEDICINE (OUTPATIENT)
Dept: FAMILY MEDICINE CLINIC | Facility: CLINIC | Age: 36
End: 2021-08-25

## 2021-08-25 DIAGNOSIS — J02.9 SORE THROAT: ICD-10-CM

## 2021-08-25 DIAGNOSIS — Z20.822 ENCOUNTER BY TELEHEALTH FOR SUSPECTED COVID-19: ICD-10-CM

## 2021-08-25 DIAGNOSIS — Z20.822 ENCOUNTER BY TELEHEALTH FOR SUSPECTED COVID-19: Primary | ICD-10-CM

## 2021-08-25 PROCEDURE — 87081 CULTURE SCREEN ONLY: CPT

## 2021-08-25 PROCEDURE — 99214 OFFICE O/P EST MOD 30 MIN: CPT | Performed by: NURSE PRACTITIONER

## 2021-08-25 NOTE — PROGRESS NOTES
HPI    Virtual Telephone/Video Check-In    Jurline Large verbally consents to a Virtual/Telephone Check-In visit on 08/25/21.   Patient has been referred to the [facility] website at [email or web address] to review the yearly Consent to Treat file    Occupational History      Not on file    Tobacco Use      Smoking status: Never Smoker      Smokeless tobacco: Never Used    Vaping Use      Vaping Use: Never used    Substance and Sexual Activity      Alcohol use: Yes        Comment: rarely      D capsule 3   • Multiple Vitamin (MULTI-VITAMIN DAILY) Oral Tab Take by mouth daily. Allergies:    Amoxicillin             RASH  Ibuprofen               SHORTNESS OF BREATH    Physical Exam  Constitutional:       Appearance: Normal appearance.    YOSEF

## 2021-08-26 LAB — SARS-COV-2 RNA RESP QL NAA+PROBE: NOT DETECTED

## 2021-08-27 ENCOUNTER — TELEPHONE (OUTPATIENT)
Dept: FAMILY MEDICINE CLINIC | Facility: CLINIC | Age: 36
End: 2021-08-27

## 2021-08-27 NOTE — TELEPHONE ENCOUNTER
Spoke to patient. So far covid test is negative and awaiting strep cx results. Patient informed we will call as soon as received. Patient verbalized understanding.

## 2022-02-28 ENCOUNTER — OFFICE VISIT (OUTPATIENT)
Dept: FAMILY MEDICINE CLINIC | Facility: CLINIC | Age: 37
End: 2022-02-28
Payer: COMMERCIAL

## 2022-02-28 ENCOUNTER — HOSPITAL ENCOUNTER (OUTPATIENT)
Dept: GENERAL RADIOLOGY | Age: 37
Discharge: HOME | End: 2022-02-28
Attending: NURSE PRACTITIONER
Payer: COMMERCIAL

## 2022-02-28 VITALS
WEIGHT: 189 LBS | DIASTOLIC BLOOD PRESSURE: 68 MMHG | BODY MASS INDEX: 32.27 KG/M2 | HEIGHT: 64 IN | SYSTOLIC BLOOD PRESSURE: 125 MMHG | HEART RATE: 73 BPM

## 2022-02-28 DIAGNOSIS — R20.2 NUMBNESS AND TINGLING IN RIGHT HAND: ICD-10-CM

## 2022-02-28 DIAGNOSIS — R20.0 NUMBNESS AND TINGLING IN RIGHT HAND: ICD-10-CM

## 2022-02-28 DIAGNOSIS — M79.601 RIGHT ARM PAIN: ICD-10-CM

## 2022-02-28 DIAGNOSIS — M25.511 CHRONIC RIGHT SHOULDER PAIN: ICD-10-CM

## 2022-02-28 DIAGNOSIS — G89.29 CHRONIC RIGHT SHOULDER PAIN: Primary | ICD-10-CM

## 2022-02-28 DIAGNOSIS — G89.29 CHRONIC RIGHT SHOULDER PAIN: ICD-10-CM

## 2022-02-28 DIAGNOSIS — M25.511 CHRONIC RIGHT SHOULDER PAIN: Primary | ICD-10-CM

## 2022-02-28 PROCEDURE — 3078F DIAST BP <80 MM HG: CPT | Performed by: NURSE PRACTITIONER

## 2022-02-28 PROCEDURE — 3074F SYST BP LT 130 MM HG: CPT | Performed by: NURSE PRACTITIONER

## 2022-02-28 PROCEDURE — 3008F BODY MASS INDEX DOCD: CPT | Performed by: NURSE PRACTITIONER

## 2022-02-28 PROCEDURE — 99214 OFFICE O/P EST MOD 30 MIN: CPT | Performed by: NURSE PRACTITIONER

## 2022-02-28 PROCEDURE — 73030 X-RAY EXAM OF SHOULDER: CPT | Performed by: NURSE PRACTITIONER

## 2022-03-01 ENCOUNTER — TELEPHONE (OUTPATIENT)
Dept: FAMILY MEDICINE CLINIC | Facility: CLINIC | Age: 37
End: 2022-03-01

## 2022-03-18 ENCOUNTER — OFFICE VISIT (OUTPATIENT)
Dept: FAMILY MEDICINE CLINIC | Facility: CLINIC | Age: 37
End: 2022-03-18
Payer: COMMERCIAL

## 2022-03-18 VITALS
HEIGHT: 64 IN | BODY MASS INDEX: 31.52 KG/M2 | SYSTOLIC BLOOD PRESSURE: 112 MMHG | WEIGHT: 184.63 LBS | HEART RATE: 80 BPM | DIASTOLIC BLOOD PRESSURE: 79 MMHG

## 2022-03-18 DIAGNOSIS — Z80.3 FAMILY HISTORY OF BREAST CANCER: ICD-10-CM

## 2022-03-18 DIAGNOSIS — Z00.00 PHYSICAL EXAM: Primary | ICD-10-CM

## 2022-03-18 DIAGNOSIS — N39.46 MIXED STRESS AND URGE URINARY INCONTINENCE: ICD-10-CM

## 2022-03-18 PROCEDURE — 3074F SYST BP LT 130 MM HG: CPT | Performed by: FAMILY MEDICINE

## 2022-03-18 PROCEDURE — 3078F DIAST BP <80 MM HG: CPT | Performed by: FAMILY MEDICINE

## 2022-03-18 PROCEDURE — 99395 PREV VISIT EST AGE 18-39: CPT | Performed by: FAMILY MEDICINE

## 2022-03-18 PROCEDURE — 3008F BODY MASS INDEX DOCD: CPT | Performed by: FAMILY MEDICINE

## 2022-03-29 ENCOUNTER — LAB ENCOUNTER (OUTPATIENT)
Dept: LAB | Age: 37
End: 2022-03-29
Attending: FAMILY MEDICINE
Payer: COMMERCIAL

## 2022-03-29 ENCOUNTER — OFFICE VISIT (OUTPATIENT)
Dept: FAMILY MEDICINE CLINIC | Facility: CLINIC | Age: 37
End: 2022-03-29
Payer: COMMERCIAL

## 2022-03-29 VITALS
WEIGHT: 186 LBS | SYSTOLIC BLOOD PRESSURE: 128 MMHG | DIASTOLIC BLOOD PRESSURE: 78 MMHG | HEART RATE: 76 BPM | BODY MASS INDEX: 32 KG/M2

## 2022-03-29 DIAGNOSIS — Z30.432 ENCOUNTER FOR IUD REMOVAL: Primary | ICD-10-CM

## 2022-03-29 DIAGNOSIS — Z00.00 PHYSICAL EXAM: ICD-10-CM

## 2022-03-29 LAB
ALBUMIN SERPL-MCNC: 4 G/DL (ref 3.4–5)
ALBUMIN/GLOB SERPL: 1.2 {RATIO} (ref 1–2)
ALP LIVER SERPL-CCNC: 71 U/L
ALT SERPL-CCNC: 22 U/L
ANION GAP SERPL CALC-SCNC: 8 MMOL/L (ref 0–18)
AST SERPL-CCNC: 18 U/L (ref 15–37)
BASOPHILS # BLD AUTO: 0.03 X10(3) UL (ref 0–0.2)
BASOPHILS NFR BLD AUTO: 0.5 %
BILIRUB SERPL-MCNC: 0.6 MG/DL (ref 0.1–2)
BUN BLD-MCNC: 8 MG/DL (ref 7–18)
BUN/CREAT SERPL: 11.6 (ref 10–20)
CALCIUM BLD-MCNC: 8.9 MG/DL (ref 8.5–10.1)
CHLORIDE SERPL-SCNC: 104 MMOL/L (ref 98–112)
CO2 SERPL-SCNC: 25 MMOL/L (ref 21–32)
COLOR UR: YELLOW
CONTROL LINE PRESENT WITH A CLEAR BACKGROUND (YES/NO): PRESENT YES/NO
CREAT BLD-MCNC: 0.69 MG/DL
DEPRECATED RDW RBC AUTO: 39.5 FL (ref 35.1–46.3)
EOSINOPHIL # BLD AUTO: 0.06 X10(3) UL (ref 0–0.7)
EOSINOPHIL NFR BLD AUTO: 1 %
ERYTHROCYTE [DISTWIDTH] IN BLOOD BY AUTOMATED COUNT: 11.9 % (ref 11–15)
FASTING PATIENT LIPID ANSWER: YES
FASTING STATUS PATIENT QL REPORTED: YES
GLOBULIN PLAS-MCNC: 3.4 G/DL (ref 2.8–4.4)
GLUCOSE BLD-MCNC: 87 MG/DL (ref 70–99)
GLUCOSE UR-MCNC: NEGATIVE MG/DL
HBA1C MFR BLD: 5.5 % (ref ?–5.7)
HCT VFR BLD AUTO: 37.5 %
HDLC SERPL-MCNC: 43 MG/DL (ref 40–59)
HGB BLD-MCNC: 12.8 G/DL
IMM GRANULOCYTES # BLD AUTO: 0.02 X10(3) UL (ref 0–1)
IMM GRANULOCYTES NFR BLD: 0.3 %
KIT LOT #: NORMAL NUMERIC
LDLC SERPL CALC-MCNC: 124 MG/DL (ref ?–100)
LYMPHOCYTES # BLD AUTO: 2.06 X10(3) UL (ref 1–4)
LYMPHOCYTES NFR BLD AUTO: 33.1 %
MCH RBC QN AUTO: 30.9 PG (ref 26–34)
MCHC RBC AUTO-ENTMCNC: 34.1 G/DL (ref 31–37)
MCV RBC AUTO: 90.6 FL
MONOCYTES # BLD AUTO: 0.39 X10(3) UL (ref 0.1–1)
MONOCYTES NFR BLD AUTO: 6.3 %
NEUTROPHILS # BLD AUTO: 3.67 X10 (3) UL (ref 1.5–7.7)
NEUTROPHILS # BLD AUTO: 3.67 X10(3) UL (ref 1.5–7.7)
NEUTROPHILS NFR BLD AUTO: 58.8 %
NITRITE UR QL STRIP.AUTO: NEGATIVE
NONHDLC SERPL-MCNC: 138 MG/DL (ref ?–130)
OSMOLALITY SERPL CALC.SUM OF ELEC: 282 MOSM/KG (ref 275–295)
PH UR: 6 [PH] (ref 5–8)
PLATELET # BLD AUTO: 276 10(3)UL (ref 150–450)
POTASSIUM SERPL-SCNC: 3.8 MMOL/L (ref 3.5–5.1)
PREGNANCY TEST, URINE: NEGATIVE
PROT SERPL-MCNC: 7.4 G/DL (ref 6.4–8.2)
PROT UR-MCNC: NEGATIVE MG/DL
RBC # BLD AUTO: 4.14 X10(6)UL
SODIUM SERPL-SCNC: 137 MMOL/L (ref 136–145)
SP GR UR STRIP: 1.01 (ref 1–1.03)
TRIGL SERPL-MCNC: 76 MG/DL (ref 30–149)
TSI SER-ACNC: 0.42 MIU/ML (ref 0.36–3.74)
VIT C UR-MCNC: NEGATIVE MG/DL
VIT D+METAB SERPL-MCNC: 15.3 NG/ML (ref 30–100)
VLDLC SERPL CALC-MCNC: 13 MG/DL (ref 0–30)
WBC # BLD AUTO: 6.2 X10(3) UL (ref 4–11)

## 2022-03-29 PROCEDURE — 81001 URINALYSIS AUTO W/SCOPE: CPT

## 2022-03-29 PROCEDURE — 85025 COMPLETE CBC W/AUTO DIFF WBC: CPT

## 2022-03-29 PROCEDURE — 87086 URINE CULTURE/COLONY COUNT: CPT

## 2022-03-29 PROCEDURE — 84443 ASSAY THYROID STIM HORMONE: CPT

## 2022-03-29 PROCEDURE — 81025 URINE PREGNANCY TEST: CPT | Performed by: FAMILY MEDICINE

## 2022-03-29 PROCEDURE — 80053 COMPREHEN METABOLIC PANEL: CPT

## 2022-03-29 PROCEDURE — 36415 COLL VENOUS BLD VENIPUNCTURE: CPT

## 2022-03-29 PROCEDURE — 80061 LIPID PANEL: CPT

## 2022-03-29 PROCEDURE — 83036 HEMOGLOBIN GLYCOSYLATED A1C: CPT

## 2022-03-29 PROCEDURE — 99213 OFFICE O/P EST LOW 20 MIN: CPT | Performed by: FAMILY MEDICINE

## 2022-03-29 PROCEDURE — 3074F SYST BP LT 130 MM HG: CPT | Performed by: FAMILY MEDICINE

## 2022-03-29 PROCEDURE — 82306 VITAMIN D 25 HYDROXY: CPT

## 2022-03-29 PROCEDURE — 3078F DIAST BP <80 MM HG: CPT | Performed by: FAMILY MEDICINE

## 2022-03-29 RX ORDER — ERGOCALCIFEROL 1.25 MG/1
50000 CAPSULE ORAL WEEKLY
Qty: 12 CAPSULE | Refills: 4 | Status: SHIPPED | OUTPATIENT
Start: 2022-03-29 | End: 2022-04-28

## 2022-03-29 RX ORDER — ETONOGESTREL AND ETHINYL ESTRADIOL 11.7; 2.7 MG/1; MG/1
INSERT, EXTENDED RELEASE VAGINAL
Qty: 1 EACH | Refills: 1 | Status: SHIPPED | OUTPATIENT
Start: 2022-03-29

## 2022-03-29 NOTE — PROGRESS NOTES
Please notify patient that his/her blood test showed low levels of vitamin D. A prescription was sent to his pharmacy to take one capsule or tablet, once a week. This Rx is good for one year. We'll recheck levels in one year.   Also had normal CBC, CMP, Lipids, a1c, TSH

## 2022-04-07 ENCOUNTER — OFFICE VISIT (OUTPATIENT)
Dept: OBGYN CLINIC | Facility: CLINIC | Age: 37
End: 2022-04-07
Payer: COMMERCIAL

## 2022-04-07 VITALS — SYSTOLIC BLOOD PRESSURE: 128 MMHG | DIASTOLIC BLOOD PRESSURE: 85 MMHG

## 2022-04-07 DIAGNOSIS — Z30.09 BIRTH CONTROL COUNSELING: ICD-10-CM

## 2022-04-07 DIAGNOSIS — T83.32XA INTRAUTERINE CONTRACEPTIVE DEVICE THREADS LOST, INITIAL ENCOUNTER: Primary | ICD-10-CM

## 2022-04-07 PROCEDURE — 3074F SYST BP LT 130 MM HG: CPT | Performed by: OBSTETRICS & GYNECOLOGY

## 2022-04-07 PROCEDURE — 99243 OFF/OP CNSLTJ NEW/EST LOW 30: CPT | Performed by: OBSTETRICS & GYNECOLOGY

## 2022-04-07 PROCEDURE — 3079F DIAST BP 80-89 MM HG: CPT | Performed by: OBSTETRICS & GYNECOLOGY

## 2022-04-08 ENCOUNTER — HOSPITAL ENCOUNTER (OUTPATIENT)
Dept: MAMMOGRAPHY | Age: 37
Discharge: HOME | End: 2022-04-08
Attending: FAMILY MEDICINE
Payer: COMMERCIAL

## 2022-04-08 DIAGNOSIS — Z80.3 FAMILY HISTORY OF BREAST CANCER: ICD-10-CM

## 2022-04-08 DIAGNOSIS — Z00.00 PHYSICAL EXAM: ICD-10-CM

## 2022-04-08 PROCEDURE — 77063 BREAST TOMOSYNTHESIS BI: CPT | Performed by: FAMILY MEDICINE

## 2022-04-08 PROCEDURE — 77067 SCR MAMMO BI INCL CAD: CPT | Performed by: FAMILY MEDICINE

## 2022-04-11 ENCOUNTER — HOSPITAL ENCOUNTER (OUTPATIENT)
Dept: GENERAL RADIOLOGY | Facility: HOSPITAL | Age: 37
Discharge: HOME | End: 2022-04-11
Attending: ORTHOPAEDIC SURGERY
Payer: COMMERCIAL

## 2022-04-11 ENCOUNTER — OFFICE VISIT (OUTPATIENT)
Dept: ORTHOPEDICS CLINIC | Facility: CLINIC | Age: 37
End: 2022-04-11
Payer: COMMERCIAL

## 2022-04-11 ENCOUNTER — OFFICE VISIT (OUTPATIENT)
Dept: OBGYN CLINIC | Facility: CLINIC | Age: 37
End: 2022-04-11
Payer: COMMERCIAL

## 2022-04-11 ENCOUNTER — TELEPHONE (OUTPATIENT)
Dept: OBGYN CLINIC | Facility: CLINIC | Age: 37
End: 2022-04-11

## 2022-04-11 VITALS — SYSTOLIC BLOOD PRESSURE: 150 MMHG | DIASTOLIC BLOOD PRESSURE: 90 MMHG | HEART RATE: 72 BPM

## 2022-04-11 DIAGNOSIS — M79.601 RIGHT ARM PAIN: ICD-10-CM

## 2022-04-11 DIAGNOSIS — M54.12 CERVICAL RADICULOPATHY: Primary | ICD-10-CM

## 2022-04-11 DIAGNOSIS — T83.32XD INTRAUTERINE CONTRACEPTIVE DEVICE THREADS LOST, SUBSEQUENT ENCOUNTER: Primary | ICD-10-CM

## 2022-04-11 PROCEDURE — 3077F SYST BP >= 140 MM HG: CPT | Performed by: OBSTETRICS & GYNECOLOGY

## 2022-04-11 PROCEDURE — 76830 TRANSVAGINAL US NON-OB: CPT | Performed by: OBSTETRICS & GYNECOLOGY

## 2022-04-11 PROCEDURE — 3080F DIAST BP >= 90 MM HG: CPT | Performed by: OBSTETRICS & GYNECOLOGY

## 2022-04-11 PROCEDURE — 72040 X-RAY EXAM NECK SPINE 2-3 VW: CPT | Performed by: ORTHOPAEDIC SURGERY

## 2022-04-11 PROCEDURE — 99243 OFF/OP CNSLTJ NEW/EST LOW 30: CPT | Performed by: ORTHOPAEDIC SURGERY

## 2022-04-11 PROCEDURE — 99212 OFFICE O/P EST SF 10 MIN: CPT | Performed by: OBSTETRICS & GYNECOLOGY

## 2022-04-11 RX ORDER — METHYLPREDNISOLONE 4 MG/1
TABLET ORAL
Qty: 21 TABLET | Refills: 0 | Status: SHIPPED | OUTPATIENT
Start: 2022-04-11

## 2022-04-11 NOTE — TELEPHONE ENCOUNTER
Pt scheduled for Office operative hysteroscopy with removal of IUD; placement of a ParaGard IUD on Friday, 04/29/2022. Reports menses should start on 4/26 and lasts about 4 days.      msg sent to JN vasquez as Dieudonne Matute

## 2022-04-11 NOTE — TELEPHONE ENCOUNTER
Rodrigo Schultz MD  You; [facility] Clinical Staff Just now (3:45 PM)     Patient should be instructed to take Motrin/Advil/ or Ibuprofen 600 mg PO one hour prior to procedure.

## 2022-04-11 NOTE — TELEPHONE ENCOUNTER
Please schedule office operative hysteroscopy for removal of IUD with lost IUD strings and replacement with new ParaGard IUD. Please schedule the following surgery:    Procedure: Office operative hysteroscopy with removal of IUD; placement of a ParaGard IUD  Assist: (Y/N or none) none  Date: At the very tail end of menses  Dx:  ParaGard IUD with lost IUD strings  Pre-op appt: (Y/N or n/a) done  Admission type: (IN/OUT/OBVS) in office  Department of discharge(SDS/Floor): In office  Procedure length time (please enter amount you are requesting): 30 minutes  Recovery time (patients always ask): A couple of hours  Medical Clearance: (Y/N) no      ALL Medicaid/including BCBS community: Tubal/ Hyst form MUST be signed (30 days): COVID19 Lab 5062 needs to be placed for all C-sections, IOL & Versions     Message to nurses: Please make sure that we have functioning hysteroscopic graspers.   May use paracervical block so need lidocaine 1% and cervical dilator

## 2022-04-27 ENCOUNTER — TELEPHONE (OUTPATIENT)
Dept: OBGYN CLINIC | Facility: CLINIC | Age: 37
End: 2022-04-27

## 2022-04-27 NOTE — TELEPHONE ENCOUNTER
Patient name and  verified. Patient states she has not gotten her cycle for this month. Advised patient message would be sent to surgery pool for scheduling. Verbalized understanding.

## 2022-04-27 NOTE — TELEPHONE ENCOUNTER
Patient has not gotten her period yet and is concerned that her procedure on Friday needs to be done on the last day of her menses. Please advise.     German speaking

## 2022-04-27 NOTE — TELEPHONE ENCOUNTER
Jair Kilgore MD  You; [facility] 2 minutes ago (4:38 PM)         Schedule for after next menses    Message text

## 2022-05-02 NOTE — TELEPHONE ENCOUNTER
Called and spoke to pt. Reports menses started on 4/29. Should be due for next cycle on 5/27. Scheduled for office hysteroscopy on 5/31.

## 2022-05-31 ENCOUNTER — OFFICE VISIT (OUTPATIENT)
Dept: OBGYN CLINIC | Facility: CLINIC | Age: 37
End: 2022-05-31
Payer: COMMERCIAL

## 2022-05-31 VITALS — HEART RATE: 73 BPM | SYSTOLIC BLOOD PRESSURE: 115 MMHG | DIASTOLIC BLOOD PRESSURE: 73 MMHG

## 2022-05-31 DIAGNOSIS — Z01.812 PRE-PROCEDURAL LABORATORY EXAMINATION: ICD-10-CM

## 2022-05-31 DIAGNOSIS — T83.32XD INTRAUTERINE CONTRACEPTIVE DEVICE THREADS LOST, SUBSEQUENT ENCOUNTER: Primary | ICD-10-CM

## 2022-05-31 PROBLEM — R20.0 NUMBNESS OF LEFT FOOT: Status: RESOLVED | Noted: 2020-01-21 | Resolved: 2022-05-31

## 2022-05-31 PROBLEM — R20.8 NUMBNESS OF LEFT FOOT: Status: RESOLVED | Noted: 2020-01-21 | Resolved: 2022-05-31

## 2022-05-31 PROBLEM — J11.1 INFLUENZA: Status: RESOLVED | Noted: 2019-03-12 | Resolved: 2022-05-31

## 2022-05-31 PROBLEM — S20.212A CHEST WALL CONTUSION, LEFT, INITIAL ENCOUNTER: Status: RESOLVED | Noted: 2019-01-04 | Resolved: 2022-05-31

## 2022-05-31 LAB
CONTROL LINE PRESENT WITH A CLEAR BACKGROUND (YES/NO): YES YES/NO
KIT LOT #: NORMAL NUMERIC
PREGNANCY TEST, URINE: NEGATIVE

## 2022-05-31 PROCEDURE — 81025 URINE PREGNANCY TEST: CPT | Performed by: OBSTETRICS & GYNECOLOGY

## 2022-05-31 PROCEDURE — 3078F DIAST BP <80 MM HG: CPT | Performed by: OBSTETRICS & GYNECOLOGY

## 2022-05-31 PROCEDURE — 3074F SYST BP LT 130 MM HG: CPT | Performed by: OBSTETRICS & GYNECOLOGY

## 2022-05-31 PROCEDURE — 99213 OFFICE O/P EST LOW 20 MIN: CPT | Performed by: OBSTETRICS & GYNECOLOGY

## 2022-05-31 PROCEDURE — 58558 HYSTEROSCOPY BIOPSY: CPT | Performed by: OBSTETRICS & GYNECOLOGY

## 2022-05-31 RX ORDER — NORELGESTROMIN AND ETHINYL ESTRADIOL 150; 35 UG/D; UG/D
1 PATCH TRANSDERMAL WEEKLY
Qty: 3 PATCH | Refills: 0 | Status: SHIPPED | OUTPATIENT
Start: 2022-05-31

## 2022-05-31 RX ORDER — CHOLECALCIFEROL (VITAMIN D3) 1250 MCG
CAPSULE ORAL
COMMUNITY

## 2022-05-31 RX ORDER — LIDOCAINE HYDROCHLORIDE AND EPINEPHRINE BITARTRATE 20; .01 MG/ML; MG/ML
1.7 INJECTION, SOLUTION SUBCUTANEOUS ONCE
Status: SHIPPED | OUTPATIENT
Start: 2022-05-31

## 2022-05-31 NOTE — PROCEDURES
See Endo see hysteroscopic removal of IUD note  Well sterile speculum still in place and cervix and vagina prepped with Betadine once again  Local anesthetic with 2% lidocaine was instilled circumferentially cervix. The cervix was gently dilated and the patient did not tolerate dilation of the internal cervical os. Procedure was discontinued due to patient intolerance of the procedure. Discussed options of contraception and the patient elects Maridee Phill patch. Will prescribe for 3 months. Counseled on risks and benefits and elected to proceed. Follow-up in 3 months.

## 2022-05-31 NOTE — PROCEDURES
Endosee Procedure     LMP: 22  Pregnancy Results: negative  Birth control method(s) used: Paragard IUD    Consent signed. Procedure discussed with the patient in detail including indication, risks, benefits, alternatives and complications. Indication:  Lost IUD strings;  Paragard IUD    Procedure:  Operative hysteroscopy with removal of IUD (Endosee)    Speculum placed in the vagina. Betadine wash of vagina and cervix performed. Single tooth tenaculum was placed at the 12 o'clock position. Endocervical dilator placed within cervical canal until slight resistance bypassed and left in place. Dilator exchanged for Endosee catheter. Camera attached. Assistant injected 1 cc / sec slowly allowing visualization of endometrial cavity. Fallopian tubes os No bilaterally. Findings: Normal endocervical canal.  IUD strings visible near internal cervical os. The fluid was withdrawn from cavity while camera was remove**Endometrial biopsy performed with 2 passes -- sent to path. Single tooth tenaculum removed. Silver nitrate used to achieve hemostasis. Good hemostasis noted. Patient tolerated procedure well. Attempt at IUD insertion was attempted and unsuccessful. See note.       Visit Plan:  Patient planning contraception use of Xulane patch

## 2022-06-21 ENCOUNTER — TELEPHONE (OUTPATIENT)
Dept: OBGYN CLINIC | Facility: CLINIC | Age: 37
End: 2022-06-21

## 2022-06-21 NOTE — TELEPHONE ENCOUNTER
Pt is calling said she needs a script  For her  Birth control ,  DR gave only 1 month and needs  More ,

## 2022-06-22 NOTE — TELEPHONE ENCOUNTER
RN placed call to patient via the language line,  name Manasa Briscoe  ID#: [id number]    Advised that Rx was refilled on 6/21. Verbalizes understanding and agrees with plan.

## 2022-06-29 ENCOUNTER — HOSPITAL ENCOUNTER (EMERGENCY)
Facility: HOSPITAL | Age: 37
Discharge: HOME | End: 2022-06-29
Attending: EMERGENCY MEDICINE
Payer: COMMERCIAL

## 2022-06-29 ENCOUNTER — NURSE TRIAGE (OUTPATIENT)
Dept: FAMILY MEDICINE CLINIC | Facility: CLINIC | Age: 37
End: 2022-06-29

## 2022-06-29 VITALS
OXYGEN SATURATION: 98 % | TEMPERATURE: 98 F | SYSTOLIC BLOOD PRESSURE: 122 MMHG | BODY MASS INDEX: 31.24 KG/M2 | HEART RATE: 85 BPM | RESPIRATION RATE: 18 BRPM | HEIGHT: 64 IN | WEIGHT: 183 LBS | DIASTOLIC BLOOD PRESSURE: 75 MMHG

## 2022-06-29 DIAGNOSIS — K29.70 GASTRITIS, PRESENCE OF BLEEDING UNSPECIFIED, UNSPECIFIED CHRONICITY, UNSPECIFIED GASTRITIS TYPE: Primary | ICD-10-CM

## 2022-06-29 LAB
ALBUMIN SERPL-MCNC: 3.8 G/DL (ref 3.4–5)
ALP LIVER SERPL-CCNC: 62 U/L
ALT SERPL-CCNC: 19 U/L
ANION GAP SERPL CALC-SCNC: 6 MMOL/L (ref 0–18)
AST SERPL-CCNC: 16 U/L (ref 15–37)
B-HCG UR QL: NEGATIVE
BASOPHILS # BLD AUTO: 0.03 X10(3) UL (ref 0–0.2)
BASOPHILS NFR BLD AUTO: 0.4 %
BILIRUB DIRECT SERPL-MCNC: <0.1 MG/DL (ref 0–0.2)
BILIRUB SERPL-MCNC: 0.4 MG/DL (ref 0.1–2)
BUN BLD-MCNC: 10 MG/DL (ref 7–18)
BUN/CREAT SERPL: 14.5 (ref 10–20)
CALCIUM BLD-MCNC: 9.1 MG/DL (ref 8.5–10.1)
CHLORIDE SERPL-SCNC: 103 MMOL/L (ref 98–112)
CO2 SERPL-SCNC: 27 MMOL/L (ref 21–32)
CREAT BLD-MCNC: 0.69 MG/DL
DEPRECATED RDW RBC AUTO: 39.7 FL (ref 35.1–46.3)
EOSINOPHIL # BLD AUTO: 0.05 X10(3) UL (ref 0–0.7)
EOSINOPHIL NFR BLD AUTO: 0.7 %
ERYTHROCYTE [DISTWIDTH] IN BLOOD BY AUTOMATED COUNT: 11.9 % (ref 11–15)
GLUCOSE BLD-MCNC: 92 MG/DL (ref 70–99)
HCT VFR BLD AUTO: 37.4 %
HGB BLD-MCNC: 12.7 G/DL
IMM GRANULOCYTES # BLD AUTO: 0.02 X10(3) UL (ref 0–1)
IMM GRANULOCYTES NFR BLD: 0.3 %
LIPASE SERPL-CCNC: 125 U/L (ref 73–393)
LYMPHOCYTES # BLD AUTO: 2.61 X10(3) UL (ref 1–4)
LYMPHOCYTES NFR BLD AUTO: 36.7 %
MCH RBC QN AUTO: 30.9 PG (ref 26–34)
MCHC RBC AUTO-ENTMCNC: 34 G/DL (ref 31–37)
MCV RBC AUTO: 91 FL
MONOCYTES # BLD AUTO: 0.32 X10(3) UL (ref 0.1–1)
MONOCYTES NFR BLD AUTO: 4.5 %
NEUTROPHILS # BLD AUTO: 4.08 X10 (3) UL (ref 1.5–7.7)
NEUTROPHILS # BLD AUTO: 4.08 X10(3) UL (ref 1.5–7.7)
NEUTROPHILS NFR BLD AUTO: 57.4 %
OSMOLALITY SERPL CALC.SUM OF ELEC: 281 MOSM/KG (ref 275–295)
PLATELET # BLD AUTO: 299 10(3)UL (ref 150–450)
POTASSIUM SERPL-SCNC: 3.6 MMOL/L (ref 3.5–5.1)
PROT SERPL-MCNC: 7.7 G/DL (ref 6.4–8.2)
RBC # BLD AUTO: 4.11 X10(6)UL
SODIUM SERPL-SCNC: 136 MMOL/L (ref 136–145)
WBC # BLD AUTO: 7.1 X10(3) UL (ref 4–11)

## 2022-06-29 PROCEDURE — 80076 HEPATIC FUNCTION PANEL: CPT | Performed by: EMERGENCY MEDICINE

## 2022-06-29 PROCEDURE — 80048 BASIC METABOLIC PNL TOTAL CA: CPT | Performed by: EMERGENCY MEDICINE

## 2022-06-29 PROCEDURE — 93005 ELECTROCARDIOGRAM TRACING: CPT

## 2022-06-29 PROCEDURE — 83690 ASSAY OF LIPASE: CPT | Performed by: EMERGENCY MEDICINE

## 2022-06-29 PROCEDURE — 81025 URINE PREGNANCY TEST: CPT

## 2022-06-29 PROCEDURE — 85025 COMPLETE CBC W/AUTO DIFF WBC: CPT | Performed by: EMERGENCY MEDICINE

## 2022-06-29 PROCEDURE — 99284 EMERGENCY DEPT VISIT MOD MDM: CPT

## 2022-06-29 PROCEDURE — S0028 INJECTION, FAMOTIDINE, 20 MG: HCPCS | Performed by: EMERGENCY MEDICINE

## 2022-06-29 PROCEDURE — 93010 ELECTROCARDIOGRAM REPORT: CPT | Performed by: EMERGENCY MEDICINE

## 2022-06-29 PROCEDURE — 96374 THER/PROPH/DIAG INJ IV PUSH: CPT

## 2022-06-29 RX ORDER — NICOTINE POLACRILEX 4 MG/1
20 GUM, CHEWING ORAL DAILY
Qty: 30 TABLET | Refills: 0 | Status: SHIPPED | OUTPATIENT
Start: 2022-06-29 | End: 2022-07-08

## 2022-06-29 RX ORDER — FAMOTIDINE 10 MG/ML
20 INJECTION, SOLUTION INTRAVENOUS ONCE
Status: COMPLETED | OUTPATIENT
Start: 2022-06-29 | End: 2022-06-29

## 2022-06-29 RX ORDER — ONDANSETRON 4 MG/1
4 TABLET, ORALLY DISINTEGRATING ORAL EVERY 4 HOURS PRN
Qty: 10 TABLET | Refills: 0 | Status: SHIPPED | OUTPATIENT
Start: 2022-06-29 | End: 2022-07-06

## 2022-06-29 NOTE — ED INITIAL ASSESSMENT (HPI)
Abdominal pain ULQ x4 days on and off 3/10. Patient denies n/v/d.  Patient states she thinks she feels some pressure in her chest

## 2022-06-29 NOTE — ED QUICK NOTES
Received patient in room 15. AOX3. Ambulatory. C/o epigastric pain that radiates to the LUQ x 4 days. Reports nausea and feeling bloated. Last BM a few days ago. Denies diarrhea/fevers/chills/trauma/SOB/CP. PMH acid reflux and takes omeprazole PRN.

## 2022-07-08 ENCOUNTER — OFFICE VISIT (OUTPATIENT)
Dept: FAMILY MEDICINE CLINIC | Facility: CLINIC | Age: 37
End: 2022-07-08
Payer: COMMERCIAL

## 2022-07-08 VITALS
SYSTOLIC BLOOD PRESSURE: 111 MMHG | HEIGHT: 64 IN | HEART RATE: 68 BPM | BODY MASS INDEX: 32.64 KG/M2 | WEIGHT: 191.19 LBS | DIASTOLIC BLOOD PRESSURE: 71 MMHG

## 2022-07-08 DIAGNOSIS — R10.12 ABDOMINAL PAIN, LUQ: Primary | ICD-10-CM

## 2022-07-08 LAB
APPEARANCE: CLEAR
BILIRUBIN: NEGATIVE
GLUCOSE (URINE DIPSTICK): NEGATIVE MG/DL
KETONES (URINE DIPSTICK): NEGATIVE MG/DL
LEUKOCYTES: NEGATIVE
MULTISTIX EXPIRATION DATE: ABNORMAL DATE
MULTISTIX LOT#: ABNORMAL NUMERIC
NITRITE, URINE: NEGATIVE
PH, URINE: 7 (ref 4.5–8)
PROTEIN (URINE DIPSTICK): NEGATIVE MG/DL
SPECIFIC GRAVITY: 1.02 (ref 1–1.03)
URINE-COLOR: YELLOW
UROBILINOGEN,SEMI-QN: 0.2 MG/DL (ref 0–1.9)

## 2022-07-08 PROCEDURE — 3078F DIAST BP <80 MM HG: CPT | Performed by: FAMILY MEDICINE

## 2022-07-08 PROCEDURE — 3008F BODY MASS INDEX DOCD: CPT | Performed by: FAMILY MEDICINE

## 2022-07-08 PROCEDURE — 99213 OFFICE O/P EST LOW 20 MIN: CPT | Performed by: FAMILY MEDICINE

## 2022-07-08 PROCEDURE — 3074F SYST BP LT 130 MM HG: CPT | Performed by: FAMILY MEDICINE

## 2022-07-08 PROCEDURE — 81003 URINALYSIS AUTO W/O SCOPE: CPT | Performed by: FAMILY MEDICINE

## 2022-07-08 RX ORDER — ACETAMINOPHEN 500 MG
500 TABLET ORAL EVERY 6 HOURS PRN
Qty: 50 TABLET | Refills: 0 | Status: SHIPPED | OUTPATIENT
Start: 2022-07-08 | End: 2022-08-07

## 2022-09-06 PROBLEM — Z30.45 ENCOUNTER FOR SURVEILLANCE OF TRANSDERMAL PATCH HORMONAL CONTRACEPTIVE DEVICE: Status: ACTIVE | Noted: 2022-09-06

## 2022-10-21 ENCOUNTER — HOSPITAL ENCOUNTER (OUTPATIENT)
Dept: GENERAL RADIOLOGY | Age: 37
Discharge: HOME | End: 2022-10-21
Attending: FAMILY MEDICINE
Payer: COMMERCIAL

## 2022-10-21 ENCOUNTER — LAB ENCOUNTER (OUTPATIENT)
Dept: LAB | Age: 37
End: 2022-10-21
Attending: FAMILY MEDICINE
Payer: COMMERCIAL

## 2022-10-21 ENCOUNTER — OFFICE VISIT (OUTPATIENT)
Dept: FAMILY MEDICINE CLINIC | Facility: CLINIC | Age: 37
End: 2022-10-21
Payer: COMMERCIAL

## 2022-10-21 VITALS
HEART RATE: 70 BPM | DIASTOLIC BLOOD PRESSURE: 78 MMHG | WEIGHT: 189.31 LBS | HEIGHT: 64 IN | RESPIRATION RATE: 18 BRPM | BODY MASS INDEX: 32.32 KG/M2 | SYSTOLIC BLOOD PRESSURE: 116 MMHG

## 2022-10-21 DIAGNOSIS — M79.641 RIGHT HAND PAIN: ICD-10-CM

## 2022-10-21 DIAGNOSIS — M79.641 RIGHT HAND PAIN: Primary | ICD-10-CM

## 2022-10-21 LAB — HCG SERPL QL: NEGATIVE

## 2022-10-21 PROCEDURE — 3074F SYST BP LT 130 MM HG: CPT | Performed by: FAMILY MEDICINE

## 2022-10-21 PROCEDURE — 84703 CHORIONIC GONADOTROPIN ASSAY: CPT

## 2022-10-21 PROCEDURE — 99213 OFFICE O/P EST LOW 20 MIN: CPT | Performed by: FAMILY MEDICINE

## 2022-10-21 PROCEDURE — 3008F BODY MASS INDEX DOCD: CPT | Performed by: FAMILY MEDICINE

## 2022-10-21 PROCEDURE — 3078F DIAST BP <80 MM HG: CPT | Performed by: FAMILY MEDICINE

## 2022-10-21 PROCEDURE — 36415 COLL VENOUS BLD VENIPUNCTURE: CPT

## 2022-10-21 PROCEDURE — 73130 X-RAY EXAM OF HAND: CPT | Performed by: FAMILY MEDICINE

## 2022-10-21 RX ORDER — PREDNISONE 10 MG/1
10 TABLET ORAL 2 TIMES DAILY
Qty: 10 TABLET | Refills: 0 | Status: SHIPPED | OUTPATIENT
Start: 2022-10-21

## 2022-10-21 RX ORDER — PREDNISONE 10 MG/1
10 TABLET ORAL 2 TIMES DAILY
Qty: 10 TABLET | Refills: 0 | Status: SHIPPED | OUTPATIENT
Start: 2022-10-21 | End: 2022-10-21

## 2022-10-21 NOTE — PROGRESS NOTES
Blood pressure 116/78, pulse 70, resp. rate 18, height 5' 4\" (1.626 m), weight 189 lb 4.8 oz (85.9 kg), last menstrual period 06/17/2022, not currently breastfeeding. Complaining of right pinky and hand pain. She is right-handed. Runs a cleaning business. Denies trauma. No night sweats prolonged morning stiffness or rashes. No family history of rheumatism.     Objective right finger with small Heberden's node at the DIP also medial deviation of the right pinky  Negative Tinel's test for medial epicondylitis  Assessment likely osteoarthritis    Plan hand x-ray ordered also    Will check for pregnancy and prednisone prescription sent

## 2022-11-21 ENCOUNTER — OFFICE VISIT (OUTPATIENT)
Dept: FAMILY MEDICINE CLINIC | Facility: CLINIC | Age: 37
End: 2022-11-21
Payer: COMMERCIAL

## 2022-11-21 VITALS
BODY MASS INDEX: 32.1 KG/M2 | HEIGHT: 64 IN | WEIGHT: 188 LBS | HEART RATE: 71 BPM | DIASTOLIC BLOOD PRESSURE: 81 MMHG | SYSTOLIC BLOOD PRESSURE: 123 MMHG

## 2022-11-21 DIAGNOSIS — M26.609 TMJ (TEMPOROMANDIBULAR JOINT DISORDER): ICD-10-CM

## 2022-11-21 DIAGNOSIS — Z30.017 ENCOUNTER FOR INITIAL PRESCRIPTION OF IMPLANTABLE SUBDERMAL CONTRACEPTIVE: ICD-10-CM

## 2022-11-21 DIAGNOSIS — M65.351 TRIGGER LITTLE FINGER OF RIGHT HAND: Primary | ICD-10-CM

## 2022-12-03 ENCOUNTER — OFFICE VISIT (OUTPATIENT)
Dept: FAMILY MEDICINE CLINIC | Facility: CLINIC | Age: 37
End: 2022-12-03
Payer: COMMERCIAL

## 2022-12-03 VITALS
BODY MASS INDEX: 30.73 KG/M2 | WEIGHT: 180 LBS | SYSTOLIC BLOOD PRESSURE: 118 MMHG | DIASTOLIC BLOOD PRESSURE: 73 MMHG | HEART RATE: 66 BPM | HEIGHT: 64 IN

## 2022-12-03 DIAGNOSIS — Z23 INFLUENZA VACCINE NEEDED: ICD-10-CM

## 2022-12-03 DIAGNOSIS — Z11.3 SCREEN FOR STD (SEXUALLY TRANSMITTED DISEASE): Primary | ICD-10-CM

## 2022-12-03 PROCEDURE — 3008F BODY MASS INDEX DOCD: CPT | Performed by: NURSE PRACTITIONER

## 2022-12-03 PROCEDURE — 90686 IIV4 VACC NO PRSV 0.5 ML IM: CPT | Performed by: NURSE PRACTITIONER

## 2022-12-03 PROCEDURE — 3078F DIAST BP <80 MM HG: CPT | Performed by: NURSE PRACTITIONER

## 2022-12-03 PROCEDURE — 3074F SYST BP LT 130 MM HG: CPT | Performed by: NURSE PRACTITIONER

## 2022-12-03 PROCEDURE — 99213 OFFICE O/P EST LOW 20 MIN: CPT | Performed by: NURSE PRACTITIONER

## 2022-12-03 PROCEDURE — 90471 IMMUNIZATION ADMIN: CPT | Performed by: NURSE PRACTITIONER

## 2022-12-05 ENCOUNTER — LAB ENCOUNTER (OUTPATIENT)
Dept: LAB | Age: 37
End: 2022-12-05
Attending: NURSE PRACTITIONER
Payer: COMMERCIAL

## 2022-12-05 DIAGNOSIS — Z11.3 SCREENING EXAMINATION FOR VENEREAL DISEASE: Primary | ICD-10-CM

## 2022-12-05 DIAGNOSIS — Z11.3 SCREEN FOR STD (SEXUALLY TRANSMITTED DISEASE): ICD-10-CM

## 2022-12-05 PROCEDURE — 87389 HIV-1 AG W/HIV-1&-2 AB AG IA: CPT

## 2022-12-05 PROCEDURE — 87591 N.GONORRHOEAE DNA AMP PROB: CPT

## 2022-12-05 PROCEDURE — 87491 CHLMYD TRACH DNA AMP PROBE: CPT

## 2022-12-05 PROCEDURE — 36415 COLL VENOUS BLD VENIPUNCTURE: CPT

## 2022-12-05 PROCEDURE — 86695 HERPES SIMPLEX TYPE 1 TEST: CPT

## 2022-12-05 PROCEDURE — 86696 HERPES SIMPLEX TYPE 2 TEST: CPT

## 2022-12-05 PROCEDURE — 86780 TREPONEMA PALLIDUM: CPT

## 2022-12-06 LAB
C TRACH DNA SPEC QL NAA+PROBE: NEGATIVE
N GONORRHOEA DNA SPEC QL NAA+PROBE: NEGATIVE

## 2022-12-07 LAB
HSV 1 GLYCOPROTEIN G, IGG: POSITIVE
HSV 2 GLYCOPROTEIN G, IGG: POSITIVE
T PALLIDUM AB SER QL: NEGATIVE

## 2022-12-09 ENCOUNTER — TELEPHONE (OUTPATIENT)
Dept: FAMILY MEDICINE CLINIC | Facility: CLINIC | Age: 37
End: 2022-12-09

## 2022-12-09 NOTE — TELEPHONE ENCOUNTER
With  Julien #[id number], advised patient that lab results have not been reviewed yet by Jia Sarmiento, as soon as they are reviewed we will give her a call. Patient verbalized understanding.

## 2022-12-15 ENCOUNTER — OFFICE VISIT (OUTPATIENT)
Dept: FAMILY MEDICINE CLINIC | Facility: CLINIC | Age: 37
End: 2022-12-15
Payer: COMMERCIAL

## 2022-12-15 ENCOUNTER — TELEPHONE (OUTPATIENT)
Dept: FAMILY MEDICINE CLINIC | Facility: CLINIC | Age: 37
End: 2022-12-15

## 2022-12-15 VITALS
SYSTOLIC BLOOD PRESSURE: 128 MMHG | HEIGHT: 64 IN | HEART RATE: 60 BPM | WEIGHT: 184.19 LBS | BODY MASS INDEX: 31.45 KG/M2 | DIASTOLIC BLOOD PRESSURE: 70 MMHG

## 2022-12-15 DIAGNOSIS — Z30.017 ENCOUNTER FOR INITIAL PRESCRIPTION OF IMPLANTABLE SUBDERMAL CONTRACEPTIVE: Primary | ICD-10-CM

## 2022-12-15 LAB
CONTROL LINE PRESENT WITH A CLEAR BACKGROUND (YES/NO): PRESENT YES/NO
PREGNANCY TEST, URINE: NEGATIVE

## 2022-12-15 PROCEDURE — 81025 URINE PREGNANCY TEST: CPT | Performed by: FAMILY MEDICINE

## 2022-12-15 PROCEDURE — 3078F DIAST BP <80 MM HG: CPT | Performed by: FAMILY MEDICINE

## 2022-12-15 PROCEDURE — 3074F SYST BP LT 130 MM HG: CPT | Performed by: FAMILY MEDICINE

## 2022-12-15 PROCEDURE — 3008F BODY MASS INDEX DOCD: CPT | Performed by: FAMILY MEDICINE

## 2022-12-15 NOTE — TELEPHONE ENCOUNTER
Patient MICAS says return in one week from 12/15/22. Patient just found she is pregnant. Please call with an apointment time.  Thank you

## 2022-12-16 NOTE — TELEPHONE ENCOUNTER
Called patient with Language Line  Nell Bryson  ID# [id number]        Left Voicemail to call back our office. Office phone number provided with office telephone  hours.

## 2022-12-17 ENCOUNTER — TELEPHONE (OUTPATIENT)
Dept: FAMILY MEDICINE CLINIC | Facility: CLINIC | Age: 37
End: 2022-12-17

## 2022-12-17 NOTE — TELEPHONE ENCOUNTER
Using language line : Herman Irene ID number [id number]    Per patient she is aware she is not pregnant. She just needs to schedule 1 week follow up for check of birth control implantation.      Future Appointments   Date Time Provider Gil Walls   12/22/2022  2:45 PM Celestina Pretty MD [facility]

## 2022-12-17 NOTE — TELEPHONE ENCOUNTER
Spoke to pt  was confirmed pt states she never took pregnancy test at home, she knows she is not pregnant because she is in her menstrual cycle, pt is aware of the misunderstanding and verbalized understanding.

## 2022-12-29 ENCOUNTER — OFFICE VISIT (OUTPATIENT)
Dept: FAMILY MEDICINE CLINIC | Facility: CLINIC | Age: 37
End: 2022-12-29
Payer: COMMERCIAL

## 2022-12-29 VITALS
BODY MASS INDEX: 31.31 KG/M2 | SYSTOLIC BLOOD PRESSURE: 135 MMHG | HEIGHT: 64 IN | WEIGHT: 183.38 LBS | HEART RATE: 78 BPM | DIASTOLIC BLOOD PRESSURE: 80 MMHG

## 2022-12-29 DIAGNOSIS — Z30.45 ENCOUNTER FOR SURVEILLANCE OF TRANSDERMAL PATCH HORMONAL CONTRACEPTIVE DEVICE: Primary | ICD-10-CM

## 2022-12-29 PROCEDURE — 3079F DIAST BP 80-89 MM HG: CPT | Performed by: FAMILY MEDICINE

## 2022-12-29 PROCEDURE — 99212 OFFICE O/P EST SF 10 MIN: CPT | Performed by: FAMILY MEDICINE

## 2022-12-29 PROCEDURE — 3008F BODY MASS INDEX DOCD: CPT | Performed by: FAMILY MEDICINE

## 2022-12-29 PROCEDURE — 3075F SYST BP GE 130 - 139MM HG: CPT | Performed by: FAMILY MEDICINE

## 2023-01-10 ENCOUNTER — NURSE TRIAGE (OUTPATIENT)
Dept: FAMILY MEDICINE CLINIC | Facility: CLINIC | Age: 38
End: 2023-01-10

## 2023-01-10 ENCOUNTER — HOSPITAL ENCOUNTER (OUTPATIENT)
Age: 38
Discharge: HOME | End: 2023-01-10
Payer: COMMERCIAL

## 2023-01-10 VITALS
WEIGHT: 180 LBS | SYSTOLIC BLOOD PRESSURE: 132 MMHG | TEMPERATURE: 98 F | HEIGHT: 64 IN | BODY MASS INDEX: 30.73 KG/M2 | DIASTOLIC BLOOD PRESSURE: 77 MMHG | OXYGEN SATURATION: 97 % | RESPIRATION RATE: 18 BRPM | HEART RATE: 70 BPM

## 2023-01-10 DIAGNOSIS — R11.2 NAUSEA VOMITING AND DIARRHEA: Primary | ICD-10-CM

## 2023-01-10 DIAGNOSIS — R19.7 NAUSEA VOMITING AND DIARRHEA: Primary | ICD-10-CM

## 2023-01-10 DIAGNOSIS — Z20.822 LAB TEST NEGATIVE FOR COVID-19 VIRUS: ICD-10-CM

## 2023-01-10 LAB
#MXD IC: 0.5 X10ˆ3/UL (ref 0.1–1)
B-HCG UR QL: NEGATIVE
BILIRUB UR QL STRIP: NEGATIVE
BUN BLD-MCNC: 11 MG/DL (ref 7–18)
CHLORIDE BLD-SCNC: 103 MMOL/L (ref 98–112)
CO2 BLD-SCNC: 23 MMOL/L (ref 21–32)
COLOR UR: YELLOW
CREAT BLD-MCNC: 0.5 MG/DL
GFR SERPLBLD BASED ON 1.73 SQ M-ARVRAT: 124 ML/MIN/1.73M2 (ref 60–?)
GLUCOSE BLD-MCNC: 107 MG/DL (ref 70–99)
GLUCOSE UR STRIP-MCNC: NEGATIVE MG/DL
HCT VFR BLD AUTO: 39.2 %
HCT VFR BLD CALC: 43 %
HGB BLD-MCNC: 13.6 G/DL
ISTAT IONIZED CALCIUM FOR CHEM 8: 1.13 MMOL/L (ref 1.12–1.32)
KETONES UR STRIP-MCNC: NEGATIVE MG/DL
LEUKOCYTE ESTERASE UR QL STRIP: NEGATIVE
LYMPHOCYTES # BLD AUTO: 1.3 X10ˆ3/UL (ref 1–4)
LYMPHOCYTES NFR BLD AUTO: 16.6 %
MCH RBC QN AUTO: 30.8 PG (ref 26–34)
MCHC RBC AUTO-ENTMCNC: 34.7 G/DL (ref 31–37)
MCV RBC AUTO: 88.7 FL (ref 80–100)
MIXED CELL %: 6.9 %
NEUTROPHILS # BLD AUTO: 5.9 X10ˆ3/UL (ref 1.5–7.7)
NEUTROPHILS NFR BLD AUTO: 76.5 %
NITRITE UR QL STRIP: NEGATIVE
PH UR STRIP: 6 [PH]
PLATELET # BLD AUTO: 272 X10ˆ3/UL (ref 150–450)
POCT INFLUENZA A: NEGATIVE
POCT INFLUENZA B: NEGATIVE
POTASSIUM BLD-SCNC: 3.5 MMOL/L (ref 3.6–5.1)
PROT UR STRIP-MCNC: NEGATIVE MG/DL
RBC # BLD AUTO: 4.42 X10ˆ6/UL
SARS-COV-2 RNA RESP QL NAA+PROBE: NOT DETECTED
SODIUM BLD-SCNC: 136 MMOL/L (ref 136–145)
SP GR UR STRIP: 1.01
UROBILINOGEN UR STRIP-ACNC: <2 MG/DL
WBC # BLD AUTO: 7.7 X10ˆ3/UL (ref 4–11)

## 2023-01-10 PROCEDURE — 81002 URINALYSIS NONAUTO W/O SCOPE: CPT | Performed by: NURSE PRACTITIONER

## 2023-01-10 PROCEDURE — 96374 THER/PROPH/DIAG INJ IV PUSH: CPT | Performed by: NURSE PRACTITIONER

## 2023-01-10 PROCEDURE — 85025 COMPLETE CBC W/AUTO DIFF WBC: CPT | Performed by: NURSE PRACTITIONER

## 2023-01-10 PROCEDURE — 81025 URINE PREGNANCY TEST: CPT | Performed by: NURSE PRACTITIONER

## 2023-01-10 PROCEDURE — U0002 COVID-19 LAB TEST NON-CDC: HCPCS | Performed by: NURSE PRACTITIONER

## 2023-01-10 PROCEDURE — 99214 OFFICE O/P EST MOD 30 MIN: CPT | Performed by: NURSE PRACTITIONER

## 2023-01-10 PROCEDURE — 96361 HYDRATE IV INFUSION ADD-ON: CPT | Performed by: NURSE PRACTITIONER

## 2023-01-10 PROCEDURE — 87502 INFLUENZA DNA AMP PROBE: CPT | Performed by: NURSE PRACTITIONER

## 2023-01-10 PROCEDURE — 80047 BASIC METABLC PNL IONIZED CA: CPT | Performed by: NURSE PRACTITIONER

## 2023-01-10 RX ORDER — DICYCLOMINE HYDROCHLORIDE 10 MG/5ML
20 SOLUTION ORAL ONCE
Status: COMPLETED | OUTPATIENT
Start: 2023-01-10 | End: 2023-01-10

## 2023-01-10 RX ORDER — DICYCLOMINE HCL 20 MG
20 TABLET ORAL 4 TIMES DAILY PRN
Qty: 20 TABLET | Refills: 0 | Status: SHIPPED | OUTPATIENT
Start: 2023-01-10

## 2023-01-10 RX ORDER — POTASSIUM CHLORIDE 20 MEQ/1
40 TABLET, EXTENDED RELEASE ORAL ONCE
Status: COMPLETED | OUTPATIENT
Start: 2023-01-10 | End: 2023-01-10

## 2023-01-10 RX ORDER — ONDANSETRON 4 MG/1
4 TABLET, ORALLY DISINTEGRATING ORAL EVERY 8 HOURS PRN
Qty: 10 TABLET | Refills: 0 | Status: SHIPPED | OUTPATIENT
Start: 2023-01-10

## 2023-01-10 RX ORDER — SODIUM CHLORIDE 9 MG/ML
1000 INJECTION, SOLUTION INTRAVENOUS ONCE
Status: COMPLETED | OUTPATIENT
Start: 2023-01-10 | End: 2023-01-10

## 2023-01-10 RX ORDER — POTASSIUM CHLORIDE 20 MEQ/1
40 TABLET, EXTENDED RELEASE ORAL DAILY
Status: DISCONTINUED | OUTPATIENT
Start: 2023-01-10 | End: 2023-01-10

## 2023-01-10 RX ORDER — ONDANSETRON 2 MG/ML
4 INJECTION INTRAMUSCULAR; INTRAVENOUS ONCE
Status: COMPLETED | OUTPATIENT
Start: 2023-01-10 | End: 2023-01-10

## 2023-01-10 NOTE — DISCHARGE INSTRUCTIONS
Follow-up with your primary care provider 2 to 3 days. Take over-the-counter Tylenol as needed for any pain or discomfort you may take the dicyclomine this is to help with stomach cramping and diarrhea take Zofran as needed for nausea or vomiting. Drink plenty of fluids follow a brat diet bananas rice applesauce toast crackers and advance as tolerated. Do not take any Imodium or Pepto-Bismol as this may temporarily stop the diarrhea and the diarrhea will resume after you stop taking this medication it is likely that you have a stomach virus and it needs to get out of your system. If you develop fever vomiting and cannot keep down oral hydration severe abdominal pain headache chest pain shortness of breath or notice blood in stool go to the nearest emergency department.

## 2023-03-01 ENCOUNTER — OFFICE VISIT (OUTPATIENT)
Dept: FAMILY MEDICINE CLINIC | Facility: CLINIC | Age: 38
End: 2023-03-01

## 2023-03-01 VITALS
WEIGHT: 171 LBS | SYSTOLIC BLOOD PRESSURE: 127 MMHG | BODY MASS INDEX: 29.19 KG/M2 | HEIGHT: 64 IN | HEART RATE: 71 BPM | DIASTOLIC BLOOD PRESSURE: 76 MMHG

## 2023-03-01 DIAGNOSIS — E66.3 OVERWEIGHT (BMI 25.0-29.9): Primary | ICD-10-CM

## 2023-03-01 PROCEDURE — 3074F SYST BP LT 130 MM HG: CPT | Performed by: PHYSICIAN ASSISTANT

## 2023-03-01 PROCEDURE — 99213 OFFICE O/P EST LOW 20 MIN: CPT | Performed by: PHYSICIAN ASSISTANT

## 2023-03-01 PROCEDURE — 3078F DIAST BP <80 MM HG: CPT | Performed by: PHYSICIAN ASSISTANT

## 2023-03-01 PROCEDURE — 3008F BODY MASS INDEX DOCD: CPT | Performed by: PHYSICIAN ASSISTANT

## 2023-03-23 ENCOUNTER — OFFICE VISIT (OUTPATIENT)
Dept: FAMILY MEDICINE CLINIC | Facility: CLINIC | Age: 38
End: 2023-03-23

## 2023-03-23 VITALS
RESPIRATION RATE: 18 BRPM | BODY MASS INDEX: 29.19 KG/M2 | OXYGEN SATURATION: 97 % | WEIGHT: 171 LBS | HEIGHT: 64 IN | SYSTOLIC BLOOD PRESSURE: 112 MMHG | DIASTOLIC BLOOD PRESSURE: 68 MMHG | HEART RATE: 69 BPM

## 2023-03-23 DIAGNOSIS — H01.9 EYELID INFLAMMATION: Primary | ICD-10-CM

## 2023-03-23 PROCEDURE — 3078F DIAST BP <80 MM HG: CPT | Performed by: FAMILY MEDICINE

## 2023-03-23 PROCEDURE — 3008F BODY MASS INDEX DOCD: CPT | Performed by: FAMILY MEDICINE

## 2023-03-23 PROCEDURE — 99213 OFFICE O/P EST LOW 20 MIN: CPT | Performed by: FAMILY MEDICINE

## 2023-03-23 PROCEDURE — 3074F SYST BP LT 130 MM HG: CPT | Performed by: FAMILY MEDICINE

## 2023-03-23 NOTE — PROGRESS NOTES
Subjective:   Patient ID: Bee Paul is a 40year old female. Eye Problem     patient has some eyelid dryness and itchiness and same with the lip   Had some redness but that's gone   Otherwise well      History/Other:   Review of Systems     Constitutional: Negative. Negative for activity change, appetite change, diaphoresis and fatigue. heent see hpi   Respiratory: Negative. Negative for apnea, cough, chest tightness and shortness of breath. Cardiovascular: Negative. Negative for chest pain, palpitations and leg swelling. Gastrointestinal: Negative. Negative for abdominal pain. Skin: Negative. No current outpatient medications on file. Allergies:  Amoxicillin             RASH  Ibuprofen               SHORTNESS OF BREATH    Objective:   Physical Exam  Constitutional:       Appearance: She is well-developed. Eyes:      Comments: Dryness eyelid   And upper lip   Cardiovascular:      Rate and Rhythm: Normal rate and regular rhythm. Heart sounds: Normal heart sounds. Pulmonary:      Effort: Pulmonary effort is normal.      Breath sounds: Normal breath sounds. Neurological:      Mental Status: She is alert. Deep Tendon Reflexes: Reflexes are normal and symmetric. Assessment & Plan:   Eyelid inflammation  (primary encounter diagnosis)  May apply small amount of hydrocortisone sparingly   F/u prn   No orders of the defined types were placed in this encounter.       Meds This Visit:  Requested Prescriptions      No prescriptions requested or ordered in this encounter       Imaging & Referrals:  None

## 2023-05-05 ENCOUNTER — LAB ENCOUNTER (OUTPATIENT)
Dept: LAB | Age: 38
End: 2023-05-05
Attending: FAMILY MEDICINE
Payer: COMMERCIAL

## 2023-05-05 ENCOUNTER — OFFICE VISIT (OUTPATIENT)
Dept: FAMILY MEDICINE CLINIC | Facility: CLINIC | Age: 38
End: 2023-05-05

## 2023-05-05 VITALS
HEIGHT: 64 IN | DIASTOLIC BLOOD PRESSURE: 68 MMHG | WEIGHT: 171 LBS | HEART RATE: 61 BPM | BODY MASS INDEX: 29.19 KG/M2 | SYSTOLIC BLOOD PRESSURE: 114 MMHG

## 2023-05-05 DIAGNOSIS — Z80.3 FAMILY HISTORY OF BREAST CANCER IN SISTER: ICD-10-CM

## 2023-05-05 DIAGNOSIS — L65.9 HAIR LOSS: ICD-10-CM

## 2023-05-05 DIAGNOSIS — E01.0 THYROMEGALY: ICD-10-CM

## 2023-05-05 DIAGNOSIS — R63.4 UNINTENTIONAL WEIGHT LOSS OF MORE THAN 5% BODY WEIGHT WITHIN 1 MONTH: Primary | ICD-10-CM

## 2023-05-05 DIAGNOSIS — R10.84 GENERALIZED ABDOMINAL PAIN: ICD-10-CM

## 2023-05-05 DIAGNOSIS — R63.4 UNINTENTIONAL WEIGHT LOSS OF MORE THAN 5% BODY WEIGHT WITHIN 1 MONTH: ICD-10-CM

## 2023-05-05 PROBLEM — G56.20: Status: RESOLVED | Noted: 2017-05-02 | Resolved: 2023-05-05

## 2023-05-05 PROBLEM — M62.830 LUMBAR PARASPINAL MUSCLE SPASM: Status: RESOLVED | Noted: 2020-01-21 | Resolved: 2023-05-05

## 2023-05-05 PROBLEM — T83.32XA IUD THREADS LOST: Status: RESOLVED | Noted: 2022-04-07 | Resolved: 2023-05-05

## 2023-05-05 PROBLEM — M54.59 MECHANICAL LOW BACK PAIN: Status: RESOLVED | Noted: 2020-01-21 | Resolved: 2023-05-05

## 2023-05-05 PROBLEM — S39.012A STRAIN OF LUMBAR PARASPINAL MUSCLE: Status: RESOLVED | Noted: 2020-01-21 | Resolved: 2023-05-05

## 2023-05-05 PROBLEM — M54.16 LEFT LUMBAR RADICULITIS: Status: RESOLVED | Noted: 2020-01-21 | Resolved: 2023-05-05

## 2023-05-05 PROBLEM — S60.212A CONTUSION OF LEFT WRIST: Status: RESOLVED | Noted: 2019-05-08 | Resolved: 2023-05-05

## 2023-05-05 PROBLEM — M79.10 MYALGIA: Status: RESOLVED | Noted: 2020-07-24 | Resolved: 2023-05-05

## 2023-05-05 PROBLEM — Z30.45 ENCOUNTER FOR SURVEILLANCE OF TRANSDERMAL PATCH HORMONAL CONTRACEPTIVE DEVICE: Status: RESOLVED | Noted: 2022-09-06 | Resolved: 2023-05-05

## 2023-05-05 PROBLEM — R29.898 LEFT ARM WEAKNESS: Status: RESOLVED | Noted: 2020-07-24 | Resolved: 2023-05-05

## 2023-05-05 PROBLEM — Z23 INFLUENZA VACCINE NEEDED: Status: RESOLVED | Noted: 2022-12-03 | Resolved: 2023-05-05

## 2023-05-05 LAB
ALBUMIN SERPL-MCNC: 3.8 G/DL (ref 3.4–5)
ALBUMIN/GLOB SERPL: 1.1 {RATIO} (ref 1–2)
ALP LIVER SERPL-CCNC: 64 U/L
ALT SERPL-CCNC: 20 U/L
ANION GAP SERPL CALC-SCNC: 6 MMOL/L (ref 0–18)
AST SERPL-CCNC: 14 U/L (ref 15–37)
BASOPHILS # BLD AUTO: 0.03 X10(3) UL (ref 0–0.2)
BASOPHILS NFR BLD AUTO: 0.4 %
BILIRUB SERPL-MCNC: 0.3 MG/DL (ref 0.1–2)
BILIRUB UR QL: NEGATIVE
BUN BLD-MCNC: 15 MG/DL (ref 7–18)
BUN/CREAT SERPL: 23.1 (ref 10–20)
CALCIUM BLD-MCNC: 8.9 MG/DL (ref 8.5–10.1)
CHLORIDE SERPL-SCNC: 106 MMOL/L (ref 98–112)
CLARITY UR: CLEAR
CO2 SERPL-SCNC: 25 MMOL/L (ref 21–32)
CREAT BLD-MCNC: 0.65 MG/DL
CRP SERPL-MCNC: <0.29 MG/DL (ref ?–0.3)
DEPRECATED RDW RBC AUTO: 40.2 FL (ref 35.1–46.3)
EOSINOPHIL # BLD AUTO: 0.08 X10(3) UL (ref 0–0.7)
EOSINOPHIL NFR BLD AUTO: 1.1 %
ERYTHROCYTE [DISTWIDTH] IN BLOOD BY AUTOMATED COUNT: 11.9 % (ref 11–15)
ERYTHROCYTE [SEDIMENTATION RATE] IN BLOOD: 19 MM/HR
EST. AVERAGE GLUCOSE BLD GHB EST-MCNC: 105 MG/DL (ref 68–126)
FASTING STATUS PATIENT QL REPORTED: YES
GFR SERPLBLD BASED ON 1.73 SQ M-ARVRAT: 116 ML/MIN/1.73M2 (ref 60–?)
GLOBULIN PLAS-MCNC: 3.6 G/DL (ref 2.8–4.4)
GLUCOSE BLD-MCNC: 99 MG/DL (ref 70–99)
GLUCOSE UR-MCNC: NORMAL MG/DL
HBA1C MFR BLD: 5.3 % (ref ?–5.7)
HCT VFR BLD AUTO: 39.1 %
HGB BLD-MCNC: 13.3 G/DL
IMM GRANULOCYTES # BLD AUTO: 0.01 X10(3) UL (ref 0–1)
IMM GRANULOCYTES NFR BLD: 0.1 %
KETONES UR-MCNC: NEGATIVE MG/DL
LEUKOCYTE ESTERASE UR QL STRIP.AUTO: NEGATIVE
LYMPHOCYTES # BLD AUTO: 3.04 X10(3) UL (ref 1–4)
LYMPHOCYTES NFR BLD AUTO: 40.9 %
MCH RBC QN AUTO: 31.1 PG (ref 26–34)
MCHC RBC AUTO-ENTMCNC: 34 G/DL (ref 31–37)
MCV RBC AUTO: 91.6 FL
MONOCYTES # BLD AUTO: 0.4 X10(3) UL (ref 0.1–1)
MONOCYTES NFR BLD AUTO: 5.4 %
NEUTROPHILS # BLD AUTO: 3.88 X10 (3) UL (ref 1.5–7.7)
NEUTROPHILS # BLD AUTO: 3.88 X10(3) UL (ref 1.5–7.7)
NEUTROPHILS NFR BLD AUTO: 52.1 %
NITRITE UR QL STRIP.AUTO: NEGATIVE
OSMOLALITY SERPL CALC.SUM OF ELEC: 285 MOSM/KG (ref 275–295)
PH UR: 6 [PH] (ref 5–8)
PLATELET # BLD AUTO: 274 10(3)UL (ref 150–450)
POTASSIUM SERPL-SCNC: 3.7 MMOL/L (ref 3.5–5.1)
PROT SERPL-MCNC: 7.4 G/DL (ref 6.4–8.2)
PROT UR-MCNC: NEGATIVE MG/DL
RBC # BLD AUTO: 4.27 X10(6)UL
SODIUM SERPL-SCNC: 137 MMOL/L (ref 136–145)
SP GR UR STRIP: 1.02 (ref 1–1.03)
TSI SER-ACNC: 0.74 MIU/ML (ref 0.36–3.74)
UROBILINOGEN UR STRIP-ACNC: NORMAL
WBC # BLD AUTO: 7.4 X10(3) UL (ref 4–11)

## 2023-05-05 PROCEDURE — 85025 COMPLETE CBC W/AUTO DIFF WBC: CPT

## 2023-05-05 PROCEDURE — 3078F DIAST BP <80 MM HG: CPT | Performed by: FAMILY MEDICINE

## 2023-05-05 PROCEDURE — 85652 RBC SED RATE AUTOMATED: CPT

## 2023-05-05 PROCEDURE — 83036 HEMOGLOBIN GLYCOSYLATED A1C: CPT

## 2023-05-05 PROCEDURE — 84443 ASSAY THYROID STIM HORMONE: CPT

## 2023-05-05 PROCEDURE — 3008F BODY MASS INDEX DOCD: CPT | Performed by: FAMILY MEDICINE

## 2023-05-05 PROCEDURE — 36415 COLL VENOUS BLD VENIPUNCTURE: CPT

## 2023-05-05 PROCEDURE — 80053 COMPREHEN METABOLIC PANEL: CPT

## 2023-05-05 PROCEDURE — 3074F SYST BP LT 130 MM HG: CPT | Performed by: FAMILY MEDICINE

## 2023-05-05 PROCEDURE — 86140 C-REACTIVE PROTEIN: CPT

## 2023-05-05 PROCEDURE — 81001 URINALYSIS AUTO W/SCOPE: CPT

## 2023-05-05 PROCEDURE — 99214 OFFICE O/P EST MOD 30 MIN: CPT | Performed by: FAMILY MEDICINE

## 2023-05-05 RX ORDER — OMEPRAZOLE 20 MG/1
20 CAPSULE, DELAYED RELEASE ORAL
COMMUNITY

## 2023-05-06 DIAGNOSIS — R63.4 WEIGHT LOSS, UNINTENTIONAL: ICD-10-CM

## 2023-05-06 DIAGNOSIS — R31.29 PERSISTENT MICROSCOPIC HEMATURIA: Primary | ICD-10-CM

## 2023-05-09 ENCOUNTER — OFFICE VISIT (OUTPATIENT)
Dept: FAMILY MEDICINE CLINIC | Facility: CLINIC | Age: 38
End: 2023-05-09

## 2023-05-09 VITALS
BODY MASS INDEX: 30.05 KG/M2 | HEART RATE: 76 BPM | SYSTOLIC BLOOD PRESSURE: 119 MMHG | WEIGHT: 176 LBS | HEIGHT: 64 IN | DIASTOLIC BLOOD PRESSURE: 73 MMHG

## 2023-05-09 DIAGNOSIS — R30.0 BURNING WITH URINATION: Primary | ICD-10-CM

## 2023-05-09 DIAGNOSIS — N39.46 MIXED INCONTINENCE: ICD-10-CM

## 2023-05-09 DIAGNOSIS — R31.9 HEMATURIA, UNSPECIFIED TYPE: ICD-10-CM

## 2023-05-09 LAB
APPEARANCE: CLEAR
BILIRUBIN: NEGATIVE
GLUCOSE (URINE DIPSTICK): NEGATIVE MG/DL
KETONES (URINE DIPSTICK): NEGATIVE MG/DL
LEUKOCYTES: NEGATIVE
MULTISTIX LOT#: ABNORMAL NUMERIC
NITRITE, URINE: NEGATIVE
PH, URINE: 7 (ref 4.5–8)
PROTEIN (URINE DIPSTICK): NEGATIVE MG/DL
SPECIFIC GRAVITY: 1.01 (ref 1–1.03)
URINE-COLOR: YELLOW
UROBILINOGEN,SEMI-QN: 0.2 MG/DL (ref 0–1.9)

## 2023-05-09 PROCEDURE — 3074F SYST BP LT 130 MM HG: CPT | Performed by: FAMILY MEDICINE

## 2023-05-09 PROCEDURE — 81003 URINALYSIS AUTO W/O SCOPE: CPT | Performed by: FAMILY MEDICINE

## 2023-05-09 PROCEDURE — 99213 OFFICE O/P EST LOW 20 MIN: CPT | Performed by: FAMILY MEDICINE

## 2023-05-09 PROCEDURE — 3008F BODY MASS INDEX DOCD: CPT | Performed by: FAMILY MEDICINE

## 2023-05-09 PROCEDURE — 3078F DIAST BP <80 MM HG: CPT | Performed by: FAMILY MEDICINE

## 2023-05-09 RX ORDER — FLUCONAZOLE 150 MG/1
150 TABLET ORAL ONCE
Qty: 1 TABLET | Refills: 1 | Status: SHIPPED | OUTPATIENT
Start: 2023-05-09 | End: 2023-05-09

## 2023-05-09 NOTE — PROGRESS NOTES
Blood pressure 119/73, pulse 76, height 5' 4\" (1.626 m), weight 176 lb (79.8 kg), last menstrual period 04/20/2023, not currently breastfeeding. Presents today complaining of chronic urinary incontinence. She also reports that she has burning with urination. She has itching and vaginal discharge. She is requesting a prescription for a yeast infection. She has exams scheduled as ordered by another physician she would like to follow-up with a Slovenian-speaking doctor. Objective patient is comfortable no apparent distress    Urine dipstick positive for hematuria    Assessment dysuria with hematuria also vaginitis    Plan Diflucan prescription urine sent for micro culture and referral to urology.

## 2023-05-17 ENCOUNTER — GENETICS ENCOUNTER (OUTPATIENT)
Dept: GENETICS | Facility: HOSPITAL | Age: 38
End: 2023-05-17
Attending: GENETIC COUNSELOR, MS
Payer: COMMERCIAL

## 2023-05-17 ENCOUNTER — APPOINTMENT (OUTPATIENT)
Dept: HEMATOLOGY/ONCOLOGY | Facility: HOSPITAL | Age: 38
End: 2023-05-17
Attending: GENETIC COUNSELOR, MS
Payer: COMMERCIAL

## 2023-05-17 DIAGNOSIS — Z80.42 FAMILY HISTORY OF MALIGNANT NEOPLASM OF PROSTATE: ICD-10-CM

## 2023-05-17 DIAGNOSIS — Z80.0 FAMILY HISTORY OF COLON CANCER: ICD-10-CM

## 2023-05-17 DIAGNOSIS — Z80.3 FAMILY HISTORY OF BREAST CANCER: Primary | ICD-10-CM

## 2023-05-17 PROCEDURE — 96040 HC GENETIC COUNSELING EA 30 MIN: CPT | Performed by: GENETIC COUNSELOR, MS

## 2023-05-18 ENCOUNTER — HOSPITAL ENCOUNTER (OUTPATIENT)
Dept: GENERAL RADIOLOGY | Age: 38
Discharge: HOME | End: 2023-05-18
Attending: FAMILY MEDICINE
Payer: COMMERCIAL

## 2023-05-18 ENCOUNTER — HOSPITAL ENCOUNTER (OUTPATIENT)
Dept: ULTRASOUND IMAGING | Age: 38
Discharge: HOME | End: 2023-05-18
Attending: FAMILY MEDICINE
Payer: COMMERCIAL

## 2023-05-18 DIAGNOSIS — E01.0 THYROMEGALY: ICD-10-CM

## 2023-05-18 DIAGNOSIS — R63.4 UNINTENTIONAL WEIGHT LOSS OF MORE THAN 5% BODY WEIGHT WITHIN 1 MONTH: ICD-10-CM

## 2023-05-18 DIAGNOSIS — L65.9 HAIR LOSS: ICD-10-CM

## 2023-05-18 PROCEDURE — 76536 US EXAM OF HEAD AND NECK: CPT | Performed by: FAMILY MEDICINE

## 2023-05-18 PROCEDURE — 71046 X-RAY EXAM CHEST 2 VIEWS: CPT | Performed by: FAMILY MEDICINE

## 2023-05-19 ENCOUNTER — HOSPITAL ENCOUNTER (OUTPATIENT)
Dept: ULTRASOUND IMAGING | Facility: HOSPITAL | Age: 38
Discharge: HOME | End: 2023-05-19
Attending: FAMILY MEDICINE
Payer: COMMERCIAL

## 2023-05-19 ENCOUNTER — HOSPITAL ENCOUNTER (OUTPATIENT)
Dept: MAMMOGRAPHY | Facility: HOSPITAL | Age: 38
Discharge: HOME | End: 2023-05-19
Attending: FAMILY MEDICINE
Payer: COMMERCIAL

## 2023-05-19 DIAGNOSIS — Z80.3 FAMILY HISTORY OF BREAST CANCER IN SISTER: ICD-10-CM

## 2023-05-19 PROCEDURE — 77062 BREAST TOMOSYNTHESIS BI: CPT | Performed by: FAMILY MEDICINE

## 2023-05-19 PROCEDURE — 76641 ULTRASOUND BREAST COMPLETE: CPT | Performed by: FAMILY MEDICINE

## 2023-05-19 PROCEDURE — 77066 DX MAMMO INCL CAD BI: CPT | Performed by: FAMILY MEDICINE

## 2023-05-23 DIAGNOSIS — Z80.3 FAMILY HISTORY OF BREAST CANCER IN FIRST DEGREE RELATIVE: Primary | ICD-10-CM

## 2023-05-23 DIAGNOSIS — R92.2 DENSE BREAST TISSUE ON MAMMOGRAM: ICD-10-CM

## 2023-05-24 ENCOUNTER — TELEPHONE (OUTPATIENT)
Dept: GENETICS | Facility: HOSPITAL | Age: 38
End: 2023-05-24

## 2023-05-24 NOTE — TELEPHONE ENCOUNTER
MEIR for [employer] [facility], #[id number]) that her genetic testing has been approved and she should call back to schedule appointment for blood draw.

## 2023-06-05 ENCOUNTER — NURSE ONLY (OUTPATIENT)
Dept: HEMATOLOGY/ONCOLOGY | Facility: HOSPITAL | Age: 38
End: 2023-06-05
Attending: GENETIC COUNSELOR, MS
Payer: COMMERCIAL

## 2023-06-05 PROCEDURE — 36415 COLL VENOUS BLD VENIPUNCTURE: CPT

## 2023-06-06 ENCOUNTER — HOSPITAL ENCOUNTER (OUTPATIENT)
Dept: ULTRASOUND IMAGING | Age: 38
Discharge: HOME | End: 2023-06-06
Attending: FAMILY MEDICINE
Payer: COMMERCIAL

## 2023-06-06 DIAGNOSIS — R31.29 PERSISTENT MICROSCOPIC HEMATURIA: ICD-10-CM

## 2023-06-06 DIAGNOSIS — R63.4 WEIGHT LOSS, UNINTENTIONAL: ICD-10-CM

## 2023-06-06 PROCEDURE — 76770 US EXAM ABDO BACK WALL COMP: CPT | Performed by: FAMILY MEDICINE

## 2023-06-15 ENCOUNTER — HOSPITAL ENCOUNTER (OUTPATIENT)
Dept: MRI IMAGING | Facility: HOSPITAL | Age: 38
Discharge: HOME | End: 2023-06-15
Attending: FAMILY MEDICINE
Payer: COMMERCIAL

## 2023-06-15 DIAGNOSIS — R92.2 DENSE BREAST TISSUE ON MAMMOGRAM: ICD-10-CM

## 2023-06-15 DIAGNOSIS — Z80.3 FAMILY HISTORY OF BREAST CANCER IN FIRST DEGREE RELATIVE: ICD-10-CM

## 2023-06-15 PROCEDURE — 77049 MRI BREAST C-+ W/CAD BI: CPT | Performed by: FAMILY MEDICINE

## 2023-06-15 PROCEDURE — A9575 INJ GADOTERATE MEGLUMI 0.1ML: HCPCS | Performed by: FAMILY MEDICINE

## 2023-06-15 RX ORDER — GADOTERATE MEGLUMINE 376.9 MG/ML
20 INJECTION INTRAVENOUS
Status: COMPLETED | OUTPATIENT
Start: 2023-06-15 | End: 2023-06-15

## 2023-06-15 RX ADMIN — GADOTERATE MEGLUMINE 16 ML: 376.9 INJECTION INTRAVENOUS at 13:35:00

## 2023-06-16 ENCOUNTER — OFFICE VISIT (OUTPATIENT)
Dept: SURGERY | Facility: CLINIC | Age: 38
End: 2023-06-16

## 2023-06-16 ENCOUNTER — TELEPHONE (OUTPATIENT)
Dept: FAMILY MEDICINE CLINIC | Facility: CLINIC | Age: 38
End: 2023-06-16

## 2023-06-16 DIAGNOSIS — N63.11 MASS OF UPPER OUTER QUADRANT OF RIGHT BREAST: Primary | ICD-10-CM

## 2023-06-16 DIAGNOSIS — N39.46 MIXED INCONTINENCE: ICD-10-CM

## 2023-06-16 DIAGNOSIS — R31.29 MICROSCOPIC HEMATURIA: Primary | ICD-10-CM

## 2023-06-16 PROCEDURE — 99204 OFFICE O/P NEW MOD 45 MIN: CPT | Performed by: UROLOGY

## 2023-06-16 NOTE — TELEPHONE ENCOUNTER
Patient is not [employer] active. See Stephanie's note below, patient is aware that we are still waiting for Dr Kevin Cook to review the test results. Encounter closed. Test results now post immediately to your [employer] account. However, your doctor may not have the chance to review or provide comments on them before the results reach you. Our providers review and comment on all test results, normal or otherwise. If you have not heard from our office with comments on your test results within the next 3-4 days, please contact us again on this message string so we can assist you.

## 2023-06-16 NOTE — TELEPHONE ENCOUNTER
Patient is requesting a call back to go over her MRI results.  Patient is aware Dr. Dorys Davis has not reviewed the results and an RN will call her once she has reviewed them

## 2023-06-19 ENCOUNTER — TELEPHONE (OUTPATIENT)
Dept: GENETICS | Facility: HOSPITAL | Age: 38
End: 2023-06-19

## 2023-06-19 NOTE — TELEPHONE ENCOUNTER
Spoke with Pardeep Dickinson over phone to discuss her genetic testing results. Used a phone  Niya Louis, #[phone]) as she speaks [place] 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 She opted for 84 gene panel+RNA through [facility] ([employer]e Multi-Cancer Panel+RNA). She was pleased to hear these results. I will mail her a copy of them. All her questions were answered to the best of my ability and she was appreciative of the call.

## 2023-06-30 ENCOUNTER — OFFICE VISIT (OUTPATIENT)
Dept: FAMILY MEDICINE CLINIC | Facility: CLINIC | Age: 38
End: 2023-06-30

## 2023-06-30 VITALS
SYSTOLIC BLOOD PRESSURE: 122 MMHG | HEART RATE: 72 BPM | WEIGHT: 180 LBS | HEIGHT: 64 IN | BODY MASS INDEX: 30.73 KG/M2 | DIASTOLIC BLOOD PRESSURE: 64 MMHG

## 2023-06-30 DIAGNOSIS — N63.21 MASS OF UPPER OUTER QUADRANT OF LEFT BREAST: Primary | ICD-10-CM

## 2023-06-30 PROBLEM — R63.4: Status: RESOLVED | Noted: 2023-05-05 | Resolved: 2023-06-30

## 2023-06-30 PROCEDURE — 3008F BODY MASS INDEX DOCD: CPT | Performed by: FAMILY MEDICINE

## 2023-06-30 PROCEDURE — 3078F DIAST BP <80 MM HG: CPT | Performed by: FAMILY MEDICINE

## 2023-06-30 PROCEDURE — 99214 OFFICE O/P EST MOD 30 MIN: CPT | Performed by: FAMILY MEDICINE

## 2023-06-30 PROCEDURE — 3074F SYST BP LT 130 MM HG: CPT | Performed by: FAMILY MEDICINE

## 2023-07-13 ENCOUNTER — HOSPITAL ENCOUNTER (OUTPATIENT)
Dept: ULTRASOUND IMAGING | Facility: HOSPITAL | Age: 38
Discharge: HOME | End: 2023-07-13
Attending: FAMILY MEDICINE
Payer: COMMERCIAL

## 2023-07-13 ENCOUNTER — TELEPHONE (OUTPATIENT)
Dept: FAMILY MEDICINE CLINIC | Facility: CLINIC | Age: 38
End: 2023-07-13

## 2023-07-13 ENCOUNTER — HOSPITAL ENCOUNTER (OUTPATIENT)
Dept: MAMMOGRAPHY | Facility: HOSPITAL | Age: 38
Discharge: HOME | End: 2023-07-13
Attending: FAMILY MEDICINE
Payer: COMMERCIAL

## 2023-07-13 DIAGNOSIS — R92.8 ABNORMAL MAMMOGRAM: ICD-10-CM

## 2023-07-13 PROCEDURE — 77061 BREAST TOMOSYNTHESIS UNI: CPT | Performed by: FAMILY MEDICINE

## 2023-07-13 PROCEDURE — 76642 ULTRASOUND BREAST LIMITED: CPT | Performed by: FAMILY MEDICINE

## 2023-07-13 PROCEDURE — 77065 DX MAMMO INCL CAD UNI: CPT | Performed by: FAMILY MEDICINE

## 2023-07-13 NOTE — TELEPHONE ENCOUNTER
Patient is requesting to go over her mammogram results. Patient is still experiencing pain.  Please advise

## 2023-07-13 NOTE — TELEPHONE ENCOUNTER
Was seen in the office on 6/30/23 with Dr Joseph Crenshaw due to breast pain. SEE IMAGING 7/13/23; ;  Mammogram and US left breast done today with official results.          Future Appointments   Date Time Provider Gil Walls   7/17/2023 11:00 AM Shobha Chauhan MD [facility]

## 2023-07-14 ENCOUNTER — OFFICE VISIT (OUTPATIENT)
Dept: FAMILY MEDICINE CLINIC | Facility: CLINIC | Age: 38
End: 2023-07-14

## 2023-07-14 ENCOUNTER — TELEPHONE (OUTPATIENT)
Dept: FAMILY MEDICINE CLINIC | Facility: CLINIC | Age: 38
End: 2023-07-14

## 2023-07-14 VITALS
HEIGHT: 64 IN | WEIGHT: 172 LBS | DIASTOLIC BLOOD PRESSURE: 68 MMHG | HEART RATE: 78 BPM | TEMPERATURE: 98 F | SYSTOLIC BLOOD PRESSURE: 131 MMHG | OXYGEN SATURATION: 99 % | BODY MASS INDEX: 29.37 KG/M2

## 2023-07-14 DIAGNOSIS — N64.4 BREAST PAIN IN FEMALE: Primary | ICD-10-CM

## 2023-07-14 DIAGNOSIS — N60.12 FIBROCYSTIC CHANGES OF LEFT BREAST: ICD-10-CM

## 2023-07-14 DIAGNOSIS — N60.11 FIBROCYSTIC BREAST CHANGES, BILATERAL: ICD-10-CM

## 2023-07-14 DIAGNOSIS — N60.12 FIBROCYSTIC BREAST CHANGES, BILATERAL: ICD-10-CM

## 2023-07-14 PROCEDURE — 3008F BODY MASS INDEX DOCD: CPT | Performed by: FAMILY MEDICINE

## 2023-07-14 PROCEDURE — 3075F SYST BP GE 130 - 139MM HG: CPT | Performed by: FAMILY MEDICINE

## 2023-07-14 PROCEDURE — 99214 OFFICE O/P EST MOD 30 MIN: CPT | Performed by: FAMILY MEDICINE

## 2023-07-14 PROCEDURE — 3078F DIAST BP <80 MM HG: CPT | Performed by: FAMILY MEDICINE

## 2023-07-14 RX ORDER — ACETAMINOPHEN AND CODEINE PHOSPHATE 300; 30 MG/1; MG/1
1 TABLET ORAL NIGHTLY PRN
Qty: 30 TABLET | Refills: 0 | Status: SHIPPED | OUTPATIENT
Start: 2023-07-14

## 2023-07-14 NOTE — PROGRESS NOTES
Subjective:   Patient ID: Cliff aTpia is a 45year old female. HPI  Patient presents with:  Test Results: Pt presents for follow up for mammogram results,  Pain: Having more persistent pain with cyst on breast, needs to know what's the next step   Has had mammogram, MRI , US  History/Other:   Review of Systems   Constitutional: Negative. Genitourinary:         Breast pain     Current Outpatient Medications   Medication Sig Dispense Refill    acetaminophen-codeine (TYLENOL WITH CODEINE #3) 300-30 MG Oral Tab Take 1 tablet by mouth nightly as needed for Pain. 30 tablet 0    omeprazole 20 MG Oral Capsule Delayed Release Take 1 capsule (20 mg total) by mouth every morning before breakfast.       Allergies:  Amoxicillin             RASH  Ibuprofen               SHORTNESS OF BREATH    Objective:   Physical Exam  Vitals reviewed. Chest:      Comments: Reported left side breast pain. Worse with movement of arm. Able to use arm. Assessment & Plan:   Breast pain in female  (primary encounter diagnosis)  Fibrocystic changes of left breast  Fibrocystic breast changes, bilateral    Briefed on results of test.  Recommend decrease coffee and caffeine intake to once daily. Advil or motrin as needed. Moist heat as needed. See breast surgeon. They agree. No orders of the defined types were placed in this encounter. Meds This Visit:  Requested Prescriptions     Signed Prescriptions Disp Refills    acetaminophen-codeine (TYLENOL WITH CODEINE #3) 300-30 MG Oral Tab 30 tablet 0     Sig: Take 1 tablet by mouth nightly as needed for Pain.        Imaging & Referrals:  SURGERY - INTERNAL

## 2023-07-14 NOTE — TELEPHONE ENCOUNTER
[place] Language Line: Kwame ID # [id number]. Patient called, verified Name and . She is following up regarding test below. Relayed Dr. Mehrdad Horan' message below, verbalized understanding and had no further questions at this time. She will await office's callback     SHANE MINNA 2D+3D DIAGNOSTIC SHANE LEFT (CPT=77065/61806): Result Notes     Elnor Bosworth, DO  2023  6:32 PM CDT       Please clarify with radiology if follow up mammogram is recommended in 10 months. Spoke to [employer], verified patient's Name and . Clarification regarding recommendation verified. Per Diagnostic Mammo Left  - Routine mammogram and clinical evaluation in 10 months (May 2024). However, Diagnostic Mammo Bilateral  also recommends - Short term follow-up ultrasound left breast in 6 months (2023). Dr. Иван Lynn.

## 2023-07-17 ENCOUNTER — PROCEDURE (OUTPATIENT)
Dept: SURGERY | Facility: CLINIC | Age: 38
End: 2023-07-17

## 2023-07-17 VITALS
HEART RATE: 69 BPM | WEIGHT: 172 LBS | DIASTOLIC BLOOD PRESSURE: 72 MMHG | SYSTOLIC BLOOD PRESSURE: 121 MMHG | BODY MASS INDEX: 30 KG/M2

## 2023-07-17 DIAGNOSIS — R31.29 MICROSCOPIC HEMATURIA: Primary | ICD-10-CM

## 2023-07-17 DIAGNOSIS — N39.41 URGENCY INCONTINENCE: ICD-10-CM

## 2023-07-17 PROCEDURE — 52000 CYSTOURETHROSCOPY: CPT | Performed by: UROLOGY

## 2023-07-17 PROCEDURE — 3078F DIAST BP <80 MM HG: CPT | Performed by: UROLOGY

## 2023-07-17 PROCEDURE — 3074F SYST BP LT 130 MM HG: CPT | Performed by: UROLOGY

## 2023-07-17 NOTE — PROCEDURES
CYSTOSCOPY (FEMALE)    PRE-OP DIAGNOSIS: microhematuria    POST-OP DIAGNOSIS: same    PROCEDURE: Cystsocopy    SURGEON: Beba Wise MD    ASSISTANT: none     EBL: minimal    FINDINGS:   Urethra: No urethral lesions, no urethral strictures  Bladder: Bilateral ureteral orifices in orthotopic position with efflux, no suspicious or concerning erythematous lesions, no papillary bladder tumors, no stones   Retroflexion: no abnormalities   Other findings: no stress incontinence with bladder full and patient coughing. With extreme straining, mild incontinence    INDICATIONS: Microhematuria. Urinary incontinence. Kidney ultrasound negative. Urine culture negative. Voiding diary with coffee x 2, beer x 2 on . 5x voids - did not mention if any urgency. PROCEDURE: Patient was brought to the procedure suite and a timeout was performed identifying the patient,  and procedure being performed. The risks of the procedure were once again detailed to the patient including bleeding, infection, dysuria. The patient agreed to proceed. The patient had a negative urinalysis. No antibiotics were given to patient prior to this procedure. She was placed in a supine position on the table and a flexible cystoscope was inserted per urethra. There were no obvious urethral lesions or strictures. Once in the bladder we performed a full diagnostic/surveillance cystoscopy which demonstrated no abnormalities. On retroflexion we noted no abnormalities. The scope was then carefully removed and once again no urethral abnormalities were noted. There were no complications after this procedure and the patient tolerated the procedure without issue.     IMPRESSION: Cystoscopy negative    Behavioral management with timed voiding, double voiding, avoiding bladder irritants (coffee, tea, soda/pop, alcohol), voiding prior to bedtime, avoiding fluids 2-4 hours prior to bedtime, compression stockings and elevation of feet Mirabegron 50mg daily x 3 months. Rtc after that time. She knows to take daily and that it is associated with hypertension.     PLAN:    Patient complains of frequency, recommended behavioral management and mirabegron

## 2023-10-04 ENCOUNTER — OFFICE VISIT (OUTPATIENT)
Dept: SURGERY | Facility: CLINIC | Age: 38
End: 2023-10-04
Payer: COMMERCIAL

## 2023-10-04 VITALS
OXYGEN SATURATION: 98 % | HEIGHT: 64 IN | TEMPERATURE: 98 F | RESPIRATION RATE: 16 BRPM | WEIGHT: 189.81 LBS | BODY MASS INDEX: 32.41 KG/M2 | SYSTOLIC BLOOD PRESSURE: 124 MMHG | DIASTOLIC BLOOD PRESSURE: 81 MMHG | HEART RATE: 62 BPM

## 2023-10-04 DIAGNOSIS — M79.622 AXILLARY PAIN, LEFT: ICD-10-CM

## 2023-10-04 DIAGNOSIS — N60.02 BENIGN CYST OF LEFT BREAST: Primary | ICD-10-CM

## 2023-10-04 DIAGNOSIS — R92.8 ABNORMAL MRI, BREAST: ICD-10-CM

## 2023-10-04 PROCEDURE — 3008F BODY MASS INDEX DOCD: CPT | Performed by: SURGERY

## 2023-10-04 PROCEDURE — 3074F SYST BP LT 130 MM HG: CPT | Performed by: SURGERY

## 2023-10-04 PROCEDURE — 76642 ULTRASOUND BREAST LIMITED: CPT | Performed by: SURGERY

## 2023-10-04 PROCEDURE — 99204 OFFICE O/P NEW MOD 45 MIN: CPT | Performed by: SURGERY

## 2023-10-04 PROCEDURE — 3079F DIAST BP 80-89 MM HG: CPT | Performed by: SURGERY

## 2023-10-30 ENCOUNTER — OFFICE VISIT (OUTPATIENT)
Dept: SURGERY | Facility: CLINIC | Age: 38
End: 2023-10-30

## 2023-10-30 DIAGNOSIS — R31.29 MICROSCOPIC HEMATURIA: ICD-10-CM

## 2023-10-30 DIAGNOSIS — N39.41 URGENCY INCONTINENCE: Primary | ICD-10-CM

## 2023-10-30 PROCEDURE — 99213 OFFICE O/P EST LOW 20 MIN: CPT | Performed by: UROLOGY

## 2023-10-30 NOTE — PROGRESS NOTES
Sandy Wolf MD  Department of Urology  [facility]  Mehrdad Stubbs    T: [phone]  F: [phone]    To: Rose Dias DO   [address] [place] [address]     Re: Sonya Long   MRN: [id number]  : 5/3/1985    Dear Mary Bruce. DO [place],    Today I had the pleasure of seeing Sonya Long in my clinic. As you know, Ms. Mee Rodriguez is a pleasant 45year old year old female who I am seeing for follow-up of lower urinary tract symptoms. Patient was last seen in this department on 2023. Briefly, patient was seen by me in 2023. She is G2, P2 (1 , 1 ) who has had urinary incontinence with urgency, frequency and occasional coughing and laughing. She also had microscopic hematuria and had a renal bladder ultrasound that was negative. She also had a cystoscopy that demonstrated no abnormalities. I did not see any stress incontinence with her bladder full and patient coughing. Did see mild incontinence with extreme straining. Her voiding diary demonstrated 2 coffees, 2 beers a week and 5 voids. It did not mention any urgency. As she complained of urgency and frequency I recommended mirabegron 50 mg. Today she states that the mirabegron has helped her urgency and frequency slightly. She still has leakage of urine with sexual activity. She wears 2-3 pads a day that are wet when she changes them     PAST MEDICAL HISTORY:  Past Medical History:   Diagnosis Date    Influenza 2019    IUD threads lost 2022    Numbness of left foot 2020    Palpitations 2015    Unintentional weight loss of more than 5% body weight within 1 month 2023    Reported 30lb weight loss in the last 1mo or so. Only associated symptom is hair loss and some intermittent LUQ abdominal pain that radiates to the L back. No other associated symptoms      Pt uptodate on cervcial cancer screening. Overdue for mammogram -ordered   Pt with significant family hx of breast cancer in sister x 2 in their 45s and paternal cousin dx with metastatic colon cancer at the        PAST SURGICAL HISTORY:  Past Surgical History:   Procedure Laterality Date          CYST ASPIRATION LEFT  2016    ELECTROCARDIOGRAM, COMPLETE  2012    scanned to media         ALLERGIES:    Amoxicillin             RASH  Ibuprofen               SHORTNESS OF BREATH      MEDICATIONS:  Current Outpatient Medications   Medication Instructions    acetaminophen-codeine (TYLENOL WITH CODEINE #3) 300-30 MG Oral Tab 1 tablet, Oral, Nightly PRN    Mirabegron ER (MYRBETRIQ) 50 mg, Oral, Daily    omeprazole (PRILOSEC) 20 mg, Oral, Every morning before breakfast        FAMILY HISTORY:  Family History   Problem Relation Age of Onset    Cancer Mother 43        brain cancer    Cancer Father 72        prostate ca    Migraines Sister     Breast Cancer Sister 35    Cancer Maternal Grandmother         brain cancer    Breast Cancer Paternal Cousin Female         age 45s    Breast Cancer Paternal Cousin Female         age 45s    Colon Cancer Paternal Cousin Male 39    Breast Cancer Half-Sister 40    Prostate Cancer Maternal Uncle     Prostate Cancer Maternal Uncle         SOCIAL HISTORY:  Social History     Socioeconomic History    Marital status:    Tobacco Use    Smoking status: Never    Smokeless tobacco: Never   Vaping Use    Vaping Use: Never used   Substance and Sexual Activity    Alcohol use: Yes     Comment: rarely    Drug use: No    Sexual activity: Yes     Partners: Male     Birth control/protection: Implant     Comment: nexplanon placed    Other Topics Concern    Caffeine Concern No          PHYSICAL EXAMINATION:  There were no vitals filed for this visit.   CONSTITUTIONAL: No apparent distress, cooperative and communicative  NEUROLOGIC: Alert and oriented   HEAD: Normocephalic, atraumatic   EYES: Sclera non-icteric   ENT: Hearing intact, moist mucous membranes   NECK: No obvious goiter or masses   RESPIRATORY: Normal respiratory effort, Nonlabored breathing on room air  SKIN: No evident rashes   ABDOMEN: Soft, nontender, nondistended, no rebound tenderness, no guarding, no masses      REVIEW OF SYSTEMS:    A comprehensive 10-point review of systems was completed. Pertinent positives and negatives are noted in the the HPI. LABORATORY DATA:  RINE CULTURE No Growth 2 Days           Resulting Agency: [facility])                  Glucose Urine  Negative mg/dL Negative   Bilirubin Urine  Negative Negative   Ketones, UA  Negative - Trace mg/dL Negative   Spec Gravity  1.005 - 1.030 1.015   Blood Urine  Negative Small Abnormal    PH Urine  5.0 - 8.0 7.0   Protein Urine  Negative - Trace mg/dL Negative   Urobilinogen Urine  0.2 - 1.0 mg/dL 0.2   Nitrite Urine  Negative Negative   Leukocyte Esterase Urine  Negative Negative   APPEARANCE  Clear clear   Color Urine  Yellow yellow   [employer] Lot#  Numeric [id number]   Mult[employer]ix Expiration Date  Date 06/30/2024           IMAGING REVIEW:  Narrative   PROCEDURE: US KIDNEY/BLADDER (CPT=76770)     COMPARISON: None. INDICATIONS: R63.4 Weight loss, unintentional R31.29 Persistent microscopic hematuria     TECHNIQUE: Ultrasound examination was performed to visualize the kidneys and bladder. FINDINGS:  RIGHT KIDNEY: Normal.  Right kidney length is 11.45 cm. Normal echotexture. No hydronephrosis, mass, calculus or perirenal fluid. LEFT KIDNEY: Normal.  Left kidney length is 11.81 cm. Normal echotexture. No hydronephrosis, mass, calculus or perirenal fluid. BLADDER: Normal.  No visible wall thickening, mass, or calculus. CONCLUSION: Normal renal ultrasound.              DICTATED BY (CST): Kiran Pickett MD ON 6/07/2023 AT 9:56 AM      FINALIZED BY (CST): Kiran Pickett MD ON 6/07/2023 AT 9:57 AM                       OTHER RELEVANT DATA:   none     IMPRESSION: Lower urinary tract symptoms namely urgency and frequency currently on mirabegron with mild benefit. She does complain of a wet underwear and leakage with sexual activity. We will plan for urodynamic evaluation. She does work/owns a cleaning company. She may be a candidate for single incision sling. For stress urinary incontinence, I discussed the treatment options of behavioral management (Kegel exercises, weight loss, avoidance of constipation, avoidance of strong coughing, timed voiding, double voiding), incontinence pessary, mid urethral sling, urethral bulking agent. I discussed that there are 3 sling types on the market including a pubovaginal sling, transobturator sling and single incision sling. She understands of the transobturator sling would have 2 groin incisions that may cause groin discomfort with adduction for 6 to 8 weeks postprocedure potentially indefinitely, she understands that the pubovaginal sling would have to incisions above her suprapubic area which increases the risk of potential injury to the bladder and may cause some voiding dysfunction. The single incision sling has only one incision. Reviewed the general risks of the procedure including bleeding, infection, damage to surrounding structures (bladder, bowel, urethra, vagina, ureter), need for additional procedures, mesh exposure, mesh erosion into the bladder or urethra, urinary retention, need for sling loosening, dyspareunia or hispareunia, voiding dysfunction, groin pain, anesthesia complications, persistent stress urinary incontinence. PLAN:  Urodynamic evaluation    Thank you for referring this very pleasant patient to my clinic. If you have any questions or concerns, please do not hesitate to contact me.     Sincerely,  Katt Baker MD    30 minutes were spent on this patient at this visit obtaining a history, reviewing medical records, developing a treatment plan, counseling and discussing treatment strategy with patient, coordination of care and documentation. The [facility] makes medical notes available to patients in the interest of transparency. However, please be advised that this is a medical document. It is intended as a peer to peer communication. It is written in medical language and may contain abbreviations or verbiage that are technical and unfamiliar. It may appear blunt or direct. Medical documents are intended to carry relevant information, facts as evident, and the clinical opinion of the practitioner.

## 2023-12-06 ENCOUNTER — OFFICE VISIT (OUTPATIENT)
Dept: SURGERY | Facility: CLINIC | Age: 38
End: 2023-12-06

## 2023-12-06 ENCOUNTER — TELEPHONE (OUTPATIENT)
Dept: SURGERY | Facility: CLINIC | Age: 38
End: 2023-12-06

## 2023-12-06 VITALS — DIASTOLIC BLOOD PRESSURE: 78 MMHG | HEART RATE: 71 BPM | RESPIRATION RATE: 18 BRPM | SYSTOLIC BLOOD PRESSURE: 114 MMHG

## 2023-12-06 DIAGNOSIS — N39.46 MIXED INCONTINENCE: Primary | ICD-10-CM

## 2023-12-06 DIAGNOSIS — N39.41 URGENCY INCONTINENCE: ICD-10-CM

## 2023-12-06 PROCEDURE — 3074F SYST BP LT 130 MM HG: CPT | Performed by: UROLOGY

## 2023-12-06 PROCEDURE — 51729 CYSTOMETROGRAM W/VP&UP: CPT | Performed by: UROLOGY

## 2023-12-06 PROCEDURE — 51797 INTRAABDOMINAL PRESSURE TEST: CPT | Performed by: UROLOGY

## 2023-12-06 PROCEDURE — 3078F DIAST BP <80 MM HG: CPT | Performed by: UROLOGY

## 2023-12-06 PROCEDURE — 51784 ANAL/URINARY MUSCLE STUDY: CPT | Performed by: UROLOGY

## 2023-12-06 NOTE — PROCEDURES
URODYNAMICS    PRE-OP DIAGNOSIS: LUTS    POST-OP DIAGNOSIS: Same    PROCEDURE:  1. Urodynamics    SURGEON: Nelson Renner MD    ASSISTANT: none    EBL: minimal    FINDINGS:  1. Normal capacity, normal compliance, no overactivity, clear stress urinary incontinence, complete emptying; please note that PVR prior to procedure was 150    INDICATIONS: LUTS namely irritative symptoms and MARIE    PROCEDURE: Patient was brought to the procedure suite and an time-out was performed identifiying the patient,  and procedure being performed. The risks of the procedure were once again detailed to patient including minimal bleeding, infection, dysuria. The patient agreed to proceed. The patient did have a negative urinalysis. No antibiotics were given to patient prior to this procedure. Patient was placed on the table in a supine position for catheter and EMG placement. The EMG patches were placed - one on the bone prominence of the knee cap or fatty part of the thigh and the others, perianally at the 11 o'clock and 1o'clock position as close to the anus as possible. Next, we inserted the vaginal catheter going back as far as possible to the culdesac and then slightly pulled back so the catheter did not get kinked and secured it to the leg. The patient was straight catheterized for 150 ml of urine. We then introduced the bladder catheter 10-12cm for this female patient and secured this to the leg. The transducers were all in the open position and attached to the catheters. We \"zeroed all\", charged the catheters and checked the pressures, ensuring that Pdet (Pves - Pabd) was within +-10 (we adjusted any catheters to ensure this was the case). One we were satisfied with catheter placement, we hit \"equalize\" and started the study by hitting \"run\". We first checked the catheter placement by having the patient cough and noticed a spike in Pves, Pabd but not Pdet. We started the pump which was running normal saline. Urodynamics Report:    Cystometrogram  Fill rate (mL/min) 10cc/min  Baseline Pdet (cm H20) 0cm H20    First sensation 14 cc  First urge 45 cc  Strong urge 160cc    Cystometric capacity 213cc; patient actually voided 450    Compliance normal    Overactivity none         Stress tests  VLPP @ 150cc gross stress urinary incontinence  CLPP @ 150cc gross stress urinary incontinence    VLPP @ 300cc did not perform  CLPP @ 300cc did not perform         Pressure Flow Studies  Pdet max 45 cm of water  Pdet @Qmax 45 cm of water  Qmax not accurately measured  BOOi = [email or web address] - 2Qmax = unobstructed    Comments none      IMPRESSION: Stress urinary incontinence-will have patient return to clinic to discuss possible transobturator sling versus single incision sling    PLAN: Return to clinic to discuss midurethral sling

## 2023-12-06 NOTE — PROGRESS NOTES
Pt came today to have a CMG study done. Patient brought to room , confirmed name and . Used  Homa/ [id number]. Prepared patient per protocol. Pt emptied her bladder prior to procedure. I straight cathed the patient, she had  PVR of 150 ml. I explained the test to the patient, she verbalized her understanding. I inserted the bladder and rectal catheters and started the SW infusion. Guided patient through the individual steps of the CMG. Patient did stress test at approx. 150 mL and she had leakage with coughing and bearing down. Around 210 ml patient said she could no longer hold her urine. At that point patient was permitted to void and I stepped out of the room. Patient was able to void and she voided 450 ml . I finished the test and printed the report Report is on Dr. Smith Miss desk. Also scheduled a follow up appointment with pt as recommended from Dr. Yusra Reza to discuss CMG results and next steps. The patient had not further questions. She was sent on her way.

## 2023-12-06 NOTE — TELEPHONE ENCOUNTER
Called patient, verified name and . I let her know that her pharmacy should have refills for Mirabegron until 2024. I advise her to call her pharmacy and make sure they have the refills, if not she should call us back. Pt verbalized understanding. Encounter complete.

## 2023-12-15 ENCOUNTER — OFFICE VISIT (OUTPATIENT)
Dept: SURGERY | Facility: CLINIC | Age: 38
End: 2023-12-15

## 2023-12-15 DIAGNOSIS — N39.46 MIXED INCONTINENCE: Primary | ICD-10-CM

## 2023-12-15 PROCEDURE — 99214 OFFICE O/P EST MOD 30 MIN: CPT | Performed by: UROLOGY

## 2023-12-15 NOTE — PROGRESS NOTES
Jessica Monaco MD  Department of Urology  [facility] Mehrdad Valiente    T: [phone]  F: [phone]    To: Farrah Horan DO   [address] [place] [address]     Re: Cassi Yu   MRN: [id number]  : 5/3/1985    Dear Luis Gore. DO [place],    Today I had the pleasure of seeing Cassi Yu in my clinic. As you know, Ms. Latrell Morales is a pleasant 45year old year old female who I am seeing for post CMG. Patient was last seen in this department on 2023. Briefly, patient was seen by me in 2023. She is G2, P2 (1 , 1 ) who has had urinary incontinence with urgency, frequency and occasional coughing and laughing. She also had microscopic hematuria and had a renal bladder ultrasound that was negative. She also had a cystoscopy that demonstrated no abnormalities. I did not see any stress incontinence with her bladder full and patient coughing. Did see mild incontinence with extreme straining. Her voiding diary demonstrated 2 coffees, 2 beers a week and 5 voids. It did not mention any urgency. As she complained of urgency and frequency I recommended mirabegron 50 mg. Today she states that the mirabegron has helped her urgency and frequency slightly. She still has leakage of urine with sexual activity. She wears 2-3 pads a day that are wet when she changes them    Cystoscopy negative  CMG with Normal capacity, normal compliance, no overactivity, clear stress urinary incontinence, complete emptying; please note that PVR prior to procedure was 150     Stopped the mirabegron as she could not  at the pharmacy.  She feels that it may have helped her but she still has significant urgency and frequency      PAST MEDICAL HISTORY:  Past Medical History:   Diagnosis Date    Influenza 2019    IUD threads lost 2022    Numbness of left foot 2020    Palpitations 2015 Unintentional weight loss of more than 5% body weight within 1 month 2023    Reported 30lb weight loss in the last 1mo or so. Only associated symptom is hair loss and some intermittent LUQ abdominal pain that radiates to the L back. No other associated symptoms      Pt uptodate on cervcial cancer screening.    Overdue for mammogram -ordered   Pt with significant family hx of breast cancer in sister x 2 in their 45s and paternal cousin dx with metastatic colon cancer at the        PAST SURGICAL HISTORY:  Past Surgical History:   Procedure Laterality Date      2005    CYST ASPIRATION LEFT  2016    ELECTROCARDIOGRAM, COMPLETE  2012    scanned to media         ALLERGIES:  Allergies   Allergen Reactions    Amoxicillin RASH    Ibuprofen SHORTNESS OF BREATH         MEDICATIONS:  Current Outpatient Medications   Medication Instructions    acetaminophen-codeine (TYLENOL WITH CODEINE #3) 300-30 MG Oral Tab 1 tablet, Oral, Nightly PRN    Mirabegron ER (MYRBETRIQ) 50 mg, Oral, Daily    omeprazole (PRILOSEC) 20 mg, Oral, Every morning before breakfast        FAMILY HISTORY:  Family History   Problem Relation Age of Onset    Cancer Mother 43        brain cancer    Cancer Father 72        prostate ca    Migraines Sister     Breast Cancer Sister 35    Cancer Maternal Grandmother         brain cancer    Breast Cancer Paternal Cousin Female         age 45s    Breast Cancer Paternal Cousin Female         age 45s    Colon Cancer Paternal Cousin Male 39    Breast Cancer Half-Sister 40    Prostate Cancer Maternal Uncle     Prostate Cancer Maternal Uncle         SOCIAL HISTORY:  Social History     Socioeconomic History    Marital status:    Tobacco Use    Smoking status: Never    Smokeless tobacco: Never   Vaping Use    Vaping Use: Never used   Substance and Sexual Activity    Alcohol use: Yes     Comment: rarely    Drug use: No    Sexual activity: Yes     Partners: Male     Birth control/protection: Implant     Comment: nexplanon placed 12/8   Other Topics Concern    Caffeine Concern No          PHYSICAL EXAMINATION:  There were no vitals filed for this visit. CONSTITUTIONAL: No apparent distress, cooperative and communicative  NEUROLOGIC: Alert and oriented   HEAD: Normocephalic, atraumatic   EYES: Sclera non-icteric   ENT: Hearing intact, moist mucous membranes   NECK: No obvious goiter or masses   RESPIRATORY: Normal respiratory effort, Nonlabored breathing on room air  SKIN: No evident rashes   ABDOMEN: Soft, nontender, nondistended, no rebound tenderness, no guarding, no masses      REVIEW OF SYSTEMS:    A comprehensive 10-point review of systems was completed. Pertinent positives and negatives are noted in the the HPI. LABORATORY DATA:  URINE CULTURE No Growth 2 Days            Component  Ref Range & Units 5/5/23 11:13 AM   WBC  4.0 - 11.0 x10(3) uL 7.4   RBC  3.80 - 5.30 x10(6)uL 4.27   HGB  12.0 - 16.0 g/dL 13.3   HCT  35.0 - 48.0 % 39.1   MCV  80.0 - 100.0 fL 91.6   MCH  26.0 - 34.0 pg 31.1   MCHC  31.0 - 37.0 g/dL 34.0   RDW-SD  35.1 - 46.3 fL 40.2   RDW  11.0 - 15.0 % 11.9   PLT  150.0 - 450.0 10(3)uL 274.0        Urine Color  Yellow Light-Yellow   Clarity Urine  Clear Clear   Spec Gravity  1.005 - 1.030 1.025   Glucose Urine  Normal mg/dL Normal   Bilirubin Urine  Negative Negative   Ketones Urine  Negative mg/dL Negative   Blood Urine  Negative Trace Abnormal    pH Urine  5.0 - 8.0 6.0   Protein Urine  Negative, Trace mg/dL Negative   Urobilinogen Urine  Normal Normal   Nitrite Urine  Negative Negative   Leukocyte Esterase Urine  Negative Negative   WBC Urine  0 - 5 /HPF 1-5   RBC Urine  0 - 2 /HPF 6-10 Abnormal    Bacteria Urine  None Seen /HPF None Seen   Squamous Epi.  Cells  None Seen /HPF Few Abnormal            IMAGING REVIEW:  Narrative             Impression   PROCEDURE: US BREAST LEFT LIMITED  (BKH=50099)     COMPARISON: [facility] for [facility], [address] [place] [address] COMPLETE (GIO=[id number]), 5/19/2023, 9:47 AM.     INDICATIONS: Abnormal mammogram.     TECHNIQUE: Breast ultrasound was performed with evaluation only on specific areas of concern. FINDINGS: The examination was performed on the same date as a diagnostic mammogram.  Please see that report for further details. Dictated by (CST): Dara García MD on 7/13/2023 at 9:59 AM      Finalized by (CST): Dara García MD on 7/13/2023 at 9:59 AM              OTHER RELEVANT DATA:   none     IMPRESSION: Mixed incontinece with urgency with mild results on mirabegron will plan for mirabegron and voiding diary. If improvement, then can proceed with PV versus TOT sling. Offered single incision sling at OSH. AUGS pamphlets provided. Physical therapy referral placed. Discussed with patient and . For stress urinary incontinence, I discussed the treatment options of behavioral management (Kegel exercises, weight loss, avoidance of constipation, avoidance of strong coughing, timed voiding, double voiding), incontinence pessary, mid urethral sling, urethral bulking agent. I discussed that there are 3 sling types on the market including a pubovaginal sling, transobturator sling and single incision sling. She understands of the transobturator sling would have 2 groin incisions that may cause groin discomfort with adduction for 6 to 8 weeks postprocedure potentially indefinitely, she understands that the pubovaginal sling would have to incisions above her suprapubic area which increases the risk of potential injury to the bladder and may cause some voiding dysfunction. The single incision sling has only one incision.     Reviewed the general risks of the procedure including bleeding, infection, damage to surrounding structures (bladder, bowel, urethra, vagina, ureter), need for additional procedures, mesh exposure, mesh erosion into the bladder or urethra, urinary retention, need for sling loosening, dyspareunia or hispareunia, voiding dysfunction, groin pain, anesthesia complications, persistent stress urinary incontinence. PLAN:  Mirabegron 50mg daily  RTC 1-2 months  Pelvic floor PT  If improvement in UUI, will move forward with PV versus TOT sling or if she wishes, single incision sling at OSH    Thank you for referring this very pleasant patient to my clinic. If you have any questions or concerns, please do not hesitate to contact me. Sincerely,  Anca Oconnell MD    30 minutes were spent on this patient at this visit obtaining a history, reviewing medical records, developing a treatment plan, counseling and discussing treatment strategy with patient, coordination of care and documentation. The [id number] makes medical notes available to patients in the interest of transparency. However, please be advised that this is a medical document. It is intended as a peer to peer communication. It is written in medical language and may contain abbreviations or verbiage that are technical and unfamiliar. It may appear blunt or direct. Medical documents are intended to carry relevant information, facts as evident, and the clinical opinion of the practitioner.

## 2024-02-02 ENCOUNTER — OFFICE VISIT (OUTPATIENT)
Dept: FAMILY MEDICINE CLINIC | Facility: CLINIC | Age: 39
End: 2024-02-02

## 2024-02-02 VITALS — HEIGHT: 64 IN | WEIGHT: 198 LBS | BODY MASS INDEX: 33.8 KG/M2

## 2024-02-02 DIAGNOSIS — Z00.00 PHYSICAL EXAM: Primary | ICD-10-CM

## 2024-02-02 PROCEDURE — 99395 PREV VISIT EST AGE 18-39: CPT | Performed by: FAMILY MEDICINE

## 2024-02-02 PROCEDURE — 90686 IIV4 VACC NO PRSV 0.5 ML IM: CPT | Performed by: FAMILY MEDICINE

## 2024-02-02 PROCEDURE — 90471 IMMUNIZATION ADMIN: CPT | Performed by: FAMILY MEDICINE

## 2024-02-02 PROCEDURE — 3008F BODY MASS INDEX DOCD: CPT | Performed by: FAMILY MEDICINE

## 2024-02-02 NOTE — PROGRESS NOTES
2024  12:32 PM    Judd Montaño is a 38 year old female.    Chief complaint(s):   Chief Complaint   Patient presents with    Routine Physical    Medication Request     Prilosec pt buys otc and requesting today      HPI:     Judd Montaño primary complaint is regarding CPE.       Shelbi Oneil is a 38 year old female present today for a routine periodic health gynecological screening and/or complete physical examination.  Her last physical exam was 2 year(s) ago. Patient's last menstrual period was 23.   Menarche occurred at age 14 .  She is currently using Nexplanon as a form of contraception.    Shelbi Oneil is G 2, P2, Ab 0.   She has a history of veneral infection significant for Chlamydia, HSV.   She performs breast self-exams monthly .  Her last TDAP (orTD) booster was 6 years ago. She is not current with her influenza immunization.  Her last Pap smear was 3 year(s) ago and was normal .  Her last mammogram was 6 year(s) ago and was normal.  Regarding colon cancer  screening test she underwent colonoscopy none.  None smoker.     HISTORY:  Past Medical History:   Diagnosis Date    Influenza 2019    IUD threads lost 2022    Numbness of left foot 2020    Palpitations 2015    Unintentional weight loss of more than 5% body weight within 1 month 2023    Reported 30lb weight loss in the last 1mo or so.  Only associated symptom is hair loss and some intermittent LUQ abdominal pain that radiates to the L back. No other associated symptoms      Pt uptodate on cervcial cancer screening.   Overdue for mammogram -ordered   Pt with significant family hx of breast cancer in sister x 2 in their 40s and paternal cousin dx with metastatic colon cancer at the      Past Surgical History:   Procedure Laterality Date      2005    CYST ASPIRATION LEFT  2016    ELECTROCARDIOGRAM, COMPLETE  2012    scanned to media      Family  History   Problem Relation Age of Onset    Cancer Mother 42        brain cancer    Cancer Father 65        prostate ca    Migraines Sister     Breast Cancer Sister 33    Cancer Maternal Grandmother         brain cancer    Breast Cancer Paternal Cousin Female         age 40s    Breast Cancer Paternal Cousin Female         age 40s    Colon Cancer Paternal Cousin Male 36    Breast Cancer Half-Sister 44    Prostate Cancer Maternal Uncle     Prostate Cancer Maternal Uncle       Social History:   Social History     Socioeconomic History    Marital status:    Tobacco Use    Smoking status: Never    Smokeless tobacco: Never   Vaping Use    Vaping Use: Never used   Substance and Sexual Activity    Alcohol use: Yes     Comment: rarely    Drug use: No    Sexual activity: Yes     Partners: Male     Birth control/protection: Implant     Comment: nexplanon placed 12/8   Other Topics Concern    Caffeine Concern No        Immunizations:   Immunization History   Administered Date(s) Administered    FLULAVAL 6 months & older 0.5 ml Prefilled syringe (66581) 12/03/2022    TDAP 01/14/2016   Pended Date(s) Pended    FLUZONE 6 months and older PFS 0.5 ml (56691) 02/02/2024       Medications (Active prior to today's visit):  Current Outpatient Medications   Medication Sig Dispense Refill    Mirabegron ER 50 MG Oral Tablet 24 Hr Take 1 tablet (50 mg total) by mouth daily. (Patient not taking: Reported on 2/2/2024) 90 tablet 3    acetaminophen-codeine (TYLENOL WITH CODEINE #3) 300-30 MG Oral Tab Take 1 tablet by mouth nightly as needed for Pain. (Patient not taking: Reported on 10/30/2023) 30 tablet 0    omeprazole 20 MG Oral Capsule Delayed Release Take 1 capsule (20 mg total) by mouth every morning before breakfast. (Patient not taking: Reported on 2/2/2024)         Allergies:  Allergies   Allergen Reactions    Amoxicillin RASH    Ibuprofen SHORTNESS OF BREATH         ROS:   Review of Systems   Constitutional:  Negative for  appetite change, fatigue and fever.   HENT:  Negative for ear pain, hearing loss and nosebleeds.    Eyes:  Negative for visual disturbance.   Respiratory:  Negative for apnea, shortness of breath and wheezing.    Cardiovascular:  Negative for chest pain, palpitations and leg swelling.   Gastrointestinal:  Negative for abdominal pain, blood in stool, constipation, nausea and vomiting.   Endocrine: Negative for polydipsia and polyuria.   Genitourinary:  Negative for dyspareunia and menstrual problem.   Musculoskeletal:  Positive for arthralgias. Negative for back pain.   Skin:  Negative for rash.   Allergic/Immunologic: Negative for food allergies.   Neurological:  Negative for dizziness, syncope, light-headedness and headaches.   Psychiatric/Behavioral:  Negative for sleep disturbance.        PHYSICAL EXAM:   VS: Ht 5' 4\" (1.626 m)   Wt 198 lb (89.8 kg)   LMP 12/16/2023 (Within Days)   BMI 33.99 kg/m²     Physical Exam  Vitals reviewed.   Constitutional:       Appearance: She is well-developed.   HENT:      Head: Normocephalic.      Right Ear: Hearing, tympanic membrane, ear canal and external ear normal.      Left Ear: Hearing, tympanic membrane, ear canal and external ear normal.      Nose: Nose normal.      Mouth/Throat:      Mouth: Mucous membranes are moist.   Eyes:      Extraocular Movements: Extraocular movements intact.      Conjunctiva/sclera: Conjunctivae normal.      Pupils: Pupils are equal, round, and reactive to light.   Neck:      Thyroid: No thyromegaly.   Cardiovascular:      Rate and Rhythm: Normal rate and regular rhythm.      Heart sounds: Normal heart sounds, S1 normal and S2 normal. No murmur heard.  Pulmonary:      Effort: Pulmonary effort is normal.      Breath sounds: Normal breath sounds.   Chest:   Breasts:     Right: No mass.      Left: No mass.   Abdominal:      General: Bowel sounds are normal.      Palpations: Abdomen is soft. There is no mass.      Tenderness: There is no abdominal  tenderness.      Hernia: No hernia is present.   Musculoskeletal:      Cervical back: Neck supple.      Comments: Spine without scoliosis  Range of motions of both upper and lower extremities are normal.   Lymphadenopathy:      Cervical: No cervical adenopathy.      Comments: LEs no edema    Skin:     Findings: No rash.   Neurological:      General: No focal deficit present.      Mental Status: She is alert.      Deep Tendon Reflexes:      Reflex Scores:       Patellar reflexes are 2+ on the right side and 2+ on the left side.  Psychiatric:         Mood and Affect: Mood and affect normal.         LABORATORY RESULTS:      EKG / Spirometry : -     Radiology: No results found.     ASSESSMENT/PLAN:   Assessment   Encounter Diagnosis   Name Primary?    Physical exam Yes       Assessment and Plan:     Judd Montaño Health checkup as follows:    LABORATORY & ORDERS:   Orders Placed This Encounter   Procedures    CBC With Differential With Platelet    Comp Metabolic Panel (14)    Hemoglobin A1C    Lipid Panel    TSH W Reflex To Free T4    Vitamin D    Urinalysis with Culture Reflex    Fluzone Quadrivalent 6mo+ 0.5mL      REFERRALS: generated today : INFLUENZA VAC, QUAD, PRSV FREE, 0.5 ML .    IMMUNIZATIONS ordered and given today include: none.    RECOMMENDATIONS given include: ANTICIPATORY GUIDANCE  topics covered today include: safety (i.e. seat belts, helmets, sunscreen, protective sports gear ), nutrition (i.e. healthy meals and snacks (i.e. avoid junk food and high-carbohydrate foods); athletic conditioning, fluids; low fat milk, limit to less than 20 oz. a day; dental care with her dentist), and healthy habits & social competence & responsibilities: Recommendations on physical activity; exercise daily or at least 3 times a week for 30-60 minutes doing cardiovascular exercise. Patient educated on self breast examination to be done on a monthly basis.  Consider a  if over weight and/or  having difficult in staying active. Attempt to keep a schedule that includes adequate sleep, and physical activities/exercise. Patient was educated on sexual transmitted disease. Best to abstain from sexual intercourse until she is ready to form a family. Use of condoms may prevent transmission of infections as well as pregnancy.   Contraception option chosen by patient was Nexplanon.  REFUSALS:  Although recommended, the patient refuses the followin .      FOLLOW-UP: Schedule a follow-up visit in 12 months.            Orders This Visit:  Orders Placed This Encounter   Procedures    CBC With Differential With Platelet    Comp Metabolic Panel (14)    Hemoglobin A1C    Lipid Panel    TSH W Reflex To Free T4    Vitamin D    Urinalysis with Culture Reflex    Fluzone Quadrivalent 6mo+ 0.5mL       Meds This Visit:  Requested Prescriptions      No prescriptions requested or ordered in this encounter       Imaging & Referrals:  INFLUENZA VAC, QUAD, PRSV FREE, 0.5 ML         AFSANEH JOHNSON MD

## 2024-02-03 ENCOUNTER — LAB ENCOUNTER (OUTPATIENT)
Dept: LAB | Age: 39
End: 2024-02-03
Attending: FAMILY MEDICINE
Payer: COMMERCIAL

## 2024-02-03 DIAGNOSIS — Z00.00 PHYSICAL EXAM: ICD-10-CM

## 2024-02-03 LAB
ALBUMIN SERPL-MCNC: 4.1 G/DL (ref 3.2–4.8)
ALBUMIN/GLOB SERPL: 1.4 {RATIO} (ref 1–2)
ALP LIVER SERPL-CCNC: 72 U/L
ALT SERPL-CCNC: 10 U/L
ANION GAP SERPL CALC-SCNC: 9 MMOL/L (ref 0–18)
AST SERPL-CCNC: 11 U/L (ref ?–34)
BASOPHILS # BLD AUTO: 0.03 X10(3) UL (ref 0–0.2)
BASOPHILS NFR BLD AUTO: 0.5 %
BILIRUB SERPL-MCNC: 0.5 MG/DL (ref 0.3–1.2)
BILIRUB UR QL: NEGATIVE
BUN BLD-MCNC: 11 MG/DL (ref 9–23)
BUN/CREAT SERPL: 16.2 (ref 10–20)
CALCIUM BLD-MCNC: 9.1 MG/DL (ref 8.7–10.4)
CHLORIDE SERPL-SCNC: 107 MMOL/L (ref 98–112)
CHOLEST SERPL-MCNC: 186 MG/DL (ref ?–200)
CLARITY UR: CLEAR
CO2 SERPL-SCNC: 23 MMOL/L (ref 21–32)
CREAT BLD-MCNC: 0.68 MG/DL
DEPRECATED RDW RBC AUTO: 37.9 FL (ref 35.1–46.3)
EGFRCR SERPLBLD CKD-EPI 2021: 114 ML/MIN/1.73M2 (ref 60–?)
EOSINOPHIL # BLD AUTO: 0.09 X10(3) UL (ref 0–0.7)
EOSINOPHIL NFR BLD AUTO: 1.6 %
ERYTHROCYTE [DISTWIDTH] IN BLOOD BY AUTOMATED COUNT: 11.9 % (ref 11–15)
EST. AVERAGE GLUCOSE BLD GHB EST-MCNC: 114 MG/DL (ref 68–126)
FASTING PATIENT LIPID ANSWER: YES
FASTING STATUS PATIENT QL REPORTED: YES
GLOBULIN PLAS-MCNC: 2.9 G/DL (ref 2.8–4.4)
GLUCOSE BLD-MCNC: 102 MG/DL (ref 70–99)
GLUCOSE UR-MCNC: NORMAL MG/DL
HBA1C MFR BLD: 5.6 % (ref ?–5.7)
HCT VFR BLD AUTO: 36.9 %
HDLC SERPL-MCNC: 43 MG/DL (ref 40–59)
HGB BLD-MCNC: 13.1 G/DL
IMM GRANULOCYTES # BLD AUTO: 0.02 X10(3) UL (ref 0–1)
IMM GRANULOCYTES NFR BLD: 0.4 %
KETONES UR-MCNC: NEGATIVE MG/DL
LDLC SERPL CALC-MCNC: 123 MG/DL (ref ?–100)
LEUKOCYTE ESTERASE UR QL STRIP.AUTO: NEGATIVE
LYMPHOCYTES # BLD AUTO: 1.85 X10(3) UL (ref 1–4)
LYMPHOCYTES NFR BLD AUTO: 33.4 %
MCH RBC QN AUTO: 31 PG (ref 26–34)
MCHC RBC AUTO-ENTMCNC: 35.5 G/DL (ref 31–37)
MCV RBC AUTO: 87.2 FL
MONOCYTES # BLD AUTO: 0.41 X10(3) UL (ref 0.1–1)
MONOCYTES NFR BLD AUTO: 7.4 %
NEUTROPHILS # BLD AUTO: 3.14 X10 (3) UL (ref 1.5–7.7)
NEUTROPHILS # BLD AUTO: 3.14 X10(3) UL (ref 1.5–7.7)
NEUTROPHILS NFR BLD AUTO: 56.7 %
NITRITE UR QL STRIP.AUTO: NEGATIVE
NONHDLC SERPL-MCNC: 143 MG/DL (ref ?–130)
OSMOLALITY SERPL CALC.SUM OF ELEC: 288 MOSM/KG (ref 275–295)
PH UR: 6.5 [PH] (ref 5–8)
PLATELET # BLD AUTO: 258 10(3)UL (ref 150–450)
POTASSIUM SERPL-SCNC: 3.8 MMOL/L (ref 3.5–5.1)
PROT SERPL-MCNC: 7 G/DL (ref 5.7–8.2)
PROT UR-MCNC: NEGATIVE MG/DL
RBC # BLD AUTO: 4.23 X10(6)UL
RBC #/AREA URNS AUTO: >10 /HPF
SODIUM SERPL-SCNC: 139 MMOL/L (ref 136–145)
SP GR UR STRIP: 1.02 (ref 1–1.03)
TRIGL SERPL-MCNC: 111 MG/DL (ref 30–149)
TSI SER-ACNC: 0.94 MIU/ML (ref 0.55–4.78)
UROBILINOGEN UR STRIP-ACNC: NORMAL
VIT D+METAB SERPL-MCNC: 17.4 NG/ML (ref 30–100)
VLDLC SERPL CALC-MCNC: 20 MG/DL (ref 0–30)
WBC # BLD AUTO: 5.5 X10(3) UL (ref 4–11)

## 2024-02-03 PROCEDURE — 83036 HEMOGLOBIN GLYCOSYLATED A1C: CPT

## 2024-02-03 PROCEDURE — 81001 URINALYSIS AUTO W/SCOPE: CPT

## 2024-02-03 PROCEDURE — 85025 COMPLETE CBC W/AUTO DIFF WBC: CPT

## 2024-02-03 PROCEDURE — 36415 COLL VENOUS BLD VENIPUNCTURE: CPT

## 2024-02-03 PROCEDURE — 84443 ASSAY THYROID STIM HORMONE: CPT

## 2024-02-03 PROCEDURE — 80061 LIPID PANEL: CPT

## 2024-02-03 PROCEDURE — 82306 VITAMIN D 25 HYDROXY: CPT

## 2024-02-03 PROCEDURE — 80053 COMPREHEN METABOLIC PANEL: CPT

## 2024-02-03 RX ORDER — ERGOCALCIFEROL 1.25 MG/1
50000 CAPSULE ORAL WEEKLY
Qty: 12 CAPSULE | Refills: 3 | Status: SHIPPED | OUTPATIENT
Start: 2024-02-03 | End: 2025-02-02

## 2024-02-03 NOTE — PROGRESS NOTES
Please notify patient that his/her blood test showed low levels of vitamin D. A prescription was sent to his pharmacy to take one capsule or tablet, once a week. This Rx is good for one year. We'll recheck levels in one year.

## 2024-02-16 ENCOUNTER — LAB ENCOUNTER (OUTPATIENT)
Dept: LAB | Age: 39
End: 2024-02-16
Attending: FAMILY MEDICINE
Payer: COMMERCIAL

## 2024-02-16 ENCOUNTER — OFFICE VISIT (OUTPATIENT)
Dept: FAMILY MEDICINE CLINIC | Facility: CLINIC | Age: 39
End: 2024-02-16

## 2024-02-16 ENCOUNTER — TELEPHONE (OUTPATIENT)
Dept: SURGERY | Facility: CLINIC | Age: 39
End: 2024-02-16

## 2024-02-16 ENCOUNTER — OFFICE VISIT (OUTPATIENT)
Dept: SURGERY | Facility: CLINIC | Age: 39
End: 2024-02-16

## 2024-02-16 VITALS
HEART RATE: 65 BPM | WEIGHT: 201 LBS | SYSTOLIC BLOOD PRESSURE: 126 MMHG | DIASTOLIC BLOOD PRESSURE: 85 MMHG | HEIGHT: 64 IN | BODY MASS INDEX: 34.31 KG/M2

## 2024-02-16 DIAGNOSIS — Z01.419 GYNECOLOGIC EXAM NORMAL: ICD-10-CM

## 2024-02-16 DIAGNOSIS — Z01.419 GYNECOLOGIC EXAM NORMAL: Primary | ICD-10-CM

## 2024-02-16 DIAGNOSIS — N39.46 MIXED INCONTINENCE: Primary | ICD-10-CM

## 2024-02-16 LAB — CANCER AG125 SERPL-ACNC: 9 U/ML (ref ?–30.2)

## 2024-02-16 PROCEDURE — 3008F BODY MASS INDEX DOCD: CPT | Performed by: FAMILY MEDICINE

## 2024-02-16 PROCEDURE — 99214 OFFICE O/P EST MOD 30 MIN: CPT | Performed by: UROLOGY

## 2024-02-16 PROCEDURE — 36415 COLL VENOUS BLD VENIPUNCTURE: CPT

## 2024-02-16 PROCEDURE — 3074F SYST BP LT 130 MM HG: CPT | Performed by: FAMILY MEDICINE

## 2024-02-16 PROCEDURE — 86304 IMMUNOASSAY TUMOR CA 125: CPT

## 2024-02-16 PROCEDURE — 3079F DIAST BP 80-89 MM HG: CPT | Performed by: FAMILY MEDICINE

## 2024-02-16 PROCEDURE — 99395 PREV VISIT EST AGE 18-39: CPT | Performed by: FAMILY MEDICINE

## 2024-02-16 RX ORDER — SULFAMETHOXAZOLE AND TRIMETHOPRIM 800; 160 MG/1; MG/1
1 TABLET ORAL 2 TIMES DAILY
Qty: 14 TABLET | Refills: 0 | Status: SHIPPED | OUTPATIENT
Start: 2024-02-16 | End: 2024-02-16

## 2024-02-16 RX ORDER — SULFAMETHOXAZOLE AND TRIMETHOPRIM 800; 160 MG/1; MG/1
1 TABLET ORAL 2 TIMES DAILY
Qty: 6 TABLET | Refills: 0 | Status: SHIPPED | OUTPATIENT
Start: 2024-02-16 | End: 2024-02-19

## 2024-02-16 NOTE — PROGRESS NOTES
January Lai MD  Department of Urology  [address]  [place] [address]    T: [phone]  F: [phone]    To: David Argueta DO   [address]  [place] [address]    Re: Judd Montaño   MRN: [id number]  : 5/3/1985    Dear David Argueta DO,    Today I had the pleasure of seeing Judd Montaño in my clinic. As you know, Ms. Clarice Montaño is a pleasant 38 year old year old female who I am seeing for follow up. Patient was last seen in this department on 12/15/2023.    Briefly,  patient was seen by me in 2023.  She is G2, P2 (1 , 1 ) who has had urinary incontinence with urgency, frequency and occasional coughing and laughing.  She also had microscopic hematuria and had a renal bladder ultrasound that was negative.  She also had a cystoscopy that demonstrated no abnormalities.  I did not see any stress incontinence with her bladder full and patient coughing.  Did see mild incontinence with extreme straining.  Her voiding diary demonstrated 2 coffees, 2 beers a week and 5 voids.  It did not mention any urgency.  As she complained of urgency and frequency I recommended mirabegron 50 mg.     Today she states that the mirabegron has helped her urgency and frequency slightly.  She still has leakage of urine with sexual activity.  She wears 2-3 pads a day that are wet when she changes them     Cystoscopy negative  CMG with Normal capacity, normal compliance, no overactivity, clear stress urinary incontinence, complete emptying; please note that PVR prior to procedure was 150      Stopped the mirabegron as she could not  at the pharmacy. She feels that it may have helped her but she still has significant urgency and frequency     Plan at last vsiit was to proceed with mirabegron 50mg daily, pelvic floor PT.     Today she states mirabegron has helped.    She still has MARIE.       PAST MEDICAL HISTORY:  Past  Medical History:   Diagnosis Date    Influenza 2019    IUD threads lost 2022    Numbness of left foot 2020    Palpitations 2015    Unintentional weight loss of more than 5% body weight within 1 month 2023    Reported 30lb weight loss in the last 1mo or so.  Only associated symptom is hair loss and some intermittent LUQ abdominal pain that radiates to the L back. No other associated symptoms      Pt uptodate on cervcial cancer screening.   Overdue for mammogram -ordered   Pt with significant family hx of breast cancer in sister x 2 in their 40s and paternal cousin dx with metastatic colon cancer at the        PAST SURGICAL HISTORY:  Past Surgical History:   Procedure Laterality Date      2005    CYST ASPIRATION LEFT  2016    ELECTROCARDIOGRAM, COMPLETE  2012    scanned to media         ALLERGIES:  Allergies   Allergen Reactions    Amoxicillin RASH    Ibuprofen SHORTNESS OF BREATH         MEDICATIONS:  Current Outpatient Medications   Medication Instructions    acetaminophen-codeine (TYLENOL WITH CODEINE #3) 300-30 MG Oral Tab 1 tablet, Oral, Nightly PRN    ergocalciferol (VITAMIN D2) 50,000 Units, Oral, Weekly    Mirabegron ER (MYRBETRIQ) 50 mg, Oral, Daily    omeprazole (PRILOSEC) 20 mg, Every morning before breakfast        FAMILY HISTORY:  Family History   Problem Relation Age of Onset    Cancer Mother 42        brain cancer    Cancer Father 65        prostate ca    Migraines Sister     Breast Cancer Sister 33    Cancer Maternal Grandmother         brain cancer    Breast Cancer Paternal Cousin Female         age 40s    Breast Cancer Paternal Cousin Female         age 40s    Colon Cancer Paternal Cousin Male 36    Breast Cancer Half-Sister 44    Prostate Cancer Maternal Uncle     Prostate Cancer Maternal Uncle         SOCIAL HISTORY:  Social History     Socioeconomic History    Marital status:    Tobacco Use    Smoking status: Never    Smokeless tobacco:  Never   Vaping Use    Vaping Use: Never used   Substance and Sexual Activity    Alcohol use: Yes     Comment: rarely    Drug use: No    Sexual activity: Yes     Partners: Male     Birth control/protection: Implant     Comment: nexplanon placed 12/8   Other Topics Concern    Caffeine Concern No          PHYSICAL EXAMINATION:  There were no vitals filed for this visit.  CONSTITUTIONAL: No apparent distress, cooperative and communicative  NEUROLOGIC: Alert and oriented   HEAD: Normocephalic, atraumatic   EYES: Sclera non-icteric   ENT: Hearing intact, moist mucous membranes   NECK: No obvious goiter or masses   RESPIRATORY: Normal respiratory effort, Nonlabored breathing on room air  SKIN: No evident rashes   ABDOMEN: Soft, nontender, nondistended, no rebound tenderness, no guarding, no masses    REVIEW OF SYSTEMS:    A comprehensive 10-point review of systems was completed.  Pertinent positives and negatives are noted in the the HPI.       LABORATORY DATA:  Urine Color  Yellow Light-Yellow   Clarity Urine  Clear Clear   Spec Gravity  1.005 - 1.030 1.024   Glucose Urine  Normal mg/dL Normal   Bilirubin Urine  Negative Negative   Ketones Urine  Negative mg/dL Negative   Blood Urine  Negative 1+ Abnormal    pH Urine  5.0 - 8.0 6.5   Protein Urine  Negative mg/dL Negative   Urobilinogen Urine  Normal Normal   Nitrite Urine  Negative Negative   Leukocyte Esterase Urine  Negative Negative   WBC Urine  0 - 5 /HPF 1-5   RBC Urine  0 - 2 /HPF >10 Abnormal    Bacteria Urine  None Seen /HPF None Seen   Squamous Epi. Cells  None Seen /HPF Few Abnormal    Renal Tubular Epithelial Cells  None Seen /HPF None Seen   Transitional Cells  None Seen /HPF None Seen   Yeast Urine  None Seen /HPF None Seen     HgbA1C  <5.7 % 5.6   Comment:  Normal HbA1C:     <5.7%   Pre-Diabetic:     5.7 - 6.4%   Diabetic:         >6.4%   Diabetic Control: <7.0%     Estimated Average Glucose  68 - 126 mg/dL 114   Comment: eAG is the estimated average gl            IMAGING REVIEW:  Narrative   PROCEDURE: US KIDNEY/BLADDER (CPT=76770)     COMPARISON: None.     INDICATIONS: R63.4 Weight loss, unintentional R31.29 Persistent microscopic hematuria     TECHNIQUE: Ultrasound examination was performed to visualize the kidneys and bladder.       FINDINGS:  RIGHT KIDNEY: Normal.  Right kidney length is 11.45 cm.  Normal echotexture.  No hydronephrosis, mass, calculus or perirenal fluid.    LEFT KIDNEY: Normal.  Left kidney length is 11.81 cm.  Normal echotexture.  No hydronephrosis, mass, calculus or perirenal fluid.    BLADDER: Normal.  No visible wall thickening, mass, or calculus.       CONCLUSION: Normal renal ultrasound.             DICTATED BY (CST): PALMIRA MATHEWS MD ON 6/07/2023 AT 9:56 AM      FINALIZED BY (CST): PALMIRA MATHEWS MD ON 6/07/2023 AT 9:57 AM                       OTHER RELEVANT DATA:   none     IMPRESSION:  Mixed incontinece with urgency with mild results on mirabegron will plan for mirabegron and voiding diary. If improvement, then can proceed with PV versus TOT sling. Offered single incision sling at OSH. AUGS pamphlets provided. Physical therapy referral placed. Discussed with patient and . - she has urgency controlled and opted for PV sling.     For stress urinary incontinence, I discussed the treatment options of behavioral management (Kegel exercises, weight loss, avoidance of constipation, avoidance of strong coughing, timed voiding, double voiding), incontinence pessary, mid urethral sling, urethral bulking agent.     I discussed that there are 3 sling types on the market including a pubovaginal sling, transobturator sling and single incision sling.  She understands of the transobturator sling would have 2 groin incisions that may cause groin discomfort with adduction for 6 to 8 weeks postprocedure potentially indefinitely, she understands that the pubovaginal sling would have to incisions above her suprapubic area which increases  the risk of potential injury to the bladder and may cause some voiding dysfunction.  The single incision sling has only one incision.     Reviewed the general risks of the procedure including bleeding, infection, damage to surrounding structures (bladder, bowel, urethra, vagina, ureter), need for additional procedures, mesh exposure, mesh erosion into the bladder or urethra, urinary retention, need for sling loosening, dyspareunia or hispareunia, voiding dysfunction, groin pain, anesthesia complications, persistent stress urinary incontinence.        PLAN:  OR: pubovaginal sling, cystoscopy  Cbc, chem7, inr, urine cutlure  Weight loss  Bactrim 3 days prior to surgery   May have to place TOT sling if unable to place PV    Thank you for referring this very pleasant patient to my clinic. If you have any questions or concerns, please do not hesitate to contact me.    Sincerely,  January Lai MD    30 minutes were spent on this patient at this visit obtaining a history, reviewing medical records, developing a treatment plan, counseling and discussing treatment strategy with patient, coordination of care and documentation.     The 21st Century Cures Act makes medical notes available to patients in the interest of transparency.  However, please be advised that this is a medical document.  It is intended as a peer to peer communication.  It is written in medical language and may contain abbreviations or verbiage that are technical and unfamiliar.  It may appear blunt or direct.  Medical documents are intended to carry relevant information, facts as evident, and the clinical opinion of the practitioner.

## 2024-02-16 NOTE — TELEPHONE ENCOUNTER
Hi!  This patient needs a pubovaginal sling and cystoscopy. Should be booked for 1.5 hours in the next 1-3  months. Needs labs as ordered below. Can you have the lynx blue sling and obtryx 2 sling ordered and available. She is to start bactrim 3 days prior.     Thanks!  AR    Urology Surgery Scheduling Request    Location: [facility] OR    Surgeon: SEE Lai MD    Asst. Surgeon: N/A    Diagnosis: stress incontinence     Procedure: pubovaginal sling, cystoscopy    Anesthesia: General     Time Frame: 1-3 months    Time needed: 1.5 hours    Special Equipment: Basic kit, vaginal speculum, lone star square, lynx blue sling, obtryx II sling    On Call to OR: Ancef    Admission: Day Surgery    Pre-op Testing: CBC, CMP, PT/INR, urine culture    Need Pre-op Clearance: none    Estimated Post Op/Follow Up Appt: tbd.    January Lai MD

## 2024-02-16 NOTE — PROGRESS NOTES
2024  8:22 AM    Judd Montaño is a 38 year old female.    Chief complaint(s):   Chief Complaint   Patient presents with    Gyn Exam     HPI:     Judd Montaño primary complaint is regarding Gyne exam.     Shelbi Oneil is a 38 year old female present today for a routine periodic health gynecological screening.  Her last physical exam was 2 weeks ago. .lmp Patient's last menstrual period was 23.   Menarche occurred at age 14 .  She is currently using Nexplanon as a form of contraception.    Shelbi Oneil is G 2, P2, Ab 0.   She has a history of veneral infection significant for Chlamydia, HSV.   She performs breast self-exams monthly .  Her last TDAP (orTD) booster was 6 years ago. She is not current with her influenza immunization.  Her last Pap smear was 3 year(s) ago and was normal .  Her last mammogram was 6 year(s) ago and was normal.  Regarding colon cancer  screening test she underwent colonoscopy none.  None smoker.     HISTORY:  Past Medical History:   Diagnosis Date    Influenza 2019    IUD threads lost 2022    Numbness of left foot 2020    Palpitations 2015    Unintentional weight loss of more than 5% body weight within 1 month 2023    Reported 30lb weight loss in the last 1mo or so.  Only associated symptom is hair loss and some intermittent LUQ abdominal pain that radiates to the L back. No other associated symptoms      Pt uptodate on cervcial cancer screening.   Overdue for mammogram -ordered   Pt with significant family hx of breast cancer in sister x 2 in their 40s and paternal cousin dx with metastatic colon cancer at the      Past Surgical History:   Procedure Laterality Date      2005    CYST ASPIRATION LEFT  2016    ELECTROCARDIOGRAM, COMPLETE  2012    scanned to media      Family History   Problem Relation Age of Onset    Cancer Mother 42        brain cancer    Cancer Father 65         prostate ca    Migraines Sister     Breast Cancer Sister 33    Cancer Maternal Grandmother         brain cancer    Breast Cancer Paternal Cousin Female         age 40s    Breast Cancer Paternal Cousin Female         age 40s    Colon Cancer Paternal Cousin Male 36    Breast Cancer Half-Sister 44    Prostate Cancer Maternal Uncle     Prostate Cancer Maternal Uncle       Social History:   Social History     Socioeconomic History    Marital status:    Tobacco Use    Smoking status: Never    Smokeless tobacco: Never   Vaping Use    Vaping Use: Never used   Substance and Sexual Activity    Alcohol use: Yes     Comment: rarely    Drug use: No    Sexual activity: Yes     Partners: Male     Birth control/protection: Implant     Comment: nexplanon placed 12/8   Other Topics Concern    Caffeine Concern No        Immunizations:   Immunization History   Administered Date(s) Administered    FLULAVAL 6 months & older 0.5 ml Prefilled syringe (41800) 12/03/2022    FLUZONE 6 months and older PFS 0.5 ml (60890) 02/02/2024    TDAP 01/14/2016       Medications (Active prior to today's visit):  Current Outpatient Medications   Medication Sig Dispense Refill    ergocalciferol 1.25 MG (17929 UT) Oral Cap Take 1 capsule (50,000 Units total) by mouth once a week. 12 capsule 3    Mirabegron ER 50 MG Oral Tablet 24 Hr Take 1 tablet (50 mg total) by mouth daily. 90 tablet 3    omeprazole 20 MG Oral Capsule Delayed Release Take 1 capsule (20 mg total) by mouth every morning before breakfast.      acetaminophen-codeine (TYLENOL WITH CODEINE #3) 300-30 MG Oral Tab Take 1 tablet by mouth nightly as needed for Pain. (Patient not taking: Reported on 10/30/2023) 30 tablet 0       Allergies:  Allergies   Allergen Reactions    Amoxicillin RASH    Ibuprofen SHORTNESS OF BREATH         ROS:   Review of Systems   Constitutional:  Negative for appetite change and fever.   Eyes:  Negative for visual disturbance.   Respiratory:  Negative for shortness  of breath.    Cardiovascular:  Negative for chest pain.   Gastrointestinal:  Negative for abdominal pain, nausea and vomiting.   Genitourinary:  Negative for dysuria, hematuria, pelvic pain, vaginal bleeding, vaginal discharge and vaginal pain.   Musculoskeletal:  Negative for back pain.   Skin:  Negative for rash.   Neurological:  Negative for dizziness and headaches.       PHYSICAL EXAM:   VS: /85   Pulse 65   Ht 5' 4\" (1.626 m)   Wt 201 lb (91.2 kg)   LMP 12/16/2023 (Within Days)   BMI 34.50 kg/m²     Physical Exam  Vitals reviewed. Exam conducted with a chaperone present.   Constitutional:       General: She is not in acute distress.     Appearance: Normal appearance.   HENT:      Head: Normocephalic.   Eyes:      Conjunctiva/sclera: Conjunctivae normal.   Cardiovascular:      Rate and Rhythm: Normal rate.   Pulmonary:      Effort: Pulmonary effort is normal.   Genitourinary:     Labia:         Right: No rash.         Left: No rash.       Vagina: Normal.      Cervix: Normal.      Uterus: Normal.       Adnexa:         Right: No mass or tenderness.          Left: No mass or tenderness.     Musculoskeletal:      Cervical back: Neck supple.   Skin:     Findings: No rash.   Psychiatric:         Mood and Affect: Mood normal.         LABORATORY RESULTS:      Results for orders placed or performed in visit on 02/03/24   Comp Metabolic Panel (14)   Result Value Ref Range    Glucose 102 (H) 70 - 99 mg/dL    Sodium 139 136 - 145 mmol/L    Potassium 3.8 3.5 - 5.1 mmol/L    Chloride 107 98 - 112 mmol/L    CO2 23.0 21.0 - 32.0 mmol/L    Anion Gap 9 0 - 18 mmol/L    BUN 11 9 - 23 mg/dL    Creatinine 0.68 0.55 - 1.02 mg/dL    BUN/CREA Ratio 16.2 10.0 - 20.0    Calcium, Total 9.1 8.7 - 10.4 mg/dL    Calculated Osmolality 288 275 - 295 mOsm/kg    eGFR-Cr 114 >=60 mL/min/1.73m2    ALT 10 10 - 49 U/L    AST 11 <=34 U/L    Alkaline Phosphatase 72 37 - 98 U/L    Bilirubin, Total 0.5 0.3 - 1.2 mg/dL    Total Protein 7.0 5.7  - 8.2 g/dL    Albumin 4.1 3.2 - 4.8 g/dL    Globulin  2.9 2.8 - 4.4 g/dL    A/G Ratio 1.4 1.0 - 2.0    Patient Fasting for CMP? Yes    Hemoglobin A1C   Result Value Ref Range    HgbA1C 5.6 <5.7 %    Estimated Average Glucose 114 68 - 126 mg/dL   Lipid Panel   Result Value Ref Range    Cholesterol, Total 186 <200 mg/dL    HDL Cholesterol 43 40 - 59 mg/dL    Triglycerides 111 30 - 149 mg/dL    LDL Cholesterol 123 (H) <100 mg/dL    VLDL 20 0 - 30 mg/dL    Non HDL Chol 143 (H) <130 mg/dL    Patient Fasting for Lipid? Yes    TSH W Reflex To Free T4   Result Value Ref Range    TSH 0.937 0.550 - 4.780 mIU/mL   Vitamin D, 25-Hydroxy   Result Value Ref Range    Vitamin D, 25OH, Total 17.4 (L) 30.0 - 100.0 ng/mL   Urinalysis with Culture Reflex    Specimen: Urine   Result Value Ref Range    Urine Color Light-Yellow Yellow    Clarity Urine Clear Clear    Spec Gravity 1.024 1.005 - 1.030    Glucose Urine Normal Normal mg/dL    Bilirubin Urine Negative Negative    Ketones Urine Negative Negative mg/dL    Blood Urine 1+ (A) Negative    pH Urine 6.5 5.0 - 8.0    Protein Urine Negative Negative mg/dL    Urobilinogen Urine Normal Normal    Nitrite Urine Negative Negative    Leukocyte Esterase Urine Negative Negative    WBC Urine 1-5 0 - 5 /HPF    RBC Urine >10 (A) 0 - 2 /HPF    Bacteria Urine None Seen None Seen /HPF    Squamous Epi. Cells Few (A) None Seen /HPF    Renal Tubular Epithelial Cells None Seen None Seen /HPF    Transitional Cells None Seen None Seen /HPF    Yeast Urine None Seen None Seen /HPF   CBC W/ DIFFERENTIAL   Result Value Ref Range    WBC 5.5 4.0 - 11.0 x10(3) uL    RBC 4.23 3.80 - 5.30 x10(6)uL    HGB 13.1 12.0 - 16.0 g/dL    HCT 36.9 35.0 - 48.0 %    MCV 87.2 80.0 - 100.0 fL    MCH 31.0 26.0 - 34.0 pg    MCHC 35.5 31.0 - 37.0 g/dL    RDW-SD 37.9 35.1 - 46.3 fL    RDW 11.9 11.0 - 15.0 %    .0 150.0 - 450.0 10(3)uL    Neutrophil Absolute Prelim 3.14 1.50 - 7.70 x10 (3) uL    Neutrophil Absolute 3.14 1.50 -  7.70 x10(3) uL    Lymphocyte Absolute 1.85 1.00 - 4.00 x10(3) uL    Monocyte Absolute 0.41 0.10 - 1.00 x10(3) uL    Eosinophil Absolute 0.09 0.00 - 0.70 x10(3) uL    Basophil Absolute 0.03 0.00 - 0.20 x10(3) uL    Immature Granulocyte Absolute 0.02 0.00 - 1.00 x10(3) uL    Neutrophil % 56.7 %    Lymphocyte % 33.4 %    Monocyte % 7.4 %    Eosinophil % 1.6 %    Basophil % 0.5 %    Immature Granulocyte % 0.4 %       EKG / Spirometry : -     Radiology: No results found.     ASSESSMENT/PLAN:   Assessment   Encounter Diagnosis   Name Primary?    Gynecologic exam normal Yes       Assessment and Plan:     Judd Montaño Health checkup as follows:    LABORATORY & ORDERS:   Orders Placed This Encounter   Procedures    -II    ThinPrep PAP with HPV Reflex Request B    Chlamydia/Gc Amplification      RECOMMENDATIONS given include: ANTICIPATORY GUIDANCE  topics covered today include: safety (i.e. seat belts, helmets, sunscreen, protective sports gear ), nutrition (i.e. healthy meals and snacks (i.e. avoid junk food and high-carbohydrate foods); athletic conditioning, fluids; low fat milk, limit to less than 20 oz. a day; dental care with her dentist), and healthy habits & social competence & responsibilities: Recommendations on physical activity; exercise daily or at least 3 times a week for 30-60 minutes doing cardiovascular exercise. Patient educated on self breast examination to be done on a monthly basis.  Consider a  if over weight and/or having difficult in staying active. Attempt to keep a schedule that includes adequate sleep, and physical activities/exercise. Patient was educated on sexual transmitted disease. Best to abstain from sexual intercourse until she is ready to form a family. Use of condoms may prevent transmission of infections as well as pregnancy.   Contraception option chosen by patient was Nexplanon.  REFUSALS:  Although recommended, the patient refuses the following:  none .      FOLLOW-UP: Schedule a follow-up visit in 12 months.            Orders This Visit:  Orders Placed This Encounter   Procedures    -II    ThinPrep PAP with HPV Reflex Request B    Chlamydia/Gc Amplification       Meds This Visit:  Requested Prescriptions      No prescriptions requested or ordered in this encounter       Imaging & Referrals:  None         AFSANEH JOHNSON MD

## 2024-02-19 LAB
C TRACH DNA SPEC QL NAA+PROBE: NEGATIVE
N GONORRHOEA DNA SPEC QL NAA+PROBE: NEGATIVE

## 2024-03-04 ENCOUNTER — MED REC SCAN ONLY (OUTPATIENT)
Dept: FAMILY MEDICINE CLINIC | Facility: CLINIC | Age: 39
End: 2024-03-04

## 2024-03-04 LAB — HPV I/H RISK 1 DNA SPEC QL NAA+PROBE: NEGATIVE

## 2024-05-24 ENCOUNTER — TELEPHONE (OUTPATIENT)
Dept: FAMILY MEDICINE CLINIC | Facility: CLINIC | Age: 39
End: 2024-05-24

## 2024-05-24 NOTE — TELEPHONE ENCOUNTER
Patient scheduled nurse visit for TB. No  order on file, please advice.     Nurse visit today needs to be re-scheduled, office will be closed Monday due to the holiday. Test needs reading 72 hours (Monday)

## 2024-05-28 NOTE — TELEPHONE ENCOUNTER
Called patient no answer left message to call back. Patient is not in need of TB test if not required by employer.

## 2024-06-24 ENCOUNTER — OFFICE VISIT (OUTPATIENT)
Dept: FAMILY MEDICINE CLINIC | Facility: CLINIC | Age: 39
End: 2024-06-24

## 2024-06-24 VITALS
WEIGHT: 196.63 LBS | BODY MASS INDEX: 33.57 KG/M2 | SYSTOLIC BLOOD PRESSURE: 125 MMHG | HEIGHT: 64 IN | DIASTOLIC BLOOD PRESSURE: 77 MMHG | HEART RATE: 75 BPM

## 2024-06-24 DIAGNOSIS — M77.41 METATARSALGIA OF RIGHT FOOT: ICD-10-CM

## 2024-06-24 DIAGNOSIS — M72.2 PLANTAR FASCIITIS OF LEFT FOOT: Primary | ICD-10-CM

## 2024-06-24 PROCEDURE — 3074F SYST BP LT 130 MM HG: CPT

## 2024-06-24 PROCEDURE — 3078F DIAST BP <80 MM HG: CPT

## 2024-06-24 PROCEDURE — 99213 OFFICE O/P EST LOW 20 MIN: CPT

## 2024-06-24 PROCEDURE — 3008F BODY MASS INDEX DOCD: CPT

## 2024-06-24 RX ORDER — ACETAMINOPHEN 500 MG
1000 TABLET ORAL EVERY 6 HOURS PRN
Qty: 60 TABLET | Refills: 0 | Status: SHIPPED | OUTPATIENT
Start: 2024-06-24

## 2024-06-24 RX ORDER — METHYLPREDNISOLONE 4 MG/1
TABLET ORAL
Qty: 21 EACH | Refills: 0 | Status: SHIPPED | OUTPATIENT
Start: 2024-06-24

## 2024-06-24 NOTE — ASSESSMENT & PLAN NOTE
Left foot pain worse in am and after rest.   Worse with driving.   Tender plantar tendon.  Has tried orthotics and tylenol  Medrol dose pack  Educated on stretching exercises  Referral to pods as needed.  Follow up 2 weeks.

## 2024-06-24 NOTE — ASSESSMENT & PLAN NOTE
2/2 walking and favoring left plantar fascitis  Has tried orthotics and tylenol  Medrol dose pack  Educated on stretching exercises  Referral to pods as needed.  Follow up 2 weeks.

## 2024-06-24 NOTE — PROGRESS NOTES
Subjective:   Judd Montaño is a 39 year old female who presents for Foot Pain (Sole foot pain, past 2 months,  have changed shoes but still have pain and feet seem tired, even though she rests she still has pain, took tylenol but doesn't help)   Patient is a pleasant 39-year-old Gambian-speaking female with a past medical history consistent for carpal tunnel, panic attacks, and vitamin D deficiency.  Patient presents to office today for evaluation of foot pain.  Patient states via Dorothy [id number] She is having a constant pain in sole of foot, doesn't matter if she rests or goes to bed. Sometimes it is very painful and other times it is better. Left hurts more. This has been going on for 2 months. Denies injury or fall. No new exercise regimen. She has tried tylenol at home for pain. It helped a little bit. 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 Left foot pain started first and has since moved to right foot, ball area.         Past Medical History:    Influenza    IUD threads lost    Numbness of left foot    Palpitations    Unintentional weight loss of more than 5% body weight within 1 month    Reported 30lb weight loss in the last 1mo or so.  Only associated symptom is hair loss and some intermittent LUQ abdominal pain that radiates to the L back. No other associated symptoms      Pt uptodate on cervcial cancer screening.   Overdue for mammogram -ordered   Pt with significant family hx of breast cancer in sister x 2 in their 40s and paternal cousin dx with metastatic colon cancer at the      Past Surgical History:   Procedure Laterality Date      2005    Cyst aspiration left  2016    Electrocardiogram, complete  2012    scanned to media        History/Other:    Chief Complaint Reviewed and Verified  Nursing Notes Reviewed and   Verified  Tobacco Reviewed  Allergies Reviewed  Medications Reviewed    Medical History Reviewed  Surgical History Reviewed   OB Status Reviewed    Family History Reviewed  Social History Reviewed         Tobacco:  She has never smoked tobacco.    Current Outpatient Medications   Medication Sig Dispense Refill    methylPREDNISolone (MEDROL) 4 MG Oral Tablet Therapy Pack As directed. 21 each 0    acetaminophen (TYLENOL EXTRA STRENGTH) 500 MG Oral Tab Take 2 tablets (1,000 mg total) by mouth every 6 (six) hours as needed for Pain. 60 tablet 0    Mirabegron ER 50 MG Oral Tablet 24 Hr Take 1 tablet (50 mg total) by mouth daily. 90 tablet 3    omeprazole 20 MG Oral Capsule Delayed Release Take 1 capsule (20 mg total) by mouth every morning before breakfast.      ergocalciferol 1.25 MG (95300 UT) Oral Cap Take 1 capsule (50,000 Units total) by mouth once a week. (Patient not taking: Reported on 6/24/2024) 12 capsule 3    acetaminophen-codeine (TYLENOL WITH CODEINE #3) 300-30 MG Oral Tab Take 1 tablet by mouth nightly as needed for Pain. (Patient not taking: Reported on 6/24/2024) 30 tablet 0         Review of Systems:  Review of Systems   Constitutional: Negative.  Negative for activity change, appetite change, chills and fever.   HENT: Negative.  Negative for congestion, postnasal drip, rhinorrhea, sore throat, tinnitus and voice change.    Eyes: Negative.    Respiratory: Negative.  Negative for apnea, cough, chest tightness and shortness of breath.    Cardiovascular:  Negative for chest pain and leg swelling.   Gastrointestinal: Negative.  Negative for abdominal pain, anal bleeding and blood in stool.   Genitourinary: Negative.  Negative for difficulty urinating, flank pain and menstrual problem.   Musculoskeletal:  Positive for arthralgias, gait problem and myalgias. Negative for joint swelling.   Skin: Negative.    Neurological:  Negative for dizziness and headaches.   Psychiatric/Behavioral: Negative.  Negative for agitation.          Objective:   /77 (BP Location: Right arm, Patient Position: Sitting, Cuff Size: large)   Pulse 75    Ht 5' 4\" (1.626 m)   Wt 196 lb 9.6 oz (89.2 kg)   LMP 05/16/2024 (Within Days)   BMI 33.75 kg/m²  Estimated body mass index is 33.75 kg/m² as calculated from the following:    Height as of this encounter: 5' 4\" (1.626 m).    Weight as of this encounter: 196 lb 9.6 oz (89.2 kg).  Physical Exam  Vitals and nursing note reviewed.   Constitutional:       General: She is not in acute distress.     Appearance: Normal appearance. She is well-developed and normal weight.   HENT:      Head: Normocephalic and atraumatic.      Right Ear: External ear normal.      Left Ear: External ear normal.   Neck:      Thyroid: No thyromegaly.   Cardiovascular:      Rate and Rhythm: Normal rate and regular rhythm.      Pulses: Normal pulses.      Heart sounds: Normal heart sounds. No murmur heard.  Pulmonary:      Effort: Pulmonary effort is normal. No respiratory distress.      Breath sounds: Normal breath sounds. No wheezing or rales.   Chest:      Chest wall: No tenderness.   Abdominal:      General: Bowel sounds are normal. There is no distension.      Palpations: Abdomen is soft.      Tenderness: There is no abdominal tenderness.   Musculoskeletal:         General: No tenderness. Normal range of motion.      Cervical back: Normal range of motion and neck supple.        Feet:    Feet:      Comments: Pain worse in am in listed area. Pain better with repeated movements. Pain better with stretching and pain meds. Has tried orthotics.   Lymphadenopathy:      Cervical: No cervical adenopathy.   Skin:     General: Skin is warm and dry.      Capillary Refill: Capillary refill takes less than 2 seconds.      Findings: No rash.   Neurological:      Mental Status: She is alert and oriented to person, place, and time.      Coordination: Coordination normal.   Psychiatric:         Behavior: Behavior normal.         Thought Content: Thought content normal.         Judgment: Judgment normal.           Assessment & Plan:   1. Plantar fasciitis  of left foot (Primary)  Assessment & Plan:  Left foot pain worse in am and after rest.   Worse with driving.   Tender plantar tendon.  Has tried orthotics and tylenol  Medrol dose pack  Educated on stretching exercises  Referral to pods as needed.  Follow up 2 weeks.  Orders:  -     methylPREDNISolone; As directed.  Dispense: 21 each; Refill: 0  -     Acetaminophen; Take 2 tablets (1,000 mg total) by mouth every 6 (six) hours as needed for Pain.  Dispense: 60 tablet; Refill: 0  2. Metatarsalgia of right foot  Assessment & Plan:  2/2 walking and favoring left plantar fascitis  Has tried orthotics and tylenol  Medrol dose pack  Educated on stretching exercises  Referral to pods as needed.  Follow up 2 weeks.  Orders:  -     methylPREDNISolone; As directed.  Dispense: 21 each; Refill: 0  -     Acetaminophen; Take 2 tablets (1,000 mg total) by mouth every 6 (six) hours as needed for Pain.  Dispense: 60 tablet; Refill: 0        Return in about 2 weeks (around 7/8/2024).    KUN Perla, 6/24/2024, 4:43 PM

## 2024-06-28 ENCOUNTER — TELEPHONE (OUTPATIENT)
Dept: FAMILY MEDICINE CLINIC | Facility: CLINIC | Age: 39
End: 2024-06-28

## 2024-06-28 DIAGNOSIS — Z00.00 PHYSICAL EXAM: Primary | ICD-10-CM

## 2024-06-28 NOTE — TELEPHONE ENCOUNTER
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 She also states the sister that was + TB had some TB symptoms -- cough about a month ago and went to provider and was diagnosed with TB, but sure if it was active or latent. She was born in [place]. She did have BCG vaccination as a child. TB flowsheet completed --> see flowsheet. She denies having any cough, fever/chills, current weight loss. She has had night sweats for about 2 weeks. She states she had significant weight loss was a year ago without trying --> about 30 lbs in about 1 month, along with decreased appetite. Fatigue is currently present for 3 weeks - she states she has had some pain of both legs from knees down [states she was evaluated and was given oral steroids]. She was made aware of difference between Active TB and Latent TB; she is aware TB can be anywhere in the body and is contagious if Active TB in the respiratory tract. She will be calling [facility] for evaluation and recommendations --> # provided. She is also requesting a Quantiferon TB Blood test now. I made her aware I will convey the above to Dr. Argueta [pod-mate, as Dr. Argueta is not in the office]. Patient verbalized understanding. No further questions or concerns at this time.    Martir Garay PA-C [pod-mate for Dr. Argueta] to review and advise; pended requested order for Quantiferon TB test - sign if appropriate

## 2024-06-28 NOTE — TELEPHONE ENCOUNTER
[Dr. Argueta signed off on pended order]    Patient contacted and made aware of order placed. She will be going to get labs performed today or tomorrow. Patient verbalized understanding. No further questions or concerns at this time.

## 2024-06-28 NOTE — TELEPHONE ENCOUNTER
Patient called to request an Order for a TB test. Patient states 2 of her sisters who work and live with her have tested positive and sisters were advised to notify anyone who lives/works with them to get tested.     Patient is also requesting an Order for a Mammogram.

## 2024-07-01 ENCOUNTER — LAB ENCOUNTER (OUTPATIENT)
Dept: LAB | Age: 39
End: 2024-07-01
Attending: FAMILY MEDICINE
Payer: COMMERCIAL

## 2024-07-01 ENCOUNTER — TELEPHONE (OUTPATIENT)
Dept: FAMILY MEDICINE CLINIC | Facility: CLINIC | Age: 39
End: 2024-07-01

## 2024-07-01 DIAGNOSIS — Z00.00 PHYSICAL EXAM: ICD-10-CM

## 2024-07-01 PROCEDURE — 86480 TB TEST CELL IMMUN MEASURE: CPT

## 2024-07-01 PROCEDURE — 36415 COLL VENOUS BLD VENIPUNCTURE: CPT

## 2024-07-01 NOTE — TELEPHONE ENCOUNTER
With Language Line  Asim   ID # [id number]    Patient calling ( name and date of birth of patient verified ) asking about her Mammogram     Patient has a pain in her left breast where she has cysts and radiates to her left axilla, has been treated for  this in the past     Mammogram order is in place from Dr Marr , 10/23, call transferred to Central scheduling for assist

## 2024-07-01 NOTE — TELEPHONE ENCOUNTER
Patient is requesting a mammogram order.  Patient notes she has tried scheduling and is at the site to schedule the appointment and was advised they do not have the orders in place.    Patient is also noting pain on breast.    Call connected to RN.    Please advise.

## 2024-07-05 LAB
M TB IFN-G CD4+ T-CELLS BLD-ACNC: 0.01 IU/ML
M TB TUBERC IFN-G BLD QL: NEGATIVE
M TB TUBERC IGNF/MITOGEN IGNF CONTROL: >10 IU/ML
QFT TB1 AG MINUS NIL: 0.02 IU/ML
QFT TB2 AG MINUS NIL: 0.03 IU/ML

## 2024-07-15 ENCOUNTER — OFFICE VISIT (OUTPATIENT)
Dept: FAMILY MEDICINE CLINIC | Facility: CLINIC | Age: 39
End: 2024-07-15

## 2024-07-15 VITALS
SYSTOLIC BLOOD PRESSURE: 126 MMHG | BODY MASS INDEX: 33.46 KG/M2 | DIASTOLIC BLOOD PRESSURE: 73 MMHG | HEIGHT: 64 IN | TEMPERATURE: 98 F | RESPIRATION RATE: 16 BRPM | OXYGEN SATURATION: 97 % | HEART RATE: 77 BPM | WEIGHT: 196 LBS

## 2024-07-15 DIAGNOSIS — R10.9 ABDOMINAL PAIN, UNSPECIFIED ABDOMINAL LOCATION: ICD-10-CM

## 2024-07-15 DIAGNOSIS — R10.12 LEFT UPPER QUADRANT ABDOMINAL PAIN: Primary | ICD-10-CM

## 2024-07-15 PROCEDURE — 3074F SYST BP LT 130 MM HG: CPT

## 2024-07-15 PROCEDURE — 3078F DIAST BP <80 MM HG: CPT

## 2024-07-15 PROCEDURE — 99213 OFFICE O/P EST LOW 20 MIN: CPT

## 2024-07-15 PROCEDURE — 3008F BODY MASS INDEX DOCD: CPT

## 2024-07-15 RX ORDER — PANTOPRAZOLE SODIUM 40 MG/1
40 TABLET, DELAYED RELEASE ORAL
Qty: 30 TABLET | Refills: 0 | Status: SHIPPED | OUTPATIENT
Start: 2024-07-15

## 2024-07-15 NOTE — PROGRESS NOTES
HPI:    Patient ID: Judd Montaño is a 39 year old female.    HPI     Patient here in office with complaint of left upper abdominal pain with bloating and nausea, started 2 weeks ago.  No aggravating factors.  Denies vomiting, constipation, or diarrhea.  Taking omeprazole 20 mg daily which is not helping.          Review of Systems   Constitutional: Negative.    Respiratory: Negative.     Cardiovascular: Negative.    Gastrointestinal:  Positive for abdominal pain and nausea. Negative for blood in stool, diarrhea and vomiting.   Skin: Negative.    Neurological: Negative.    Psychiatric/Behavioral: Negative.              Current Outpatient Medications   Medication Sig Dispense Refill    pantoprazole 40 MG Oral Tab EC Take 1 tablet (40 mg total) by mouth every morning before breakfast. 30 tablet 0    acetaminophen (TYLENOL EXTRA STRENGTH) 500 MG Oral Tab Take 2 tablets (1,000 mg total) by mouth every 6 (six) hours as needed for Pain. 60 tablet 0    Mirabegron ER 50 MG Oral Tablet 24 Hr Take 1 tablet (50 mg total) by mouth daily. 90 tablet 3     Allergies:  Allergies   Allergen Reactions    Amoxicillin RASH    Ibuprofen SHORTNESS OF BREATH      /73   Pulse 77   Temp 97.7 °F (36.5 °C) (Temporal)   Resp 16   Ht 5' 4\" (1.626 m)   Wt 196 lb (88.9 kg)   LMP 05/16/2024 (Within Days)   SpO2 97%   BMI 33.64 kg/m²   Body mass index is 33.64 kg/m².  PHYSICAL EXAM:   Physical Exam  Vitals reviewed.   Constitutional:       General: She is not in acute distress.     Appearance: Normal appearance. She is not ill-appearing.   Cardiovascular:      Rate and Rhythm: Normal rate and regular rhythm.      Heart sounds: Normal heart sounds. No murmur heard.     No friction rub. No gallop.   Pulmonary:      Effort: Pulmonary effort is normal. No respiratory distress.      Breath sounds: Normal breath sounds. No stridor. No wheezing, rhonchi or rales.   Chest:      Chest wall: No tenderness.   Abdominal:       General: Bowel sounds are normal. There is no distension.      Palpations: Abdomen is soft. There is no mass.      Tenderness: There is abdominal tenderness. There is no guarding or rebound.      Hernia: No hernia is present.      Comments: Left upper quadrant/bilateral lower abdominal tenderness   Skin:     General: Skin is warm.      Findings: No rash.   Neurological:      General: No focal deficit present.      Mental Status: She is alert and oriented to person, place, and time.   Psychiatric:         Mood and Affect: Mood normal.         Behavior: Behavior normal.         Thought Content: Thought content normal.         Judgment: Judgment normal.                ASSESSMENT/PLAN:   1. Left upper quadrant abdominal pain   -Stop omeprazole 20 mg daily  - Start pantoprazole 40 mg daily  - H. Pylori Stool Ag, EIA [E]; Future    2. Abdominal pain, unspecified abdominal location  -Suspect IBS versus other  - Advised probiotic over-the-counter daily  - Discussed FODMAP diet, handout given  - Will consider referral to gastroenterology if symptoms do not improve      Orders Placed This Encounter   Procedures    H. Pylori Stool Ag, EIA [E]       Meds This Visit:  Requested Prescriptions     Signed Prescriptions Disp Refills    pantoprazole 40 MG Oral Tab EC 30 tablet 0     Sig: Take 1 tablet (40 mg total) by mouth every morning before breakfast.       Imaging & Referrals:  None         ID#[id number]

## 2024-07-17 ENCOUNTER — HOSPITAL ENCOUNTER (OUTPATIENT)
Dept: MAMMOGRAPHY | Facility: HOSPITAL | Age: 39
Discharge: HOME | End: 2024-07-17
Attending: SURGERY
Payer: COMMERCIAL

## 2024-07-17 ENCOUNTER — HOSPITAL ENCOUNTER (OUTPATIENT)
Dept: ULTRASOUND IMAGING | Facility: HOSPITAL | Age: 39
Discharge: HOME | End: 2024-07-17
Attending: SURGERY
Payer: COMMERCIAL

## 2024-07-17 DIAGNOSIS — N60.02 BENIGN CYST OF LEFT BREAST: ICD-10-CM

## 2024-07-17 PROCEDURE — 77066 DX MAMMO INCL CAD BI: CPT | Performed by: SURGERY

## 2024-07-17 PROCEDURE — 76642 ULTRASOUND BREAST LIMITED: CPT | Performed by: SURGERY

## 2024-07-17 PROCEDURE — 77062 BREAST TOMOSYNTHESIS BI: CPT | Performed by: SURGERY

## 2024-07-22 ENCOUNTER — TELEPHONE (OUTPATIENT)
Dept: SURGERY | Facility: CLINIC | Age: 39
End: 2024-07-22

## 2024-07-22 ENCOUNTER — TELEPHONE (OUTPATIENT)
Dept: FAMILY MEDICINE CLINIC | Facility: CLINIC | Age: 39
End: 2024-07-22

## 2024-07-22 NOTE — TELEPHONE ENCOUNTER
Language line Eulalio ID # [id number], Identified patient's name and .  Patient called for her TB results. RN relayed that Dr. Argueta has not reviewed them but they appear to be negative.     Quantiferon TB Result  Negative Negative

## 2024-07-22 NOTE — TELEPHONE ENCOUNTER
Called patient using the language line. Ed (ID# [id number]) translated for me in Cymraes. As per  recommendation informed patient that she needs to have her MRI test done . Asked patient to call the  to schedule her appointment after her MRI is done. Answered patient question to the best of my ability with the help of the . Patient verbalized understanding. Will contact us if she has any further questions.

## 2024-08-20 ENCOUNTER — HOSPITAL ENCOUNTER (OUTPATIENT)
Dept: MRI IMAGING | Facility: HOSPITAL | Age: 39
Discharge: HOME | End: 2024-08-20
Attending: SURGERY
Payer: COMMERCIAL

## 2024-08-20 DIAGNOSIS — R92.8 ABNORMAL MRI, BREAST: ICD-10-CM

## 2024-08-20 PROCEDURE — 77049 MRI BREAST C-+ W/CAD BI: CPT | Performed by: SURGERY

## 2024-08-20 PROCEDURE — A9575 INJ GADOTERATE MEGLUMI 0.1ML: HCPCS | Performed by: SURGERY

## 2024-08-20 RX ORDER — GADOTERATE MEGLUMINE 376.9 MG/ML
20 INJECTION INTRAVENOUS
Status: COMPLETED | OUTPATIENT
Start: 2024-08-20 | End: 2024-08-20

## 2024-08-20 RX ADMIN — GADOTERATE MEGLUMINE 18 ML: 376.9 INJECTION INTRAVENOUS at 11:12:00

## 2024-08-21 ENCOUNTER — TELEPHONE (OUTPATIENT)
Dept: SURGERY | Facility: CLINIC | Age: 39
End: 2024-08-21

## 2024-08-21 DIAGNOSIS — R92.8 ABNORMAL MRI, BREAST: Primary | ICD-10-CM

## 2024-08-21 NOTE — TELEPHONE ENCOUNTER
Called patient with the language line as the patient only speaks Turkish, informed patient that per Dr. Marr patient should have another ultrasound to have second look at her right breast as suggested by radiology. Answered patient question to the best of my ability. Patient verbalized understanding and will contact our office if have any further questions.

## 2024-08-23 ENCOUNTER — HOSPITAL ENCOUNTER (OUTPATIENT)
Dept: ULTRASOUND IMAGING | Facility: HOSPITAL | Age: 39
Discharge: HOME | End: 2024-08-23
Payer: COMMERCIAL

## 2024-08-23 DIAGNOSIS — R92.8 ABNORMAL MRI, BREAST: ICD-10-CM

## 2024-08-23 PROCEDURE — 76642 ULTRASOUND BREAST LIMITED: CPT

## 2024-10-17 ENCOUNTER — OFFICE VISIT (OUTPATIENT)
Dept: FAMILY MEDICINE CLINIC | Facility: CLINIC | Age: 39
End: 2024-10-17

## 2024-10-17 VITALS
BODY MASS INDEX: 31.58 KG/M2 | DIASTOLIC BLOOD PRESSURE: 78 MMHG | SYSTOLIC BLOOD PRESSURE: 126 MMHG | HEIGHT: 64 IN | WEIGHT: 185 LBS | HEART RATE: 77 BPM

## 2024-10-17 DIAGNOSIS — I83.90 VARICOSE VEIN: ICD-10-CM

## 2024-10-17 DIAGNOSIS — M72.2 PLANTAR FASCIITIS: Primary | ICD-10-CM

## 2024-10-17 PROCEDURE — 3008F BODY MASS INDEX DOCD: CPT | Performed by: FAMILY MEDICINE

## 2024-10-17 PROCEDURE — 3078F DIAST BP <80 MM HG: CPT | Performed by: FAMILY MEDICINE

## 2024-10-17 PROCEDURE — 3074F SYST BP LT 130 MM HG: CPT | Performed by: FAMILY MEDICINE

## 2024-10-17 PROCEDURE — 99214 OFFICE O/P EST MOD 30 MIN: CPT | Performed by: FAMILY MEDICINE

## 2024-10-17 RX ORDER — PANTOPRAZOLE SODIUM 40 MG/1
40 TABLET, DELAYED RELEASE ORAL
Qty: 30 TABLET | Refills: 3 | Status: SHIPPED | OUTPATIENT
Start: 2024-10-17

## 2024-10-17 RX ORDER — NAPROXEN 250 MG/1
250 TABLET ORAL 2 TIMES DAILY WITH MEALS
Qty: 60 TABLET | Refills: 1 | Status: SHIPPED | OUTPATIENT
Start: 2024-10-17

## 2024-10-17 NOTE — PROGRESS NOTES
10/17/2024  2:54 PM    Judd Montaño is a 39 year old female.    Chief complaint(s):   Chief Complaint   Patient presents with    Heel Pain     Pt reports pain on both heels and legs.     Varicose veins  HPI:     Judd Montaño primary complaint is regarding as above.     Patient is a 39-year-old female who presents complaining of bilateral heel pains.  It starts from the arch down the heel and up the posterior of the heels and distal tibia.  Denies any trauma accidents or injuries.  She has a history of heel spurs in the past.  They have tried injections and steroids with no improvement.    In addition she is complaining of lower extremity varicose veins which are getting worse.  Denies any history of the vein thrombosis.  There has been no lower extremity swelling.  He is requesting referral to see a specialist to help with the varicose veins.      HISTORY:  Past Medical History:    Influenza    IUD threads lost    Numbness of left foot    Palpitations    Unintentional weight loss of more than 5% body weight within 1 month    Reported 30lb weight loss in the last 1mo or so.  Only associated symptom is hair loss and some intermittent LUQ abdominal pain that radiates to the L back. No other associated symptoms      Pt uptodate on cervcial cancer screening.   Overdue for mammogram -ordered   Pt with significant family hx of breast cancer in sister x 2 in their 40s and paternal cousin dx with metastatic colon cancer at the      Past Surgical History:   Procedure Laterality Date          Cyst aspiration left  2016    Electrocardiogram, complete  2012    scanned to media      Family History   Problem Relation Age of Onset    Cancer Mother 42        brain cancer    Cancer Father 65        prostate ca    Migraines Sister     Breast Cancer Sister 33    Cancer Maternal Grandmother         brain cancer    Breast Cancer Paternal Cousin Female         age 40s    Breast  Cancer Paternal Cousin Female         age 40s    Colon Cancer Paternal Cousin Male 36    Breast Cancer Half-Sister 44    Prostate Cancer Maternal Uncle     Prostate Cancer Maternal Uncle       Social History:   Social History     Socioeconomic History    Marital status:    Tobacco Use    Smoking status: Never     Passive exposure: Never    Smokeless tobacco: Never   Vaping Use    Vaping status: Never Used   Substance and Sexual Activity    Alcohol use: Yes     Comment: rarely    Drug use: No    Sexual activity: Yes     Partners: Male     Birth control/protection: Implant     Comment: nexplanon placed 12/8   Other Topics Concern    Caffeine Concern No        Immunizations:   Immunization History   Administered Date(s) Administered    FLULAVAL 6 months & older 0.5 ml Prefilled syringe (87226) 12/03/2022    FLUZONE 6 months and older PFS 0.5 ml (78929) 02/02/2024    TDAP 01/14/2016       Medications (Active prior to today's visit):  Current Outpatient Medications   Medication Sig Dispense Refill    naproxen 250 MG Oral Tab Take 1 tablet (250 mg total) by mouth 2 (two) times daily with meals. 60 tablet 1    pantoprazole 40 MG Oral Tab EC Take 1 tablet (40 mg total) by mouth every morning before breakfast. 30 tablet 3    pantoprazole 40 MG Oral Tab EC Take 1 tablet (40 mg total) by mouth every morning before breakfast. 30 tablet 0    acetaminophen (TYLENOL EXTRA STRENGTH) 500 MG Oral Tab Take 2 tablets (1,000 mg total) by mouth every 6 (six) hours as needed for Pain. 60 tablet 0       Allergies:  Allergies[1]      ROS:   Review of Systems   Constitutional:  Negative for appetite change and fever.   Eyes:  Negative for visual disturbance.   Respiratory:  Negative for shortness of breath.    Cardiovascular:  Negative for chest pain.        LEs varicose veins   Gastrointestinal:  Negative for abdominal pain, nausea and vomiting.   Musculoskeletal:  Negative for back pain.        Bilateral heel pains   Skin:  Negative  for rash.   Neurological:  Negative for dizziness and headaches.       PHYSICAL EXAM:   VS: /78 (BP Location: Right arm, Patient Position: Sitting, Cuff Size: large)   Pulse 77   Ht 5' 4\" (1.626 m)   Wt 185 lb (83.9 kg)   LMP 10/01/2024 (Within Days)   BMI 31.76 kg/m²     Physical Exam  Vitals reviewed.   Constitutional:       General: She is not in acute distress.     Appearance: Normal appearance.   HENT:      Head: Normocephalic.   Eyes:      Conjunctiva/sclera: Conjunctivae normal.   Cardiovascular:      Rate and Rhythm: Normal rate.   Pulmonary:      Effort: Pulmonary effort is normal.   Musculoskeletal:      Cervical back: Neck supple.   Feet:      Comments: + medial heel tenderness   Lymphadenopathy:      Comments: LEs varicose veins R>L   Skin:     Findings: No rash.   Psychiatric:         Mood and Affect: Mood normal.         LABORATORY RESULTS:     EKG / Spirometry : -     Radiology: No results found.     ASSESSMENT/PLAN:   Assessment   Encounter Diagnoses   Name Primary?    Plantar fasciitis Yes    Varicose vein        1. Plantar fasciitis    MEDICATIONS:     Requested Prescriptions     Signed Prescriptions Disp Refills    naproxen 250 MG Oral Tab 60 tablet 1     Sig: Take 1 tablet (250 mg total) by mouth 2 (two) times daily with meals.    pantoprazole 40 MG Oral Tab EC 30 tablet 3     Sig: Take 1 tablet (40 mg total) by mouth every morning before breakfast.     REFERRALS: PHYSICAL THERAPY - INTERNAL  VASCULAR SURGERY - INTERNAL,       Procedures    PHYSICAL THERAPY - INTERNAL        RECOMMENDATIONS given include: Patient was reassured of  her medical condition and all questions and concerns were answered. Patient was informed to please, call our office with any new or further questions or concerns that may come up in the near future. Notify Dr Brown or the [facility] if there is a deterioration or worsening of the medical condition. Also, inform the doctor with any new symptoms or  medications' side effects.      FOLLOW-UP: Schedule a follow-up visit in 4 weeks/ prn.         2. Varicose vein    Referral:  VASCULAR SURGERY - INTERNAL   Use elastic stockings       Orders This Visit:  No orders of the defined types were placed in this encounter.      Meds This Visit:  Requested Prescriptions     Signed Prescriptions Disp Refills    naproxen 250 MG Oral Tab 60 tablet 1     Sig: Take 1 tablet (250 mg total) by mouth 2 (two) times daily with meals.    pantoprazole 40 MG Oral Tab EC 30 tablet 3     Sig: Take 1 tablet (40 mg total) by mouth every morning before breakfast.       Imaging & Referrals:  PHYSICAL THERAPY - INTERNAL  VASCULAR SURGERY - INTERNAL         AFSANEH JOHNSON MD         [1]   Allergies  Allergen Reactions    Amoxicillin RASH    Ibuprofen SHORTNESS OF BREATH

## 2024-10-18 ENCOUNTER — TELEPHONE (OUTPATIENT)
Dept: FAMILY MEDICINE CLINIC | Facility: CLINIC | Age: 39
End: 2024-10-18

## 2024-10-18 NOTE — TELEPHONE ENCOUNTER
With assist of language kimberly/Khadar  Pt stated she was seen yesterday with Dr echols, Rx Naproxen  ordered , was advised by Pharmacy she's allergic to Ibuprofen and should not take Naproxen     out of office today  Sent to Pod mate

## 2024-10-18 NOTE — TELEPHONE ENCOUNTER
May try Acetaminophen, OTC. If she has history all allergy to Tylenol then don't started, acetaminophen.

## 2024-10-18 NOTE — TELEPHONE ENCOUNTER
Dr Brown==see Paris's note below and advise, thanks. She will be waiting for your recommendation tomorrow Saturday.    DR Argueta==please disregard this message as it was sent by mistake.      With Language Line  Candi#[id number] .  Informed patient regarding Dr Spring's note below.   Informed that Dr. Brown is not in the office today, Friday, but he will be back tomorrow, Saturday.

## 2024-10-19 NOTE — TELEPHONE ENCOUNTER
Spoke with patient, Date of Birth verified.  Patient declined .   Patient was informed of MD recommendation, she stated understanding, she is not pregnant, she is not allergic to acetaminophen/ Tylenol.   She will start PT next week and keep us updated.     FYI        Future Appointments   Date Time Provider Department Center   12/2/2024 10:15 AM Niyah Avery APRN [facility]

## 2024-10-23 ENCOUNTER — OFFICE VISIT (OUTPATIENT)
Facility: CLINIC | Age: 39
End: 2024-10-23

## 2024-10-23 ENCOUNTER — TELEPHONE (OUTPATIENT)
Dept: FAMILY MEDICINE CLINIC | Facility: CLINIC | Age: 39
End: 2024-10-23

## 2024-10-23 VITALS — WEIGHT: 185 LBS | HEIGHT: 64 IN | BODY MASS INDEX: 31.58 KG/M2 | RESPIRATION RATE: 20 BRPM

## 2024-10-23 DIAGNOSIS — I83.813 VARICOSE VEINS OF BILATERAL LOWER EXTREMITIES WITH PAIN: Primary | ICD-10-CM

## 2024-10-23 NOTE — PATIENT INSTRUCTIONS
Medical Grade Compression Vendor Locations:  Please call and make an appointment for your compression stockings.  The list below are different vendors who have compression stockings available.  Patients under HMO insurance should get a primary physician referral prior to getting fitted.   Some of the following fitted clinics accept insurance or Self Pay:    [facility]         BCBS PPO                 BCBS HMO                 [employer] PPO  [address], [place]  Phone # [phone]  Fax # [phone]    [facility]  Insurance Plans  $85-$150  Payment Plans purchase over $200  [address]   [address]  [place], [place]  Phone # [phone]  Fax # [phone]    [facility]  $32.99-$100  [address], [place]  [phone]    [facility]  $40-$120                 [address] [place], [place]  [phone] Ext # [phone]    [facility]  $50-$100  [address]  [place], [place]  [phone]    [facility]  [address], [place]  [phone] ([employer]) Medicare Insurance accepted

## 2024-10-23 NOTE — PROGRESS NOTES
VASCULAR SURGERY CONSULT NOTE      Judd Montaño   :  5/3/1985  MR#  [id number]    REFERRING PHYSICIAN:  Barney Brown  PRIMARY CARE PHYSICIAN:  David Argueta DO    Chief Complaint   Patient presents with    Consult    Varicose Veins     Patient is c/o itching, painful, small sores, cramping, burning on bilateral leg for four year worst for one year  No compressions use  No testing  No DM II  No vein procedures or surgeries  No blockages Hx       HPI:    The patient is a 39 year old female who has been referred to the clinic today for an evaluation of her bilateral lower extremity heaviness, tiredness, itchiness, edema and pain after prolonged standing (left worse than right). The pain is reported in her medial thighs, calves, and anterior distal legs. This is interfering with her activities of daily living and exercise. She has not worn compression stockings in the recent past. No history of endovenous ablations performed in the past.     PAST MEDICAL HISTORY:     Past Medical History:    Influenza    IUD threads lost    Numbness of left foot    Palpitations    Unintentional weight loss of more than 5% body weight within 1 month    Reported 30lb weight loss in the last 1mo or so.  Only associated symptom is hair loss and some intermittent LUQ abdominal pain that radiates to the L back. No other associated symptoms      Pt uptodate on cervcial cancer screening.   Overdue for mammogram -ordered   Pt with significant family hx of breast cancer in sister x 2 in their 40s and paternal cousin dx with metastatic colon cancer at the       PAST SURGICAL HISTORY:     Past Surgical History:   Procedure Laterality Date      2005    Cyst aspiration left  2016    Electrocardiogram, complete  2012    scanned to media        MEDICATIONS:     Current Outpatient Medications   Medication Sig Dispense Refill    naproxen 250 MG Oral Tab Take 1 tablet (250 mg total) by mouth 2 (two) times  daily with meals. 60 tablet 1    pantoprazole 40 MG Oral Tab EC Take 1 tablet (40 mg total) by mouth every morning before breakfast. 30 tablet 3    acetaminophen (TYLENOL EXTRA STRENGTH) 500 MG Oral Tab Take 2 tablets (1,000 mg total) by mouth every 6 (six) hours as needed for Pain. 60 tablet 0       ALLERGIES:   Allergies[1]    SOCIAL HISTORY:     Social History     Socioeconomic History    Marital status:    Tobacco Use    Smoking status: Never     Passive exposure: Never    Smokeless tobacco: Never   Vaping Use    Vaping status: Never Used   Substance and Sexual Activity    Alcohol use: Yes     Comment: rarely    Drug use: No    Sexual activity: Yes     Partners: Male     Birth control/protection: Implant     Comment: nexplanon placed 12/8   Other Topics Concern    Caffeine Concern No        FAMILY HISTORY:     Family History   Problem Relation Age of Onset    Cancer Mother 42        brain cancer    Cancer Father 65        prostate ca    Migraines Sister     Breast Cancer Sister 33    Cancer Maternal Grandmother         brain cancer    Breast Cancer Paternal Cousin Female         age 40s    Breast Cancer Paternal Cousin Female         age 40s    Colon Cancer Paternal Cousin Male 36    Breast Cancer Half-Sister 44    Prostate Cancer Maternal Uncle     Prostate Cancer Maternal Uncle        ROS:   A 12 point review of systems with pertinent positives and negatives listed in the HPI.    PHYSICAL EXAM:   Resp 20   Ht 5' 4\" (1.626 m)   Wt 185 lb (83.9 kg)   LMP 10/01/2024 (Within Days)   BMI 31.76 kg/m²   GENERAL: alert and orientated X 3, well developed, well nourished, in no apparent distress  HEENT: ears and throat are clear  NECK: supple, no lymphadenopathy, thyroid wnl  CAROTID: No bruits  RESPIRATORY: no rales, rhonchi, or wheezes B  CARDIO: RRR without murmur, no murmur, no gallop   ABDOMEN: soft, non-tender with no palpable aneurysm or masses  BACK: normal, no tenderness  SKIN: no rashes, warm and  dry  NEURO/PSYCH: orientated x3, normal mood and affect, no sensory or motor deficit  EXTREMITIES: full range of motion, no tenderness or edema in either leg.   VASCULAR  Pulse exam right: femoral 2+, DP  2+, PT  2+  Pulse exam left: femoral  2+, DP  2+, PT  2+      Vein Assessment:      Mild scattered bilateral  LE varicose veins (bulgy on the left) with no significant hemosiderin deposition.   Multiple spider veins noted in both thighs and calves.      IMPRESSION:   Bilateral  lower extremity pain due to venous insufficiency.   Symptomatic calf varicosities.    PLAN:     We reviewed the options for management for venous insufficiency, including conservative therapy, sclerotherapy, stab phlebectomy, and endovenous laser ablation. The patient was educated in the benign condition of venous disease.  I have given the patient a prescription for Grade II compression stockings (20-30 mmHg, thigh-highs). We reviewed the importance of wearing these daily and consistently. We also reviewed other types of conservative measures, such as periodic leg elevation, walking for exercise, analgesic use, attempts at weight loss, and the avoidance of prolonged standing.  I have sent the patient for a venous reflux ultrasound. Should the ultrasound study reveal venous reflux with dilation and she does not have relief of symptoms with conservative therapy, then endovenous laser ablation may be a possible treatment option.  I explained to the patient that her insurance company may require a 6-12 week trial period of conservative therapy prior to authorizing endovenous ablation treatment.  The patient understood and agreed to proceed with this treatment plan, all of her questions were answered during this clinic visit. She was asked to FU in several weeks to assess her response to conservative treatment.      Thank you for allowing to participate in the care of your patient.     KUNAL Roblero  Division of Vascular Surgery with  Dr. Najjar.        [1]   Allergies  Allergen Reactions    Amoxicillin RASH    Ibuprofen SHORTNESS OF BREATH

## 2024-10-23 NOTE — TELEPHONE ENCOUNTER
Patient is calling in for order for compression stocking. Patient requesting the long stocking that go above the knee. Please advise

## 2024-10-23 NOTE — TELEPHONE ENCOUNTER
Call to patient with language line  ID # [id number]. Confirmed patient was seen by Vascular Surgery today and provided order for compression stockings along with vendors. Advised patient to contact her insurance to confirm what is covered under her plan. She verbalized understanding and states stockings are not as expensive as she expected, will purchase out of pocket if needed. No further questions at this time.

## 2024-10-28 ENCOUNTER — NURSE ONLY (OUTPATIENT)
Facility: CLINIC | Age: 39
End: 2024-10-28

## 2024-10-28 DIAGNOSIS — I83.813 VARICOSE VEINS OF BILATERAL LOWER EXTREMITIES WITH PAIN: ICD-10-CM

## 2024-11-14 ENCOUNTER — TELEPHONE (OUTPATIENT)
Dept: PHYSICAL THERAPY | Facility: HOSPITAL | Age: 39
End: 2024-11-14

## 2024-11-15 ENCOUNTER — OFFICE VISIT (OUTPATIENT)
Dept: PHYSICAL THERAPY | Age: 39
End: 2024-11-15
Attending: FAMILY MEDICINE
Payer: COMMERCIAL

## 2024-11-15 DIAGNOSIS — M72.2 PLANTAR FASCIITIS OF LEFT FOOT: Primary | ICD-10-CM

## 2024-11-15 PROCEDURE — 97530 THERAPEUTIC ACTIVITIES: CPT

## 2024-11-15 PROCEDURE — 97110 THERAPEUTIC EXERCISES: CPT

## 2024-11-15 PROCEDURE — 97161 PT EVAL LOW COMPLEX 20 MIN: CPT

## 2024-11-15 NOTE — PROGRESS NOTES
LOWER EXTREMITY EVALUATION:     Diagnosis:   Plantar Fascitis      Referring Provider: Barney Brown  Date of Evaluation:    11/15/2024    Precautions:  None Next MD visit:   none scheduled  Date of Surgery: n/a     PATIENT SUMMARY   Judd Montaño is a 39 year old female who presents to therapy today with complaints of foot pain for a year. She states the pain came out of nowhere. She states she is on her feet at work a lot.  Pt describes pain level current 5-6/10, at best 5/10, at worst 8/10.   Current functional limitations include wb act.     Judd Mario describes prior level of function was independent with her ADLs and IADLs. Pt goals include decreasing her pain symptoms.  Past medical history was reviewed with Judd Mario. No significant findings at this time.    ASSESSMENT  Judd Mario presents to physical therapy evaluation with primary c/o foot pain. The results of the objective tests and measures show decreased rom, weakness and tightness.  Functional deficits include but are not limited to wb act.  Signs and symptoms are consistent with diagnosis of plantar fascitis. Pt and PT discussed evaluation findings, pathology, POC and HEP.  Pt voiced understanding and performs HEP correctly without reported pain. Skilled Physical Therapy is medically necessary to address the above impairments and reach functional goals.     OBJECTIVE:   Observation: Bilateral supination and genu valgus. Patient had difficulty with tw, hw and squatting secondary to foot pain.  Palpation: Right heel pain  Sensation: Intact sigrid.    AROM: (* denotes performed with pain)  Foot/Ankle   DF: R 10; L 8  PF: R 70; L 69  INV: R 29; L 25  EV: R 5; L 3     Accessory motion: Ankle hypomobility in all directions; +2 grades.    Flexibility:  Hip Flexor: R Tight, L Tight  Hamstrings: R Tight; L Tight  Piriformis: R Tight; L Tight  Quads: R Tight; L Tight  Gastroc-soleus: R Tight; L Tight    Strength/MMT: (* denotes  performed with pain)  Foot/Ankle   DF: R 4+/5; L 4+/5  PF: R 4+/5; L 4+/5  INV: R 4+/5; L 4+/5  EV: R 4+/5; L 4+/5  Great toe ext: R 4+/5; L 4+/5     Special tests:   Negative for all special tests.    Gait: pt ambulates on level ground with antalgia and valentin. Supination.  Balance: SLS: R 30 sec, L 3 sec    Today’s Treatment and Response:   Pt education was provided on exam findings, treatment diagnosis, treatment plan, expectations, and prognosis. Pt was also provided recommendations for activity modifications, postural corrections, and ergonomics.  Patient was instructed in and issued a HEP for: Ther. Ex. For 10'- Valentin. Calf St. 4x30\"          Heel Raises 30xea.          Tennis Ball Self Mob. 3'          Ther. Act. For 10'- Discussed her condition, pt expectations and prognosis.    Charges: 1PT Eval Low Complexity, 1TE and 1TA      Total Timed Treatment: 20 min     Total Treatment Time: 45 min     Based on clinical rationale and outcome measures, this evaluation involved Low Complexity decision making.  PLAN OF CARE:    Goals: (to be met in 10 visits)  .Pt will demonstrate improved DF AROM to >= 10 degrees to promote proper foot clearance during gait and greater ease descending stairs without compensation  Pt will have increased ankle strength to 5/5 throughout for improved ankle control with ADLs such as prolonged gait and stair negotiation  Pt will have improved SLS to >30s for increased ankle stability with ambulation on uneven surfaces such as gravel and grass   Pt will report <1/10 pain with work and home activities such as walking   Pt will be independent and compliant with comprehensive HEP to maintain progress achieved in PT     Frequency / Duration: Patient will be seen for 2 x/week or a total of 10 visits over a 90 day period. Treatment will include: Gait training, Manual Therapy, Neuromuscular Re-education, Self-Care Home Management, Therapeutic Activities, Therapeutic Exercise, Home Exercise Program  instruction, and Modalities to include: Electrical stimulation (unattended)    Education or treatment limitation: None  Rehab Potential:fair    LEFS Score  No data recorded    Patient/Family/Caregiver was advised of these findings, precautions, and treatment options and has agreed to actively participate in planning and for this course of care.    Thank you for your referral. Please co-sign or sign and return this letter via fax as soon as possible to [phone]. If you have any questions, please contact me at Dept: [phone]    Sincerely,  Electronically signed by therapist: Claribel Agrawal, PT, DPT, CSCS  Physician's certification required: Yes  I certify the need for these services furnished under this plan of treatment and while under my care.    X___________________________________________________ Date____________________    Certification From: 11/15/2024  To:2/13/2025

## 2024-11-19 ENCOUNTER — OFFICE VISIT (OUTPATIENT)
Dept: PHYSICAL THERAPY | Age: 39
End: 2024-11-19
Attending: FAMILY MEDICINE
Payer: COMMERCIAL

## 2024-11-19 DIAGNOSIS — M72.2 PLANTAR FASCIITIS OF LEFT FOOT: Primary | ICD-10-CM

## 2024-11-19 PROCEDURE — 97112 NEUROMUSCULAR REEDUCATION: CPT

## 2024-11-19 PROCEDURE — 97110 THERAPEUTIC EXERCISES: CPT

## 2024-11-19 NOTE — PROGRESS NOTES
Diagnosis:   Plantar Fascitis      Referring Provider: Barney Brown  Date of Evaluation:    11/15/24    Precautions:  None Next MD visit:   none scheduled  Date of Surgery: n/a   Insurance Primary/Secondary: [employer] [place] / N/A     # Auth Visits: 10            Subjective: Patient reports having plantar pain this afternoon. She has been doing her HEP and felt ok after her initial eval.    Pain: 3/10      Objective:   Observation: Bilateral supination and genu valgus. Patient had difficulty with tw, hw and squatting secondary to foot pain.  Palpation: Right heel pain  Sensation: Intact sigrid.     AROM: (* denotes performed with pain)  Foot/Ankle   DF: R 10; L 8  PF: R 70; L 69  INV: R 29; L 25  EV: R 5; L 3      Accessory motion: Ankle hypomobility in all directions; +2 grades.     Flexibility:  Hip Flexor: R Tight, L Tight  Hamstrings: R Tight; L Tight  Piriformis: R Tight; L Tight  Quads: R Tight; L Tight  Gastroc-soleus: R Tight; L Tight     Strength/MMT: (* denotes performed with pain)  Foot/Ankle   DF: R 4+/5; L 4+/5  PF: R 4+/5; L 4+/5  INV: R 4+/5; L 4+/5  EV: R 4+/5; L 4+/5  Great toe ext: R 4+/5; L 4+/5      Special tests:   Negative for all special tests.     Gait: pt ambulates on level ground with antalgia and sigrid. Supination.  Balance: SLS: R 30 sec, L 3 sec      Assessment: Patient presents with bilateral le tightness; R>L during her self stretching and mob. She also presents with bilateral le weakness during her sitting and wb activities.      Goals: (to be met in 10 visits)  .Pt will demonstrate improved DF AROM to >= 10 degrees to promote proper foot clearance during gait and greater ease descending stairs without compensation  Pt will have increased ankle strength to 5/5 throughout for improved ankle control with ADLs such as prolonged gait and stair negotiation  Pt will have improved SLS to >30s for increased ankle stability with ambulation on uneven surfaces such as gravel and grass   Pt will report  <1/10 pain with work and home activities such as walking   Pt will be independent and compliant with comprehensive HEP to maintain progress achieved in PT     Plan: Plan to work on stretching, strengthening and posture re-ed.  Date: 11/19/2024  TX#: 2/10 Date:                 TX#: 3/ Date:                 TX#: 4/ Date:                 TX#: 5/ Date:   Tx#: 6/   Ther. Ex.: 35'  Bike 8' L3  Sitting Heel Raises 30x  Tennis Ball Self Mob. 3'  Standing Calf St. 3x30\"  Standing Inversion St. 4x30\"  Foam Roll Self Calves 3'       Neuro Re-ed.: 10'  Sitting Toe Taps 20xea.  1/4 Squats 2x20       HEP: Reviewed her original HEP.    Charges: 2TE and 1Neuro       Total Timed Treatment: 45 min  Total Treatment Time: 45 min

## 2024-11-21 ENCOUNTER — APPOINTMENT (OUTPATIENT)
Dept: PHYSICAL THERAPY | Age: 39
End: 2024-11-21
Attending: FAMILY MEDICINE
Payer: COMMERCIAL

## 2024-11-21 ENCOUNTER — TELEPHONE (OUTPATIENT)
Dept: PHYSICAL THERAPY | Facility: HOSPITAL | Age: 39
End: 2024-11-21

## 2024-11-25 ENCOUNTER — OFFICE VISIT (OUTPATIENT)
Dept: PHYSICAL THERAPY | Age: 39
End: 2024-11-25
Attending: FAMILY MEDICINE
Payer: COMMERCIAL

## 2024-11-25 DIAGNOSIS — M72.2 PLANTAR FASCIITIS OF LEFT FOOT: Primary | ICD-10-CM

## 2024-11-25 PROCEDURE — 97110 THERAPEUTIC EXERCISES: CPT

## 2024-11-25 PROCEDURE — 97112 NEUROMUSCULAR REEDUCATION: CPT

## 2024-11-25 NOTE — PROGRESS NOTES
Diagnosis:   Plantar Fascitis      Referring Provider: Barney Brown  Date of Evaluation:    11/15/24    Precautions:  None Next MD visit:   none scheduled  Date of Surgery: n/a   Insurance Primary/Secondary: [employer] / N/A     # Auth Visits: 10            Subjective: Patient reports having increased plantar pain this afternoon. She has been doing her HEP and felt ok after her last visit.    Pain: 5/10      Objective:   Observation: Bilateral supination and genu valgus. Patient had difficulty with tw, hw and squatting secondary to foot pain.  Palpation: Right heel pain  Sensation: Intact sigrid.     AROM: (* denotes performed with pain)  Foot/Ankle   DF: R 10; L 8  PF: R 70; L 69  INV: R 29; L 25  EV: R 5; L 3      Accessory motion: Ankle hypomobility in all directions; +2 grades.     Flexibility:  Hip Flexor: R Tight, L Tight  Hamstrings: R Tight; L Tight  Piriformis: R Tight; L Tight  Quads: R Tight; L Tight  Gastroc-soleus: R Tight; L Tight     Strength/MMT: (* denotes performed with pain)  Foot/Ankle   DF: R 4+/5; L 4+/5  PF: R 4+/5; L 4+/5  INV: R 4+/5; L 4+/5  EV: R 4+/5; L 4+/5  Great toe ext: R 4+/5; L 4+/5      Special tests:   Negative for all special tests.     Gait: pt ambulates on level ground with antalgia and sigrid. Supination.  Balance: SLS: R 30 sec, L 3 sec      Assessment: Patient presents with continued bilateral le tightness; R>L during her self stretching and mob. She also presents with continued bilateral le weakness during her sitting and wb activities. Her pain dropped from a 5/10 down to 1/10 after her treatment.      Goals: (to be met in 10 visits)  .Pt will demonstrate improved DF AROM to >= 10 degrees to promote proper foot clearance during gait and greater ease descending stairs without compensation  Pt will have increased ankle strength to 5/5 throughout for improved ankle control with ADLs such as prolonged gait and stair negotiation  Pt will have improved SLS to >30s for increased  ankle stability with ambulation on uneven surfaces such as gravel and grass   Pt will report <1/10 pain with work and home activities such as walking   Pt will be independent and compliant with comprehensive HEP to maintain progress achieved in PT     Plan: Plan to work on stretching, strengthening and posture re-ed.  Date: 11/19/2024  TX#: 2/10 Date:  11/25/24               TX#: 3/10 Date:                 TX#: 4/ Date:                 TX#: 5/ Date:   Tx#: 6/   Ther. Ex.: 35'  Bike 8' L3  Sitting Heel Raises 30x  Tennis Ball Self Mob. 3'  Standing Calf St. 3x30\"  Standing Inversion St. 4x30\"  Foam Roll Self Calves 3' Ther. Ex.: 35'  Bike 8' L3  Sitting Heel Raises 30x  Tennis Ball Self Mob. 3'  Standing Calf St. 3x30\"  Standing Inversion St. 4x30\"  Foam Roll Self Calves 3'      Neuro Re-ed.: 10'  Sitting Toe Taps 20xea.  1/4 Squats 2x20 Neuro Re-ed.: 10'  Sitting Toe Taps 20xea.  1/4 Squats 2x20  Gait Training      HEP: Did not add any new exercises to her HEP.    Charges: 2TE and 1Neuro       Total Timed Treatment: 45 min  Total Treatment Time: 45 min

## 2024-12-02 ENCOUNTER — APPOINTMENT (OUTPATIENT)
Dept: PHYSICAL THERAPY | Age: 39
End: 2024-12-02
Attending: FAMILY MEDICINE
Payer: COMMERCIAL

## 2024-12-05 ENCOUNTER — OFFICE VISIT (OUTPATIENT)
Dept: PHYSICAL THERAPY | Age: 39
End: 2024-12-05
Attending: FAMILY MEDICINE
Payer: COMMERCIAL

## 2024-12-05 ENCOUNTER — TELEPHONE (OUTPATIENT)
Dept: FAMILY MEDICINE CLINIC | Facility: CLINIC | Age: 39
End: 2024-12-05

## 2024-12-05 DIAGNOSIS — M72.2 PLANTAR FASCIITIS OF LEFT FOOT: Primary | ICD-10-CM

## 2024-12-05 DIAGNOSIS — I83.90 VARICOSE VEIN: Primary | ICD-10-CM

## 2024-12-05 PROCEDURE — 97110 THERAPEUTIC EXERCISES: CPT

## 2024-12-05 PROCEDURE — 97112 NEUROMUSCULAR REEDUCATION: CPT

## 2024-12-05 NOTE — TELEPHONE ENCOUNTER
Dr Brown =referral pended with the diagnosis code.        Patient called back and reported that she has a bilateral varicose vein issue. Requesting to expedite the referral, she has an appointment scheduled for tomorrow.      Future Appointments   Date Time Provider Department Center   12/6/2024 11:00 AM Sravanthi Sam MD [facility]       10/23/24 VASCULAR VISIT NOTE :  Varicose veins of bilateral lower extremities with pain  Dx Consult  Varicose Veins ; Referred by Barney Brown MD  Reason for Visi       Reason for Visit    Consult    Varicose Veins Patient is c/o itching, painful, small sores, cramping, burning on bilateral leg for four year worst for one year  No compressions use  No testing  No DM II  No vein procedures or surgeries  No blockages Hx

## 2024-12-05 NOTE — TELEPHONE ENCOUNTER
Patient came into [facility] to request a referral for the following doctor as she has an appt with him tomorrow at 11 AM.     Sravanthi Sam MD      If possible to send referral straight to their office patient kindly asks to do so. If not please call patient with more information or when she can pick-up thank you !

## 2024-12-05 NOTE — PROGRESS NOTES
Diagnosis:   Plantar Fascitis      Referring Provider: Barney Brown  Date of Evaluation:    11/15/24    Precautions:  None Next MD visit:   none scheduled  Date of Surgery: n/a   Insurance Primary/Secondary: [employer] / N/A     # Auth Visits: 10             Discharge Summary  Pt has attended 5 visits in Physical Therapy.      Subjective: Patient reports having decreased plantar pain this am. She has been doing her HEP and felt ok after her last visit.    Pain: 3-4/10      Objective:   Observation: Bilateral supination and genu valgus. Patient is able to toe walk, heel walk and squat pain-free.  Palpation: No pain  Sensation: Intact sigrid.     AROM: (* denotes performed with pain)  Foot/Ankle   DF: R 10; L 9  PF: R 70; L 69  INV: R 29; L 27  EV: R 5; L 4      Accessory motion: Ankle hypomobility in all directions; +2 grades.     Flexibility:  Hip Flexor: R Tight, L Tight  Hamstrings: R Tight; L Tight  Piriformis: R Tight; L Tight  Quads: R Tight; L Tight  Gastroc-soleus: R Tight; L Tight     Strength/MMT: (* denotes performed with pain)  Foot/Ankle   DF: R 4+/5; L 4+/5  PF: R 4+/5; L 4+/5  INV: R 4+/5; L 4+/5  EV: R 4+/5; L 4+/5  Great toe ext: R 4+/5; L 4+/5      Special tests:   Negative for all special tests.     Gait: pt ambulates on level ground with antalgia and sigrid. Supination.  Balance: SLS: R 30 sec, L 30 sec      Assessment: Patient presents with continued bilateral le tightness; R>L during her self stretching and mob. She also presents with continued bilateral le weakness during her wb activities. Her pain dropped from a 4/10 down to 0/10 after her treatment.      Goals: (to be met in 10 visits)  .Pt will demonstrate improved DF AROM to >= 10 degrees to promote proper foot clearance during gait and greater ease descending stairs without compensation PROGRESSING  Pt will have increased ankle strength to 5/5 throughout for improved ankle control with ADLs such as prolonged gait and stair negotiation  PROGRESSING  Pt will have improved SLS to >30s for increased ankle stability with ambulation on uneven surfaces such as gravel and grass MET  Pt will report <1/10 pain with work and home activities such as walking MET  Pt will be independent and compliant with comprehensive HEP to maintain progress achieved in PT MET    Plan: Plan to work on stretching, strengthening and posture re-ed.  Date: 11/19/2024  TX#: 2/10 Date:  11/25/24               TX#: 3/10 Date:   12/5/24              TX#: 4/10 Date:                 TX#: 5/ Date:   Tx#: 6/   Ther. Ex.: 35'  Bike 8' L3  Sitting Heel Raises 30x  Tennis Ball Self Mob. 3'  Standing Calf St. 3x30\"  Standing Inversion St. 4x30\"  Foam Roll Self Calves 3' Ther. Ex.: 35'  Bike 8' L3  Sitting Heel Raises 30x  Tennis Ball Self Mob. 3'  Standing Calf St. 3x30\"  Standing Inversion St. 4x30\"  Foam Roll Self Calves 3' Ther. Ex.: 35'  Bike 8' L3  Standing Heel Raises 30x  Tennis Ball Self Mob. 3'  Standing Calf St. 3x30\"  Standing Inversion St. 4x30\"  Foam Roll Self Calves 3'     Neuro Re-ed.: 10'  Sitting Toe Taps 20xea.  1/4 Squats 2x20 Neuro Re-ed.: 10'  Sitting Toe Taps 20xea.  1/4 Squats 2x20  Gait Training Neuro Re-ed.: 10'  Standing Toe Taps 20xea.  1/4 Squats 2x20  Gait Training     HEP: Reviewed her final HEP.    Charges: 2TE and 1Neuro       Total Timed Treatment: 45 min  Total Treatment Time: 45 min      Plan: Plan to discharge secondary to ind. With her HEP.    Patient/Family/Caregiver was advised of these findings, precautions, and treatment options and has agreed to actively participate in planning and for this course of care.    Thank you for your referral. If you have any questions, please contact me at Dept: [phone].    Sincerely,  Electronically signed by therapist: Claribel Agrawal, PT , DPT, CSCS    Physician's certification required:  No  Please co-sign or sign and return this letter via fax as soon as possible to [phone].   I certify the need for these  services furnished under this plan of treatment and while under my care.    X___________________________________________________ Date____________________    Certification From: 12/5/2024  To:3/5/2025

## 2024-12-06 ENCOUNTER — OFFICE VISIT (OUTPATIENT)
Facility: CLINIC | Age: 39
End: 2024-12-06

## 2024-12-06 ENCOUNTER — PATIENT OUTREACH (OUTPATIENT)
Dept: CASE MANAGEMENT | Age: 39
End: 2024-12-06

## 2024-12-06 VITALS — HEIGHT: 64 IN | BODY MASS INDEX: 32.95 KG/M2 | WEIGHT: 193 LBS

## 2024-12-06 DIAGNOSIS — I87.2 VENOUS INSUFFICIENCY: Primary | ICD-10-CM

## 2024-12-06 NOTE — PROCEDURES
Received order requesting to update Primary Care Physician (PCP) to Dr. Barney Brown is Approved and finalized on December 6, 2024.    Removed David Argueta DO as the patient's Primary Care Physician

## 2024-12-06 NOTE — PROGRESS NOTES
Sravanthi Sam MD.  Vascular and Endovascular Surgery          VASCULAR SURGERY VISIT NOTE       Judd Montaño   :  5/3/1985  MR#  [id number]    REFERRING PHYSICIAN:  Afsaneh Brown  PRIMARY CARE PHYSICIAN:  AFSANEH BROWN MD      HPI:    The patient is a 39 year old female who is here for follow-up for her bilateral lower extremity heaviness, tiredness and pain after prolonged standing worse on the right side. This continues to interfere with her activities of daily living and exercise.  She was seen previously and determined to benefit from a trial of conservative therapy for venous insufficiency diagnosed clinically and after a positive venous reflux study. Conservative measures including periodic leg elevation, walking for exercise, attempts at weight loss, avoiding prolonged standing, and oral analgesics including wearing compression Grade II compression stockings (20-30 mm Hg) for greater than 6 weeks. All of these afforts have all failed to control the severe symptoms. She reports that the stockings were not helpful as her symptoms are unchanged.   She is interested in a more permanent treatment. There has been no change in her medical or surgical history since the last visit.      MEDICATIONS:     Current Outpatient Medications   Medication Sig Dispense Refill    acetaminophen (TYLENOL EXTRA STRENGTH) 500 MG Oral Tab Take 2 tablets (1,000 mg total) by mouth every 6 (six) hours as needed for Pain. 60 tablet 0    naproxen 250 MG Oral Tab Take 1 tablet (250 mg total) by mouth 2 (two) times daily with meals. (Patient not taking: Reported on 2024) 60 tablet 1    pantoprazole 40 MG Oral Tab EC Take 1 tablet (40 mg total) by mouth every morning before breakfast. 30 tablet 3       ALLERGIES:   Allergies[1]    PHYSICAL EXAM:   Ht 5' 4\" (1.626 m)   Wt 193 lb (87.5 kg)   LMP 10/01/2024 (Within Days)   BMI 33.13 kg/m²     Scattered varicose veins along the right LE         IMPRESSION:   Bilateral lower extremity pain due to venous insufficiency.   Symptomatic calf varicosities.    PLAN:     We reviewed the options for management for venous insufficiency, including conservative therapy, medical-grade compression hose, sclerotherapy, stab phlebectomy, and endovenous laser ablation. The patient was again educated in the benign condition of venous disease.  Given that the patient has worn the compression stockings for the past several weeks without improvement, I have recommended endovenous therapy with laser or venaseal for her refluxing veins (B GSV) in addition to the possible need for stab phlebectomies. We discussed the risks of DVT/PE, bleeding, allergic reaction, nerve injury, infection, hyperpigmentation, chronic pain, recurrence and need for further interventions among other complications.   The patient understood and agreed to proceed with this treatment plan. All of her questions were answered during this clinic visit.   She wishes to proceed once approval is attained.    Thank you for allowing to participate in the care of your patients . Please do not hesitate to contact my office with any questions.      Sincerely,  Sravanthi Sam MD  [facility], Division of Vascular Surgery             [1]   Allergies  Allergen Reactions    Amoxicillin RASH    Ibuprofen SHORTNESS OF BREATH

## 2024-12-26 ENCOUNTER — TELEPHONE (OUTPATIENT)
Facility: CLINIC | Age: 39
End: 2024-12-26

## 2024-12-26 NOTE — TELEPHONE ENCOUNTER
Patient needs RFA of bilateral GSV vein procedures.  Called and spoke with patient. Scheduled vein procedure for 01/21/2025 and 02/04/2025. She was receptive. Let her know that she will need a post procedure ultrasound scan after procedure, she understood. MyChart not set up. Let her know we have paper copies of instructions. She stated she will come to the office in the next couple of weeks to pick it up.

## 2025-01-02 ENCOUNTER — OFFICE VISIT (OUTPATIENT)
Dept: FAMILY MEDICINE CLINIC | Facility: CLINIC | Age: 40
End: 2025-01-02

## 2025-01-02 VITALS
HEART RATE: 82 BPM | BODY MASS INDEX: 34 KG/M2 | WEIGHT: 198 LBS | SYSTOLIC BLOOD PRESSURE: 128 MMHG | DIASTOLIC BLOOD PRESSURE: 86 MMHG

## 2025-01-02 DIAGNOSIS — N63.20 MASS OF LEFT BREAST, UNSPECIFIED QUADRANT: Primary | ICD-10-CM

## 2025-01-02 DIAGNOSIS — N64.4 BREAST PAIN, LEFT: ICD-10-CM

## 2025-01-02 PROCEDURE — 3079F DIAST BP 80-89 MM HG: CPT | Performed by: FAMILY MEDICINE

## 2025-01-02 PROCEDURE — 99214 OFFICE O/P EST MOD 30 MIN: CPT | Performed by: FAMILY MEDICINE

## 2025-01-02 PROCEDURE — 3074F SYST BP LT 130 MM HG: CPT | Performed by: FAMILY MEDICINE

## 2025-01-02 NOTE — PROGRESS NOTES
Chief Complaint   Patient presents with    Breast Pain     C/o left breast pain and new mass     HPI:   Judd Montaño is a 39 year old female who presents to clinic with complaints of worsening left breast pain.  Patient has a history of numerous left breast cysts and reports enlargement of these masses over the last few Weeks with worsening pain.  No reported injury or trauma.    Cannot take NSAIDs due to allergy.    REVIEW OF SYSTEMS:   Negative, except per HPI.     EXAM:   /86 (BP Location: Right arm, Patient Position: Sitting, Cuff Size: large)   Pulse 82   Wt 198 lb (89.8 kg)   LMP 12/22/2024 (Within Days)   BMI 33.99 kg/m²   Body mass index is 33.99 kg/m².  GENERAL: well developed, well nourished, in no apparent distress  SKIN: no rashes, no suspicious lesions  HEENT: atraumatic, normocephalic  EYES: PERRLA, EOMI,conjunctiva are clear  NECK: supple, no adenopathy  BREAST: Left breast and left axilla with palpable masses throughout breast.  Grossly tender to palpation throughout whole breast nipple appears normal without discharge..     ASSESSMENT AND PLAN:     1. Mass of left breast, unspecified quadrant  - Surg Onc Breast Referral - In [facility]  - [facility] DIAGNOSTIC BILATERAL (CPT=77066); Future  - US BREAST LEFT LIMITED (CPT=76642); Future    2. Breast pain, left  - Surg Onc Breast Referral - In [facility]  - [facility] DIAGNOSTIC BILATERAL (CPT=77066); Future  - US BREAST LEFT LIMITED (CPT=76642); Future     RTC if no improvement in symptoms. Red flags discussed to go to ER.     Zeynep Spring MD  1/2/2025  2:43 PM

## 2025-01-07 NOTE — TELEPHONE ENCOUNTER
Scheduled for:  EGD 86585  Provider Name:  Dr. Isabelle Macdonald  Date:  03/09/2021  Location:  [facility]  Sedation:  MAC  Time:  8:30am, (pt is aware [facility] will call with arrival time)    Prep:  NPO after midnight   Meds/Allergies Reconciled?:  [facility]/NP reviewed.       D [Joint Pain] : joint pain [Negative] : Heme/Lymph

## 2025-01-21 ENCOUNTER — OFFICE VISIT (OUTPATIENT)
Facility: CLINIC | Age: 40
End: 2025-01-21

## 2025-01-21 DIAGNOSIS — I87.2 VENOUS INSUFFICIENCY: Primary | ICD-10-CM

## 2025-01-21 NOTE — PROCEDURES
Sravanthi Sam MD  Vascular Surgery  [facility]         Endovenous Radio Frequency Ablation (RFA) Therapy Operative Report    PATIENT:  Judd Montaño   : 5/3/1985     SURGEON: Sravanthi Sam MD     Pre-Op Diagnosis: Symptomatic Varicose Veins     Post-Op Diagnosis: Same    Date of Procedure: 2025     Procedure: Radio Frequency Endovascular ablation of the Right  Greater Saphenous Vein     The skin of the leg was prepped in the usual sterile fashion.  Ultrasound guidance was used throughout the procedure with a Gill iu22/[employer].  The gretaer saphenous vein was cannulated using a micropuncture system. A 7F sheath was placed advanced over the wire.  The RFA catheter fiber was passed through the sheath and placed to the appropriate position but at least 5 centimeters from the deep vein.  Using tumescent anesthesia the entire sheathed portion of the vein was anesthetized assuring that there was at least 1 cm between the vein and the skin surface.  The entire length of the vein was treated using 6 cycles of the RFA.  Duplex imaging confirmed closure of the accessory saphenous vein with no evidence for DVT.    Steri strips and sterile dressing were applied. The patient tolerated the procedure well.    Cannulation Site: Distal Calf      Treatment Length: 52 cm   Cycles: 9     Treatment Time: 2 min:21 sec      Surgeon Signature: Sravanthi Sam MD

## 2025-01-23 ENCOUNTER — HOSPITAL ENCOUNTER (OUTPATIENT)
Dept: MAMMOGRAPHY | Facility: HOSPITAL | Age: 40
Discharge: HOME | End: 2025-01-23
Attending: FAMILY MEDICINE
Payer: COMMERCIAL

## 2025-01-23 ENCOUNTER — HOSPITAL ENCOUNTER (OUTPATIENT)
Dept: ULTRASOUND IMAGING | Facility: HOSPITAL | Age: 40
Discharge: HOME | End: 2025-01-23
Attending: FAMILY MEDICINE
Payer: COMMERCIAL

## 2025-01-23 DIAGNOSIS — N64.4 BREAST PAIN, LEFT: ICD-10-CM

## 2025-01-23 DIAGNOSIS — N63.20 MASS OF LEFT BREAST, UNSPECIFIED QUADRANT: ICD-10-CM

## 2025-01-23 PROCEDURE — 76642 ULTRASOUND BREAST LIMITED: CPT | Performed by: FAMILY MEDICINE

## 2025-01-23 PROCEDURE — 77061 BREAST TOMOSYNTHESIS UNI: CPT | Performed by: FAMILY MEDICINE

## 2025-01-23 PROCEDURE — 77065 DX MAMMO INCL CAD UNI: CPT | Performed by: FAMILY MEDICINE

## 2025-01-24 ENCOUNTER — NURSE ONLY (OUTPATIENT)
Facility: CLINIC | Age: 40
End: 2025-01-24

## 2025-01-24 DIAGNOSIS — I83.813 VARICOSE VEINS OF BOTH LOWER EXTREMITIES WITH PAIN: ICD-10-CM

## 2025-01-24 PROCEDURE — 93971 EXTREMITY STUDY: CPT | Performed by: STUDENT IN AN ORGANIZED HEALTH CARE EDUCATION/TRAINING PROGRAM

## 2025-02-04 ENCOUNTER — OFFICE VISIT (OUTPATIENT)
Facility: CLINIC | Age: 40
End: 2025-02-04

## 2025-02-04 DIAGNOSIS — I83.813 VARICOSE VEINS OF BOTH LOWER EXTREMITIES WITH PAIN: Primary | ICD-10-CM

## 2025-02-04 NOTE — PATIENT INSTRUCTIONS
MEDICAL GRADE COMPRESSION VENDORS:  The list below are different vendors who have compression stockings available  Please call and make an appointment  Some of the following fitted clinics accept insurance or Self Pay:                 Patients with HMO insurance needs one primary physician referral      [place] [facility]                                          5.   [facility]                                       SELF PAY = $106-$170                                                                       SELF PAY (price varies)                        [facility]                                                                                 [address]                        [place], [place]         [facility]                                                                                             Phone # [phone] [address]                                                          Fax # [phone]                        [place], [place]                                                                                               Phone # [phone]                                                                                 Fax # [phone]              [place], [place]                                                  6.       [facility]   SELF PAY = $85-$150                                                                          SELF PAY (price varies)                        Some Insurance Plans over $200                                                 [address]                                                               Tel# [phone]                         Phone # [phone]                                                                     Fax # [phone]                         [address]                                                                                                [place], [place]                                                                                   [employer]                         Phone # [phone]                                                                     [place]                         Fax#[phone]                                                                              [employer]                                                                                                                                                                                                                                                                                     Medicare A-D    [facility]                                               7.  [facility]        SELF PAY = $32.99-$120                                           SELF PAY = [address]        No insurance plans accepted                                                        [place] [address]        [address]                                                                                  Phone # [phone]        [place], [place]                                                                                Fax # [phone]        Phone # [phone]; Fax # [phone]         Technician: [facility]        SELF PAY = $50-$156        No insurance plans accepted        No Custom compressions        2 pairs every 6 months (Lymphedema)                       [address] [place], [place]         Phone # [phone] Ext # 5; Fax # [phone]        Technician: Beth

## 2025-02-04 NOTE — PROCEDURES
Sravanthi Sam MD  Vascular Surgery  [facility]         Endovenous Radio Frequency Ablation (RFA) Therapy Operative Report    PATIENT:  Judd Montaño   : 5/3/1985     SURGEON: Sravanthi Sam MD     Pre-Op Diagnosis: Symptomatic Varicose Veins     Post-Op Diagnosis: Same    Date of Procedure: 2025     Procedure: Radio Frequency Endovascular ablation of the Left  Greater Saphenous Vein     The skin of the leg was prepped in the usual sterile fashion.  Ultrasound guidance was used throughout the procedure with a Gill iu22/[employer].  The gretaer saphenous vein was cannulated using a micropuncture system. A 7F sheath was placed advanced over the wire.  The RFA catheter fiber was passed through the sheath and placed to the appropriate position but at least 5 centimeters from the deep vein.  Using tumescent anesthesia the entire sheathed portion of the vein was anesthetized assuring that there was at least 1 cm between the vein and the skin surface.  The entire length of the vein was treated using 6 cycles of the RFA.  Duplex imaging confirmed closure of the accessory saphenous vein with no evidence for DVT.    Steri strips and sterile dressing were applied. The patient tolerated the procedure well.    Cannulation Site: Distal Calf      Treatment Length: 55 cm   Cycles: 9     Treatment Time: 2 min:36 sec      Surgeon Signature: Sravanthi Sam MD

## 2025-02-07 ENCOUNTER — NURSE ONLY (OUTPATIENT)
Facility: CLINIC | Age: 40
End: 2025-02-07

## 2025-02-07 DIAGNOSIS — I83.813 VARICOSE VEINS OF BOTH LOWER EXTREMITIES WITH PAIN: ICD-10-CM

## 2025-02-07 PROCEDURE — 93971 EXTREMITY STUDY: CPT | Performed by: STUDENT IN AN ORGANIZED HEALTH CARE EDUCATION/TRAINING PROGRAM

## 2025-03-12 ENCOUNTER — OFFICE VISIT (OUTPATIENT)
Facility: CLINIC | Age: 40
End: 2025-03-12
Payer: COMMERCIAL

## 2025-03-12 VITALS — WEIGHT: 199.19 LBS | HEIGHT: 64 IN | BODY MASS INDEX: 34.01 KG/M2

## 2025-03-12 DIAGNOSIS — N64.4 BREAST PAIN, LEFT: Primary | ICD-10-CM

## 2025-03-12 DIAGNOSIS — I83.813 VARICOSE VEINS OF BILATERAL LOWER EXTREMITIES WITH PAIN: Primary | ICD-10-CM

## 2025-03-12 DIAGNOSIS — N60.02 BENIGN CYST OF LEFT BREAST: ICD-10-CM

## 2025-03-12 DIAGNOSIS — Z80.3 FAMILY HISTORY OF BREAST CANCER: ICD-10-CM

## 2025-03-12 DIAGNOSIS — N60.02 BREAST CYST, LEFT: ICD-10-CM

## 2025-03-12 PROCEDURE — 99213 OFFICE O/P EST LOW 20 MIN: CPT | Performed by: SURGERY

## 2025-03-12 PROCEDURE — 99024 POSTOP FOLLOW-UP VISIT: CPT

## 2025-03-12 PROCEDURE — 3008F BODY MASS INDEX DOCD: CPT

## 2025-03-12 RX ORDER — MULTIVIT WITH MINERALS/LUTEIN
400 TABLET ORAL DAILY
Qty: 90 CAPSULE | Refills: 3 | Status: SHIPPED | OUTPATIENT
Start: 2025-03-12

## 2025-03-12 NOTE — PROGRESS NOTES
VASCULAR SURGERY OFFICE NOTE    3/12/2025    Name: Judd Montaño   : 5/3/1985  [id number]     REASON FOR VISIT:   Patient is a 39 year old female who is here for her follow up visit for RFA of the  bilateral GSV (Right GSV 25, Left GSV 25). Has done well at home.  She reports improving pain. Denies any new erythema at the access sites. The postop ultrasounds revealed no deep venous thrombosis with occlusion of the treated veins.  The patient has been wearing her compression stockings as instructed.    PAST MEDICAL HISTORY:     Past Medical History:    Influenza    IUD threads lost    Numbness of left foot    Palpitations    Unintentional weight loss of more than 5% body weight within 1 month    Reported 30lb weight loss in the last 1mo or so.  Only associated symptom is hair loss and some intermittent LUQ abdominal pain that radiates to the L back. No other associated symptoms      Pt uptodate on cervcial cancer screening.   Overdue for mammogram -ordered   Pt with significant family hx of breast cancer in sister x 2 in their 40s and paternal cousin dx with metastatic colon cancer at the       PAST SURGICAL HISTORY:     Past Surgical History:   Procedure Laterality Date      2005    Cyst aspiration left  2016    Electrocardiogram, complete  2012    scanned to media        MEDICATIONS:     Current Outpatient Medications   Medication Sig Dispense Refill    vitamin E 400 UNITS Oral Cap Take 1 capsule (400 Units total) by mouth daily. 90 capsule 3    Etonogestrel (NEXPLANON SC) Inject into the skin.      naproxen 250 MG Oral Tab Take 1 tablet (250 mg total) by mouth 2 (two) times daily with meals. (Patient not taking: Reported on 2025) 60 tablet 1    pantoprazole 40 MG Oral Tab EC Take 1 tablet (40 mg total) by mouth every morning before breakfast. 30 tablet 3    acetaminophen (TYLENOL EXTRA STRENGTH) 500 MG Oral Tab Take 2 tablets (1,000 mg total) by mouth every 6  (six) hours as needed for Pain. 60 tablet 0       EXAM:   LMP 01/16/2025 (Within Days)   The  bilateral calf access sites have healed well with no erythema.       ASSESSMENT AND PLAN:   Successful treatment of the venous insufficiency with no complications.   The patient is very satisfied and will see me on an as needed basis.    KUNAL Roblero  Division of Vascular Surgery

## 2025-03-16 ENCOUNTER — HOSPITAL ENCOUNTER (OUTPATIENT)
Dept: GENERAL RADIOLOGY | Age: 40
Discharge: HOME | End: 2025-03-16
Attending: NURSE PRACTITIONER
Payer: COMMERCIAL

## 2025-03-16 ENCOUNTER — OFFICE VISIT (OUTPATIENT)
Dept: FAMILY MEDICINE CLINIC | Facility: CLINIC | Age: 40
End: 2025-03-16

## 2025-03-16 VITALS
BODY MASS INDEX: 34 KG/M2 | RESPIRATION RATE: 14 BRPM | HEART RATE: 69 BPM | WEIGHT: 200.38 LBS | SYSTOLIC BLOOD PRESSURE: 132 MMHG | DIASTOLIC BLOOD PRESSURE: 79 MMHG

## 2025-03-16 DIAGNOSIS — M79.671 PAIN OF RIGHT HEEL: Primary | ICD-10-CM

## 2025-03-16 DIAGNOSIS — M79.671 PAIN OF RIGHT HEEL: ICD-10-CM

## 2025-03-16 PROCEDURE — 99214 OFFICE O/P EST MOD 30 MIN: CPT | Performed by: NURSE PRACTITIONER

## 2025-03-16 PROCEDURE — 3075F SYST BP GE 130 - 139MM HG: CPT | Performed by: NURSE PRACTITIONER

## 2025-03-16 PROCEDURE — 3078F DIAST BP <80 MM HG: CPT | Performed by: NURSE PRACTITIONER

## 2025-03-16 NOTE — PROGRESS NOTES
HPI    Patient presents for concerns of right heel pain that has been present for a significant amount of time.  Is worsening as of recently.  Takes Tylenol as needed with minimal relief of pain.    Review of Systems   Musculoskeletal:         Right heel pain.     All other systems reviewed and are negative.       Vitals:    25 1142   BP: 132/79   Pulse: 69   Resp: 14   Weight: 200 lb 6.4 oz (90.9 kg)     Body mass index is 34.4 kg/m².    Health Maintenance   Topic Date Due    Annual Depression Screening  2025    Annual Physical  2025    DTaP,Tdap,and Td Vaccines (2 - Td or Tdap) 2026    Pap Smear  2027    Influenza Vaccine  Completed    COVID-19 Vaccine  Completed    Pneumococcal Vaccine: Birth to 50yrs  Aged Out    Meningococcal B Vaccine  Aged Out       Past Medical History:    Influenza    IUD threads lost    Numbness of left foot    Palpitations    Unintentional weight loss of more than 5% body weight within 1 month    Reported 30lb weight loss in the last 1mo or so.  Only associated symptom is hair loss and some intermittent LUQ abdominal pain that radiates to the L back. No other associated symptoms      Pt uptodate on cervcial cancer screening.   Overdue for mammogram -ordered   Pt with significant family hx of breast cancer in sister x 2 in their 40s and paternal cousin dx with metastatic colon cancer at the       .  Past Surgical History:   Procedure Laterality Date          Cyst aspiration left  2016    Electrocardiogram, complete  2012    scanned to media       Family History   Problem Relation Age of Onset    Cancer Mother 42        brain cancer    Prostate Cancer Father 65    Cancer Father 65        prostate ca    Migraines Sister     Breast Cancer Sister 33    Cancer Maternal Grandmother         brain cancer    Prostate Cancer Maternal Uncle     Prostate Cancer Maternal Uncle     Breast Cancer Half-Sister 44    Breast Cancer Paternal Cousin Female          age 40s    Breast Cancer Paternal Cousin Female         age 40s    Colon Cancer Paternal Cousin Male 36       Social History     Socioeconomic History    Marital status:      Spouse name: Not on file    Number of children: Not on file    Years of education: Not on file    Highest education level: Not on file   Occupational History    Not on file   Tobacco Use    Smoking status: Never     Passive exposure: Never    Smokeless tobacco: Never   Vaping Use    Vaping status: Never Used   Substance and Sexual Activity    Alcohol use: Yes     Comment: rarely    Drug use: No    Sexual activity: Yes     Partners: Male     Birth control/protection: Implant     Comment: nexplanon placed 12/8   Other Topics Concern     Service Not Asked    Blood Transfusions Not Asked    Caffeine Concern No    Occupational Exposure Not Asked    Hobby Hazards Not Asked    Sleep Concern Not Asked    Stress Concern Not Asked    Weight Concern Not Asked    Special Diet Not Asked    Back Care Not Asked    Exercise Not Asked    Bike Helmet Not Asked    Seat Belt Not Asked    Self-Exams Not Asked   Social History Narrative    Not on file     Social Drivers of Health     Food Insecurity: Not on file   Transportation Needs: Not on file   Stress: Not on file   Housing Stability: Not on file       Current Outpatient Medications   Medication Sig Dispense Refill    vitamin E 400 UNITS Oral Cap Take 1 capsule (400 Units total) by mouth daily. 90 capsule 3    Etonogestrel (NEXPLANON SC) Inject into the skin.      acetaminophen (TYLENOL EXTRA STRENGTH) 500 MG Oral Tab Take 2 tablets (1,000 mg total) by mouth every 6 (six) hours as needed for Pain. 60 tablet 0       Allergies:  Allergies[1]    Physical Exam  Vitals and nursing note reviewed.   Constitutional:       General: She is not in acute distress.     Appearance: Normal appearance.   Pulmonary:      Effort: No respiratory distress.   Musculoskeletal:      Comments: Right heel  tenderness.    Neurological:      Mental Status: She is alert and oriented to person, place, and time.          Assessment and Plan:   Problem List Items Addressed This Visit    None  Visit Diagnoses       Pain of right heel    -  Primary    Relevant Orders    XR HEEL (CALCANEUS) (MIN 2 VIEWS), RIGHT (CPT=73650)    Podiatry Referral - In Network           Heel x-ray today.  Referral to podiatry for assessment and treatment.    Discussed plan of care with patient and patient is in agreement.  All questions answered. Patient to call with questions or concerns.    Encouraged to sign up for My Chart if not already registered.        [1]   Allergies  Allergen Reactions    Amoxicillin RASH    Ibuprofen SHORTNESS OF BREATH

## 2025-03-17 ENCOUNTER — HOSPITAL ENCOUNTER (OUTPATIENT)
Dept: GENERAL RADIOLOGY | Age: 40
Discharge: HOME | End: 2025-03-17
Attending: NURSE PRACTITIONER
Payer: COMMERCIAL

## 2025-03-17 PROCEDURE — 73650 X-RAY EXAM OF HEEL: CPT | Performed by: NURSE PRACTITIONER

## 2025-03-20 ENCOUNTER — TELEPHONE (OUTPATIENT)
Dept: FAMILY MEDICINE CLINIC | Facility: CLINIC | Age: 40
End: 2025-03-20

## 2025-03-20 NOTE — TELEPHONE ENCOUNTER
[place], [place] [id number], Sami  Patient called (identified name and ),   Patient calling for xray results (heel).  Advised not back yet, check tomorrow.  She stated understanding.

## 2025-03-24 PROBLEM — N64.4 BREAST PAIN, LEFT: Status: ACTIVE | Noted: 2025-03-24

## 2025-03-24 NOTE — PROGRESS NOTES
Breast Surgery Surveillance    History of Present Illness:   Ms. Judd Montaño is a 39 year old woman who presents with self detected concern of palpable masses and pain to her breast that are associated with discomfort.  She says that for many years she has known she has cysts in her breast and has had prior left breast cyst aspiration in 2016.  She does have a family history of breast cancer in 2 sisters at the ages of 38 and 45.  The patient herself underwent genetic testing that was negative in June 2023.  She does have palpable cystic lesions in the left breast that she reports do not bother her persistently.  She denies any nipple discharge, skin changes or other concerns.  She had a bilateral diagnostic evaluation in July 2024 that showed no suspicious findings with heterogeneously dense breast tissue.  Given her family history she had an MRI in August 2024 and was noted to have innumerable cysts bilaterally with a presumed benign mass in the right upper outer quadrant thought to be a lymph node for which consideration of second look ultrasound and short-term surveillance was recommended.  The right breast ultrasound on August 23, 2024 confirmed a normal-appearing intramammary lymph node at 10:00, 8 cm from the nipple.  Given recurrent pain in the left breast she had a left diagnostic evaluation on January 23, 2025 and was noted to have multiple cystic lesions in her area of concern with no suspicious findings. She is here today for evaluation and recommendations for further therapy.        Past Medical History:    Influenza    IUD threads lost    Numbness of left foot    Palpitations    Unintentional weight loss of more than 5% body weight within 1 month    Reported 30lb weight loss in the last 1mo or so.  Only associated symptom is hair loss and some intermittent LUQ abdominal pain that radiates to the L back. No other associated symptoms      Pt uptodate on cervcial cancer screening.    Overdue for mammogram -ordered   Pt with significant family hx of breast cancer in sister x 2 in their 40s and paternal cousin dx with metastatic colon cancer at the       Past Surgical History:   Procedure Laterality Date      2005    Cyst aspiration left  2016    Electrocardiogram, complete  2012    scanned to media       Gynecological History:  Pt is a   Pt was 19 years old at time of first pregnancy.    She has cumulative breastfeeding history of 1 week.  She achieved menarche at age 14 and LMP 2023  She denies any history of hormone replacement therapy  She denies any history of oral contraceptive use   She denies infertility treatment to achieve pregnancy.    Medications:     vitamin E 400 UNITS Oral Cap Take 1 capsule (400 Units total) by mouth daily. 90 capsule 3       Allergies:    Allergies   Allergen Reactions    Amoxicillin RASH    Ibuprofen SHORTNESS OF BREATH       Family History:   Family History   Problem Relation Age of Onset    Cancer Mother 42        brain cancer    Prostate Cancer Father 65    Cancer Father 65        prostate ca    Migraines Sister     Breast Cancer Sister 33    Cancer Maternal Grandmother         brain cancer    Prostate Cancer Maternal Uncle     Prostate Cancer Maternal Uncle     Breast Cancer Half-Sister 44    Breast Cancer Paternal Cousin Female         age 40s    Breast Cancer Paternal Cousin Female         age 40s    Colon Cancer Paternal Cousin Male 36       She is not of Ashkenazi Alevism ancestry.    Social History:  History   Alcohol Use    Yes     Comment: rarely       History   Smoking Status    Never   Smokeless Tobacco    Never       Ms. Judd Montaño is  with 2 children. She has 17 siblings. She is currently Employed full-time    Review of Systems:  General:   The patient denies, fever, chills, night sweats, fatigue, generalized weakness, change in appetite or weight loss.    HEENT:     The patient denies eye  irritation, cataracts, redness, glaucoma, yellowing of the eyes, change in vision, color blindness, or wearing contacts/glasses. The patient denies hearing loss, ringing in the ears, ear drainage, earaches, nasal congestion, nose bleeds, snoring, pain in mouth/throat, hoarseness, change in voice, facial trauma.    Respiratory:  The patient denies chronic cough, phlegm, hemoptysis, pleurisy/chest pain, pneumonia, asthma, wheezing, difficulty in breathing with exertion, emphysema, chronic bronchitis, shortness of breath or abnormal sound when breathing.     Cardiovascular:  There is no history of chest pain, chest pressure/discomfort, palpitations, irregular heartbeat, fainting or near-fainting, difficulty breathing when lying flat, SOB/Coughing at night, swelling of the legs or chest pain while walking.    Breasts:  See history of present illness    Gastrointestinal:     There is no history of difficulty or pain with swallowing, reflux symptoms, vomiting, dark or bloody stools, constipation, yellowing of the skin, indigestion, nausea, change in bowel habits, diarrhea, abdominal pain or vomiting blood.     Genitourinary:  The patient denies frequent urination, needing to get up at night to urinate, urinary hesitancy or retaining urine, painful urination, urinary incontinence, decreased urine stream, blood in the urine or vaginal/penile discharge.    Skin:    The patient denies rash, itching, skin lesions, dry skin, change in skin color or change in moles.     Hematologic/Lymphatic:  The patient denies easily bruising or bleeding or persistent swollen glands or lymph nodes.     Musculoskeletal:  The patient denies muscle aches/pain, joint pain, stiff joints, neck pain, back pain or bone pain.    Neuropsychiatric:  There is no history of migraines or severe headaches, seizure/epilepsy, speech problems, coordination problems, trembling/tremors, fainting/black outs, dizziness, memory problems, loss of sensation/numbness,  problems walking, weakness, tingling or burning in hands/feet. There is no history of abusive relationship, bipolar disorder, sleep disturbance, anxiety, depression or feeling of despair.    Endocrine:    There is no history of poor/slow wound healing, weight loss/gain, fertility or hormone problems, cold intolerance, thyroid disease.     Allergic/Immunologic:  There is no history of hives, hay fever, angioedema or anaphylaxis.    LMP 01/16/2025 (Within Days)     Physical Exam:  The patient is an alert, oriented, well-nourished and  well-developed woman who appears her stated age. Her speech patterns and movements are normal. Her affect is appropriate.    HEENT: The head is normocephalic. The neck is supple. The thyroid is not enlarged and is without palpable masses/nodules. There are no palpable masses. The trachea is in the midline. Conjunctiva are clear, non-icteric.    Chest: The chest expands symmetrically. The lungs are clear to auscultation.    Heart: The rhythm is regular.  There are no murmurs, rubs, gallops or thrills.    Breasts:  Her breasts are symmetrical with a cup size 36C.  Right breast: The skin, nipple ,and areola appear normal. There is no skin dimpling with movement of the pectoralis. There is no nipple retraction. No nipple discharge can be elicited. The parenchyma is mildly nodular. There are no dominant masses in the breast. The axillary tail is normal.  Left breast:   The skin, nipple, and areola appear normal. There is no skin dimpling with movement of the pectoralis. There is no nipple retraction. No nipple discharge can be elicited. The parenchyma is mildly nodular. There were multiple palpable smooth and mobile cystic lesions including a 3 x 3 cm lesion at the 12 o'clock position, 2 cm from the nipple.  She has palpable smooth but mobile left axillary lymph nodes that are tender to palpation.    Abdomen:  The abdomen is soft, flat and non tender. The liver is not enlarged. There are no  palpable masses.    Lymph Nodes:  The supraclavicular, axillary and cervical regions are free of significant lymphadenopathy.    Back: There is no vertebral column tenderness.    Skin: The skin appears normal. There are no suspicious appearing rashes or lesions.    Extremities: The extremities are without deformity, cyanosis or edema.    Impression:   Ms. Judd Montaño is a 39 year old woman presents with family history of breast cancer, dense breast tissue, bilateral breast cysts.    Discussion and Plan:  I had a discussion with the Patient regarding her breast exam. On exam today I found palpable cystic lesions in the left breast was reproducible tenderness to palpation.  I personally reviewed the recent imaging we discussed this at length.    I explained that simple cysts are a common finding in pre-menopausal women and that no specific intervention is universally warranted. I did explain that if a cyst enlarges or becomes focally symptomatic, occasionally interventions such as aspiration may be performed for transient relief of symptoms. I discussed that the relief is transient as the wall of the cyst maintains its integrity and therefore fluid tends to re-accumulate within the cyst within the next cycle. Excision of cysts is not recommended as the risk of the procedure is generally greater than the benefit of excising the cyst. In addition, simple cysts are not known to be precursor lesions and therefore there is no increased risk for the development of breast cancer in the future.     She has previously undergone genetic testing that was unremarkable.  She will be followed per high risk protocol in light of her elevated risk.  We will plan for her surveillance MRI annually which would next be due in August 2025 and her next bilateral mammogram is due in July 2025.    With regard to her significant cyclical mastalgia, we discussed that this is a common symptomatology, often exacerbated by  vernon-menopausal fluctuation in hormonal levels. While there is no good reliable treatment, it has been identified that avoiding caffeine, wearing a well-supportive bra and chest wall strengthening exercises may all help with alleviating symptoms. We also discussed that oral Vit E 400-600 IU daily has also been found to alleviate cyclical mastalgia in some patients secondary to its anti-inflammatory properties without significant side effects. For this reason, I have recommended that she initiate this to combat her symptoms.     She should be seen annually in the high-risk clinic for recommendations regarding ongoing surveillance.  She was given ample opportunity for questions and those questions were answered to her satisfaction. She has been  encouraged to contact the office with any questions or concerns prior to her next appointment.     This encounter lasted a total of 25 minutes, more than 50% of which was dedicated to the discussion of management options.

## 2025-03-28 ENCOUNTER — OFFICE VISIT (OUTPATIENT)
Dept: PODIATRY CLINIC | Facility: CLINIC | Age: 40
End: 2025-03-28

## 2025-03-28 VITALS
WEIGHT: 200 LBS | HEIGHT: 64 IN | SYSTOLIC BLOOD PRESSURE: 131 MMHG | DIASTOLIC BLOOD PRESSURE: 82 MMHG | BODY MASS INDEX: 34.15 KG/M2 | HEART RATE: 78 BPM

## 2025-03-28 DIAGNOSIS — M72.2 PLANTAR FASCIITIS: ICD-10-CM

## 2025-03-28 DIAGNOSIS — M79.671 RIGHT FOOT PAIN: Primary | ICD-10-CM

## 2025-03-28 DIAGNOSIS — M79.671 PAIN OF RIGHT HEEL: ICD-10-CM

## 2025-03-28 PROCEDURE — 3075F SYST BP GE 130 - 139MM HG: CPT | Performed by: STUDENT IN AN ORGANIZED HEALTH CARE EDUCATION/TRAINING PROGRAM

## 2025-03-28 PROCEDURE — 99204 OFFICE O/P NEW MOD 45 MIN: CPT | Performed by: STUDENT IN AN ORGANIZED HEALTH CARE EDUCATION/TRAINING PROGRAM

## 2025-03-28 PROCEDURE — 3079F DIAST BP 80-89 MM HG: CPT | Performed by: STUDENT IN AN ORGANIZED HEALTH CARE EDUCATION/TRAINING PROGRAM

## 2025-03-28 PROCEDURE — 20550 NJX 1 TENDON SHEATH/LIGAMENT: CPT | Performed by: STUDENT IN AN ORGANIZED HEALTH CARE EDUCATION/TRAINING PROGRAM

## 2025-03-28 PROCEDURE — 3008F BODY MASS INDEX DOCD: CPT | Performed by: STUDENT IN AN ORGANIZED HEALTH CARE EDUCATION/TRAINING PROGRAM

## 2025-03-28 RX ORDER — TRIAMCINOLONE ACETONIDE 40 MG/ML
40 INJECTION, SUSPENSION INTRA-ARTICULAR; INTRAMUSCULAR ONCE
Status: COMPLETED | OUTPATIENT
Start: 2025-03-28 | End: 2025-03-28

## 2025-03-28 RX ORDER — DEXAMETHASONE SODIUM PHOSPHATE 4 MG/ML
4 VIAL (ML) INJECTION ONCE
Status: COMPLETED | OUTPATIENT
Start: 2025-03-28 | End: 2025-03-28

## 2025-03-28 RX ADMIN — TRIAMCINOLONE ACETONIDE 40 MG: 40 INJECTION, SUSPENSION INTRA-ARTICULAR; INTRAMUSCULAR at 13:54:00

## 2025-03-28 RX ADMIN — DEXAMETHASONE SODIUM PHOSPHATE 4 MG: 4 MG/ML VIAL (ML) INJECTION at 13:52:00

## 2025-03-28 NOTE — PROGRESS NOTES
[facility]  Progress Note      Judd Montaño is a 39 year old female.   Chief Complaint   Patient presents with    Foot Pain     Consult - right heel pain - onset about 1 year - was seen by PCP - was in PT finished about 5 months ago - pt states that she didn't see any improvement with PT - pain rated 6/10 constant - some swelling -has numbness and tingling - xray in system              HPI:     Patient is a pleasant 39-year-old Malagasy-speaking female presents to clinic for evaluation of chronic right heel pain.  She denies any injuries or trauma.  Admits to taking oral NSAIDs and a Medrol Dosepak as well as completing physical therapy with no improvement.  She admits that she has to spend prolonged hours standing and walking for her job.  She relates a 6 out of 10 pain constantly.  She has x-rays in the system which did not show any fractures or dislocations.   is on the line.    Allergies: Amoxicillin and Ibuprofen    Current Outpatient Medications   Medication Sig Dispense Refill    vitamin E 400 UNITS Oral Cap Take 1 capsule (400 Units total) by mouth daily. 90 capsule 3    Etonogestrel (NEXPLANON SC) Inject into the skin.      acetaminophen (TYLENOL EXTRA STRENGTH) 500 MG Oral Tab Take 2 tablets (1,000 mg total) by mouth every 6 (six) hours as needed for Pain. 60 tablet 0      Past Medical History:    Influenza    IUD threads lost    Numbness of left foot    Palpitations    Unintentional weight loss of more than 5% body weight within 1 month    Reported 30lb weight loss in the last 1mo or so.  Only associated symptom is hair loss and some intermittent LUQ abdominal pain that radiates to the L back. No other associated symptoms      Pt uptodate on cervcial cancer screening.   Overdue for mammogram -ordered   Pt with significant family hx of breast cancer in sister x 2 in their 40s and paternal cousin dx with metastatic colon cancer at the      Past Surgical  History:   Procedure Laterality Date          Cyst aspiration left  2016    Electrocardiogram, complete  2012    scanned to media      Family History   Problem Relation Age of Onset    Cancer Mother 42        brain cancer    Prostate Cancer Father 65    Cancer Father 65        prostate ca    Migraines Sister     Breast Cancer Sister 33    Cancer Maternal Grandmother         brain cancer    Prostate Cancer Maternal Uncle     Prostate Cancer Maternal Uncle     Breast Cancer Half-Sister 44    Breast Cancer Paternal Cousin Female         age 40s    Breast Cancer Paternal Cousin Female         age 40s    Colon Cancer Paternal Cousin Male 36      Social History     Socioeconomic History    Marital status:    Tobacco Use    Smoking status: Never     Passive exposure: Never    Smokeless tobacco: Never   Vaping Use    Vaping status: Never Used   Substance and Sexual Activity    Alcohol use: Yes     Comment: rarely    Drug use: No    Sexual activity: Yes     Partners: Male     Birth control/protection: Implant     Comment: nexplanon placed    Other Topics Concern    Caffeine Concern No           REVIEW OF SYSTEMS:     Denies nause, fever, chills  No calf pain  Denies chest pain or SOB      EXAM:   /82 (BP Location: Right arm, Patient Position: Sitting, Cuff Size: adult)   Pulse 78   Ht 5' 4\" (1.626 m)   Wt 200 lb (90.7 kg)   LMP 2025 (Within Days)   BMI 34.33 kg/m²   GENERAL: well developed, well nourished, in no apparent distress  EXTREMITIES:   1. Integument: Normal skin temperature and turgor  2. Vascular: Dorsalis pedis two out of four bilateral and posterior tibial pulses two out of   four bilateral, capillary refill normal.   3. Musculoskeletal: All muscle groups are graded 5 out of 5 in the foot and ankle.  Pain with palpation to right medial calcaneal tubercle   4. Neurological: Normal sharp dull sensation; reflexes normal.             ASSESSMENT AND PLAN:    Diagnoses and all orders for this visit:    Right foot pain    Plantar fasciitis  -     dexamethasone (Decadron) 4 MG/ML injection 4 mg  -     triamcinolone acetonide (Kenalog-40) 40 MG/ML injection 40 mg    Pain of right heel [M79.671]        Plan:     -Patient examined, chart history reviewed.  -Discussed etiology of patient's condition and various treatment options.  -Discussed importance of proper shoe gear and orthotics.  Patient to avoid walking barefoot at all times.  -Discussed importance of stretching exercises.  Patient to aim to stretch 3-5 times daily.  -Discussed steroid injection with patient including benefits and risks.  Patient agrees to injection and written consent was obtained.  -After prepping site with alcohol, administered steroid injection to right plantar heel consisting of 1 cc of 1% lidocaine plain, 1 cc of dexamethasone, and  1 cc of Kenalog 40.  Patient tolerated the injection well and there were no complications.  Site was dressed with Band-Aid.  -Wear supportive shoe gear and inserts at all times with ambulation.  Avoid walking barefoot.  - Perform stretching exercises 3-5 times daily.  Instructions dispensed.  - Monitor response to steroid injection.  - Follow-up in 3 months for reevaluation.      The patient indicates understanding of these issues and agrees to the plan.        Marlen Leigh DPM

## 2025-03-28 NOTE — PROGRESS NOTES
Per Dr Leigh request was draw 1 syringe with 1 ml Lidocaine 1%, 1 ml Kenalog 40 mg and 1 ml Dexamethasone 20mg/5ml for this patient

## 2025-04-03 ENCOUNTER — OFFICE VISIT (OUTPATIENT)
Dept: FAMILY MEDICINE CLINIC | Facility: CLINIC | Age: 40
End: 2025-04-03

## 2025-04-03 VITALS
SYSTOLIC BLOOD PRESSURE: 122 MMHG | WEIGHT: 197 LBS | DIASTOLIC BLOOD PRESSURE: 77 MMHG | BODY MASS INDEX: 33.63 KG/M2 | TEMPERATURE: 97 F | HEIGHT: 64 IN | HEART RATE: 68 BPM

## 2025-04-03 DIAGNOSIS — E66.811 CLASS 1 OBESITY DUE TO EXCESS CALORIES WITHOUT SERIOUS COMORBIDITY WITH BODY MASS INDEX (BMI) OF 33.0 TO 33.9 IN ADULT: ICD-10-CM

## 2025-04-03 DIAGNOSIS — E66.09 CLASS 1 OBESITY DUE TO EXCESS CALORIES WITHOUT SERIOUS COMORBIDITY WITH BODY MASS INDEX (BMI) OF 33.0 TO 33.9 IN ADULT: ICD-10-CM

## 2025-04-03 DIAGNOSIS — R53.83 FATIGUE, UNSPECIFIED TYPE: Primary | ICD-10-CM

## 2025-04-03 PROCEDURE — 96372 THER/PROPH/DIAG INJ SC/IM: CPT | Performed by: FAMILY MEDICINE

## 2025-04-03 PROCEDURE — 3008F BODY MASS INDEX DOCD: CPT | Performed by: FAMILY MEDICINE

## 2025-04-03 PROCEDURE — 99213 OFFICE O/P EST LOW 20 MIN: CPT | Performed by: FAMILY MEDICINE

## 2025-04-03 PROCEDURE — 3078F DIAST BP <80 MM HG: CPT | Performed by: FAMILY MEDICINE

## 2025-04-03 PROCEDURE — 3074F SYST BP LT 130 MM HG: CPT | Performed by: FAMILY MEDICINE

## 2025-04-03 RX ORDER — CYANOCOBALAMIN 1000 UG/ML
1000 INJECTION, SOLUTION INTRAMUSCULAR; SUBCUTANEOUS ONCE
Status: COMPLETED | OUTPATIENT
Start: 2025-04-03 | End: 2025-04-03

## 2025-04-03 RX ORDER — PHENTERMINE HYDROCHLORIDE 37.5 MG/1
37.5 CAPSULE ORAL EVERY MORNING
Qty: 30 CAPSULE | Refills: 0 | Status: SHIPPED | OUTPATIENT
Start: 2025-04-03

## 2025-04-03 RX ADMIN — CYANOCOBALAMIN 1000 MCG: 1000 INJECTION, SOLUTION INTRAMUSCULAR; SUBCUTANEOUS at 15:48:00

## 2025-04-03 NOTE — PROGRESS NOTES
4/3/2025  3:31 PM    Judd Montaño is a 39 year old female.    Chief complaint(s):   Chief Complaint   Patient presents with    Fatigue     Patient is coming in for some fatigue and stomach pain she had for 3 days    Abdominal Pain     HPI:     Judd Montaño primary complaint is regarding fatigue.     Patient is a 39-year-old female who presents complaining of generalized fatigue Marvin and myalgias specifically back pains, generalized abdominal pain.  There has been no nausea vomiting or diarrhea.  There is no significant weight loss as well.  Reports that her menstrual cycles are normal. Patient's last menstrual period was 2025 (exact date).  She has noticed some increasing weight over time.  Denies feeling depressed.      HISTORY:  Past Medical History:    Influenza    IUD threads lost    Numbness of left foot    Palpitations    Unintentional weight loss of more than 5% body weight within 1 month    Reported 30lb weight loss in the last 1mo or so.  Only associated symptom is hair loss and some intermittent LUQ abdominal pain that radiates to the L back. No other associated symptoms      Pt uptodate on cervcial cancer screening.   Overdue for mammogram -ordered   Pt with significant family hx of breast cancer in sister x 2 in their 40s and paternal cousin dx with metastatic colon cancer at the      Past Surgical History:   Procedure Laterality Date          Cyst aspiration left  2016    Electrocardiogram, complete  2012    scanned to media      Family History   Problem Relation Age of Onset    Cancer Mother 42        brain cancer    Prostate Cancer Father 65    Cancer Father 65        prostate ca    Migraines Sister     Breast Cancer Sister 33    Cancer Maternal Grandmother         brain cancer    Prostate Cancer Maternal Uncle     Prostate Cancer Maternal Uncle     Breast Cancer Half-Sister 44    Breast Cancer Paternal Cousin Female         age 40s     Breast Cancer Paternal Cousin Female         age 40s    Colon Cancer Paternal Cousin Male 36      Social History:   Social History     Socioeconomic History    Marital status:    Tobacco Use    Smoking status: Never     Passive exposure: Never    Smokeless tobacco: Never   Vaping Use    Vaping status: Never Used   Substance and Sexual Activity    Alcohol use: Yes     Comment: rarely    Drug use: No    Sexual activity: Yes     Partners: Male     Birth control/protection: Implant     Comment: nexplanon placed 12/8   Other Topics Concern    Caffeine Concern No        Immunizations:   Immunization History   Administered Date(s) Administered    FLULAVAL 6 months & older 0.5 ml Prefilled syringe ([id number]) 12/03/2022    FLUZONE 6 months and older PFS 0.5 ml (72771) 02/02/2024    HEP B 12/17/2024    Influenza 12/13/2024    MMR 12/17/2024    Pfizer Covid-19 Vaccine 30mcg/0.3ml 12yrs+ 12/13/2024    TDAP 01/14/2016       Medications (Active prior to today's visit):  Current Outpatient Medications   Medication Sig Dispense Refill    Phentermine HCl 37.5 MG Oral Cap Take 1 capsule (37.5 mg total) by mouth every morning. 30 capsule 0    vitamin E 400 UNITS Oral Cap Take 1 capsule (400 Units total) by mouth daily. 90 capsule 3    Etonogestrel (NEXPLANON SC) Inject into the skin.      acetaminophen (TYLENOL EXTRA STRENGTH) 500 MG Oral Tab Take 2 tablets (1,000 mg total) by mouth every 6 (six) hours as needed for Pain. (Patient not taking: Reported on 4/3/2025) 60 tablet 0       Allergies:  Allergies[1]      ROS:   Review of Systems   Constitutional:  Positive for appetite change (loss) and fatigue. Negative for chills and fever.   Eyes:  Negative for visual disturbance.   Respiratory:  Negative for shortness of breath.    Cardiovascular:  Negative for chest pain.   Gastrointestinal:  Positive for abdominal pain and nausea. Negative for diarrhea and vomiting.   Genitourinary:  Negative for dysuria and menstrual problem.    Musculoskeletal:  Positive for back pain.   Skin:  Negative for rash.   Neurological:  Negative for dizziness and headaches.       PHYSICAL EXAM:   VS: /77 (BP Location: Right arm, Patient Position: Sitting, Cuff Size: adult)   Pulse 68   Temp 97 °F (36.1 °C)   Ht 5' 4\" (1.626 m)   Wt 197 lb (89.4 kg)   LMP 03/23/2025 (Exact Date)   BMI 33.81 kg/m²     Physical Exam  Vitals reviewed.   Constitutional:       General: She is not in acute distress.     Appearance: Normal appearance.   HENT:      Head: Normocephalic.   Eyes:      Conjunctiva/sclera: Conjunctivae normal.   Cardiovascular:      Rate and Rhythm: Normal rate.   Pulmonary:      Effort: Pulmonary effort is normal.   Abdominal:      General: Bowel sounds are normal.      Palpations: Abdomen is soft.      Tenderness: There is no abdominal tenderness. There is no right CVA tenderness or left CVA tenderness.   Musculoskeletal:      Cervical back: Neck supple.   Skin:     Findings: No rash.   Psychiatric:         Mood and Affect: Mood normal.         LABORATORY RESULTS:      EKG / Spirometry : -     Radiology:     ASSESSMENT/PLAN:   Assessment   Encounter Diagnoses   Name Primary?    Fatigue, unspecified type Yes    Class 1 obesity due to excess calories without serious comorbidity with body mass index (BMI) of 33.0 to 33.9 in adult          MEDICATIONS:   Requested Prescriptions     Signed Prescriptions Disp Refills    Phentermine HCl 37.5 MG Oral Cap 30 capsule 0     Sig: Take 1 capsule (37.5 mg total) by mouth every morning.   Vit B12 1 ml IM given    RECOMMENDATIONS given include: Please, call our office with any questions or concerns. Notify Dr Brown or the [facility] if there is a development of any new medical condition. Stop medication immediatley if she believes or becomes pregnant. Also, inform the doctor with any new symptoms or medications' side effects. Patient was informed to follow a low carbohydrate, low calories diet and to  maintain a  Cardiovascular exercise for 60 minutes 3-5 times a week. Consider a  if experience difficult keep track with exercise or staying at task.  Attempt to keep a schedule that includes an adequate sleep-work-physical exercise balance. Advised to increase water intake especially just before meals. Patient was educated that this will not be a temporary change, but a life time, life style change.    FOLLOW-UP: Schedule a follow-up visit in 1 month.             Orders This Visit:  No orders of the defined types were placed in this encounter.      Meds This Visit:  Requested Prescriptions     Signed Prescriptions Disp Refills    Phentermine HCl 37.5 MG Oral Cap 30 capsule 0     Sig: Take 1 capsule (37.5 mg total) by mouth every morning.       Imaging & Referrals:  None         AFSANEH JOHNSON MD       [1]   Allergies  Allergen Reactions    Amoxicillin RASH    Ibuprofen SHORTNESS OF BREATH

## 2025-04-09 ENCOUNTER — LAB ENCOUNTER (OUTPATIENT)
Dept: LAB | Age: 40
End: 2025-04-09
Attending: FAMILY MEDICINE
Payer: COMMERCIAL

## 2025-04-09 ENCOUNTER — NURSE TRIAGE (OUTPATIENT)
Dept: FAMILY MEDICINE CLINIC | Facility: CLINIC | Age: 40
End: 2025-04-09

## 2025-04-09 DIAGNOSIS — R10.12 LEFT UPPER QUADRANT ABDOMINAL PAIN: ICD-10-CM

## 2025-04-09 DIAGNOSIS — R10.12 LEFT UPPER QUADRANT ABDOMINAL PAIN: Primary | ICD-10-CM

## 2025-04-09 LAB
BILIRUB UR QL: NEGATIVE
CLARITY UR: CLEAR
GLUCOSE UR-MCNC: NORMAL MG/DL
KETONES UR-MCNC: 20 MG/DL
LEUKOCYTE ESTERASE UR QL STRIP.AUTO: NEGATIVE
NITRITE UR QL STRIP.AUTO: NEGATIVE
PH UR: 6.5 [PH] (ref 5–8)
PROT UR-MCNC: NEGATIVE MG/DL
SP GR UR STRIP: 1.01 (ref 1–1.03)
UROBILINOGEN UR STRIP-ACNC: NORMAL

## 2025-04-09 PROCEDURE — 81001 URINALYSIS AUTO W/SCOPE: CPT

## 2025-04-09 NOTE — TELEPHONE ENCOUNTER
Spoke with patient, Date of Birth verified  She was informed of MD recommendation, pt stated understanding.

## 2025-04-09 NOTE — PROGRESS NOTES
Patient calling complaining of abdominal pain mostly located on the left lower quadrant and it radiates to the back.  But the pain is mostly on the left abdominal area.  Denies any dysuria, hematuria.  No history of renal stone.  Also denies any fever, nausea, vomiting, diarrhea or constipation.  Will check urinalysis now and will prescribe acetaminophen for pain for now.

## 2025-04-09 NOTE — TELEPHONE ENCOUNTER
Action Requested: Summary for Provider     []  Critical Lab, Recommendations Needed  [x] Need Additional Advice  []   FYI    []   Need Orders  [] Need Medications Sent to Pharmacy  []  Other     SUMMARY: With  Dick #[id number], Patient saw Dr Brown on 4/3/2025 for abdominal pain. Has been having the pain for 2 weeks now. Stopped eating fatty foods and eating healthier. Also eating smaller portions, but still having abdominal pain. Still bloated. Pain is on the left side of the abdomen and radiates to the middle of her lower back. Still getting on and off shaking/weakness that lasts about 15 -30 minutes. States symptoms are the same not any worse. No new symptoms. Wanted to know what to do next?  Please advise.     Advised patient that if any new symptoms or symptoms worse to go to urgent care/emergency room for an evaluation. Patient agreed.   Reason for call: Condition Update  Onset: Data Unavailable       Reason for Disposition   MILD TO MODERATE constant pain lasting > 2 hours    Protocols used: Abdominal Pain - Female-A-[place]

## 2025-04-09 NOTE — TELEPHONE ENCOUNTER
History of cramping pain in his left lower leg  Venous doppler completed 9/22/2021 was negative for DVT  Pain has resolved at present  Continues OTC magnesium 250 mg daily  Patient calling complaining of abdominal pain mostly located on the left lower quadrant and it radiates to the back.  But the pain is mostly on the left abdominal area.  Denies any dysuria, hematuria.  No history of renal stone.  Also denies any fever, nausea, vomiting, diarrhea or constipation.  Will check urinalysis now and will prescribe acetaminophen for pain for now.

## 2025-05-05 ENCOUNTER — OFFICE VISIT (OUTPATIENT)
Dept: FAMILY MEDICINE CLINIC | Facility: CLINIC | Age: 40
End: 2025-05-05

## 2025-05-05 VITALS
WEIGHT: 191.5 LBS | HEART RATE: 71 BPM | SYSTOLIC BLOOD PRESSURE: 124 MMHG | DIASTOLIC BLOOD PRESSURE: 80 MMHG | BODY MASS INDEX: 33 KG/M2

## 2025-05-05 DIAGNOSIS — E66.09 CLASS 1 OBESITY DUE TO EXCESS CALORIES WITHOUT SERIOUS COMORBIDITY WITH BODY MASS INDEX (BMI) OF 33.0 TO 33.9 IN ADULT: Primary | ICD-10-CM

## 2025-05-05 DIAGNOSIS — E66.811 CLASS 1 OBESITY DUE TO EXCESS CALORIES WITHOUT SERIOUS COMORBIDITY WITH BODY MASS INDEX (BMI) OF 33.0 TO 33.9 IN ADULT: Primary | ICD-10-CM

## 2025-05-05 RX ORDER — PHENTERMINE HYDROCHLORIDE 37.5 MG/1
37.5 TABLET ORAL
Qty: 30 TABLET | Refills: 1 | Status: SHIPPED | OUTPATIENT
Start: 2025-05-05

## 2025-05-05 RX ORDER — CYANOCOBALAMIN 1000 UG/ML
1000 INJECTION, SOLUTION INTRAMUSCULAR; SUBCUTANEOUS ONCE
Status: COMPLETED | OUTPATIENT
Start: 2025-05-05 | End: 2025-05-05

## 2025-05-05 RX ADMIN — CYANOCOBALAMIN 1000 MCG: 1000 INJECTION, SOLUTION INTRAMUSCULAR; SUBCUTANEOUS at 10:54:00

## 2025-05-05 NOTE — PROGRESS NOTES
5/5/2025  10:36 AM    Judd Montaño is a 40 year old female.    Chief complaint(s):   Chief Complaint   Patient presents with    Medication Follow-Up     4 week f/up on weight     HPI:     Judd Montaño primary complaint is regarding weight management .     Patient is a 40-year-old female who presents complaining of generalized fatigue  and myalgias specifically back pains, generalized abdominal pain.  There has been no nausea vomiting or diarrhea.  There is no significant weight loss as well.  Reports that her menstrual cycles are normal. Patient's last menstrual period was 03/23/2025 (exact date). Loss weight, 9 lbs since March 2025. Tolerating Phentermine.     HISTORY:  Past Medical History[1]   Past Surgical History[2]   Family History[3]   Social History: Short Social Hx on File[4]     Immunizations:   Immunization History   Administered Date(s) Administered    FLULAVAL 6 months & older 0.5 ml Prefilled syringe (13035) 12/03/2022    FLUZONE 6 months and older PFS 0.5 ml (90851) 02/02/2024    HEP B 12/17/2024    Influenza 12/13/2024    MMR 12/17/2024    Pfizer Covid-19 Vaccine 30mcg/0.3ml 12yrs+ 12/13/2024    TDAP 01/14/2016       Medications (Active prior to today's visit):  Current Medications[5]    Allergies:  Allergies[6]      ROS:   Review of Systems   Constitutional:  Positive for unexpected weight change. Negative for appetite change and fever.   Eyes:  Negative for visual disturbance.   Respiratory:  Negative for shortness of breath.    Cardiovascular:  Negative for chest pain.   Gastrointestinal:  Negative for abdominal pain, nausea and vomiting.   Musculoskeletal:  Negative for back pain.   Skin:  Negative for rash.   Neurological:  Negative for dizziness and headaches.       PHYSICAL EXAM:   VS: /80   Pulse 71   Wt 191 lb 8 oz (86.9 kg)   LMP 03/23/2025 (Exact Date)   BMI 32.87 kg/m²     Physical Exam  Vitals reviewed.   Constitutional:       General: She  is not in acute distress.     Appearance: Normal appearance.   HENT:      Head: Normocephalic.   Eyes:      Conjunctiva/sclera: Conjunctivae normal.   Cardiovascular:      Rate and Rhythm: Normal rate.   Pulmonary:      Effort: Pulmonary effort is normal.   Musculoskeletal:      Cervical back: Neck supple.   Skin:     Findings: No rash.   Psychiatric:         Mood and Affect: Mood normal.         LABORATORY RESULTS:      EKG / Spirometry : -     Radiology: No results found.     ASSESSMENT/PLAN:   Assessment   Encounter Diagnosis   Name Primary?    Class 1 obesity due to excess calories without serious comorbidity with body mass index (BMI) of 33.0 to 33.9 in adult Yes         MEDICATIONS:   Requested Prescriptions     Signed Prescriptions Disp Refills    Phentermine HCl 37.5 MG Oral Tab 30 tablet 1     Sig: Take 1 tablet (37.5 mg total) by mouth every morning before breakfast.   Vit B12, 1 ml IM given    RECOMMENDATIONS given include: Please, call our office with any questions or concerns. Notify Dr Brown or the [facility] if there is a development of any new medical condition. Stop medication immediatley if she believes or becomes pregnant. Also, inform the doctor with any new symptoms or medications' side effects. Patient was informed to follow a low carbohydrate, low calories diet and to maintain a  Cardiovascular exercise for 60 minutes 3-5 times a week. Consider a  if experience difficult keep track with exercise or staying at task.  Attempt to keep a schedule that includes an adequate sleep-work-physical exercise balance. Advised to increase water intake especially just before meals. Patient was educated that this will not be a temporary change, but a life time, life style change.    FOLLOW-UP: Schedule a follow-up visit in 2 months.      .          Orders This Visit:  No orders of the defined types were placed in this encounter.      Meds This Visit:  Requested Prescriptions      Pending Prescriptions Disp Refills    cyanocobalamin (Vitamin B12) 1000 MCG/ML injection 1,000 mcg      Phentermine HCl 37.5 MG Oral Tab 30 tablet 1     Sig: Take 1 tablet (37.5 mg total) by mouth every morning before breakfast.       Imaging & Referrals:  None         AFSANEH JOHNSON MD       [1]   Past Medical History:   Influenza    IUD threads lost    Numbness of left foot    Palpitations    Unintentional weight loss of more than 5% body weight within 1 month    Reported 30lb weight loss in the last 1mo or so.  Only associated symptom is hair loss and some intermittent LUQ abdominal pain that radiates to the L back. No other associated symptoms      Pt uptodate on cervcial cancer screening.   Overdue for mammogram -ordered   Pt with significant family hx of breast cancer in sister x 2 in their 40s and paternal cousin dx with metastatic colon cancer at the   [2]   Past Surgical History:  Procedure Laterality Date          Cyst aspiration left  2016    Electrocardiogram, complete  2012    scanned to media   [3]   Family History  Problem Relation Age of Onset    Cancer Mother 42        brain cancer    Prostate Cancer Father 65    Cancer Father 65        prostate ca    Migraines Sister     Breast Cancer Sister 33    Cancer Maternal Grandmother         brain cancer    Prostate Cancer Maternal Uncle     Prostate Cancer Maternal Uncle     Breast Cancer Half-Sister 44    Breast Cancer Paternal Cousin Female         age 40s    Breast Cancer Paternal Cousin Female         age 40s    Colon Cancer Paternal Cousin Male 36   [4]   Social History  Socioeconomic History    Marital status:    Tobacco Use    Smoking status: Never     Passive exposure: Never    Smokeless tobacco: Never   Vaping Use    Vaping status: Never Used   Substance and Sexual Activity    Alcohol use: Yes     Comment: rarely    Drug use: No    Sexual activity: Yes     Partners: Male     Birth control/protection: Implant      Comment: nexplanon placed 12/8   Other Topics Concern    Caffeine Concern No   [5]   Current Outpatient Medications   Medication Sig Dispense Refill    Acetaminophen 500 MG Oral Cap Take 1 capsule (500 mg total) by mouth every 6 (six) hours as needed. 100 capsule 1    Phentermine HCl 37.5 MG Oral Cap Take 1 capsule (37.5 mg total) by mouth every morning. 30 capsule 0    vitamin E 400 UNITS Oral Cap Take 1 capsule (400 Units total) by mouth daily. 90 capsule 3    Etonogestrel (NEXPLANON SC) Inject into the skin.      acetaminophen (TYLENOL EXTRA STRENGTH) 500 MG Oral Tab Take 2 tablets (1,000 mg total) by mouth every 6 (six) hours as needed for Pain. 60 tablet 0   [6]   Allergies  Allergen Reactions    Amoxicillin RASH    Ibuprofen SHORTNESS OF BREATH

## 2025-07-07 ENCOUNTER — OFFICE VISIT (OUTPATIENT)
Dept: PODIATRY CLINIC | Facility: CLINIC | Age: 40
End: 2025-07-07

## 2025-07-07 ENCOUNTER — OFFICE VISIT (OUTPATIENT)
Dept: FAMILY MEDICINE CLINIC | Facility: CLINIC | Age: 40
End: 2025-07-07

## 2025-07-07 VITALS
HEART RATE: 78 BPM | SYSTOLIC BLOOD PRESSURE: 138 MMHG | HEIGHT: 62 IN | BODY MASS INDEX: 34.3 KG/M2 | DIASTOLIC BLOOD PRESSURE: 79 MMHG | WEIGHT: 186.38 LBS

## 2025-07-07 VITALS — SYSTOLIC BLOOD PRESSURE: 118 MMHG | DIASTOLIC BLOOD PRESSURE: 70 MMHG | HEART RATE: 68 BPM

## 2025-07-07 DIAGNOSIS — Z00.00 PHYSICAL EXAM: Primary | ICD-10-CM

## 2025-07-07 DIAGNOSIS — M72.2 PLANTAR FASCIITIS: Primary | ICD-10-CM

## 2025-07-07 PROCEDURE — 3078F DIAST BP <80 MM HG: CPT | Performed by: STUDENT IN AN ORGANIZED HEALTH CARE EDUCATION/TRAINING PROGRAM

## 2025-07-07 PROCEDURE — 99214 OFFICE O/P EST MOD 30 MIN: CPT | Performed by: STUDENT IN AN ORGANIZED HEALTH CARE EDUCATION/TRAINING PROGRAM

## 2025-07-07 PROCEDURE — 3074F SYST BP LT 130 MM HG: CPT | Performed by: STUDENT IN AN ORGANIZED HEALTH CARE EDUCATION/TRAINING PROGRAM

## 2025-07-07 RX ORDER — DEXAMETHASONE SODIUM PHOSPHATE 4 MG/ML
4 VIAL (ML) INJECTION ONCE
Status: COMPLETED | OUTPATIENT
Start: 2025-07-07 | End: 2025-07-07

## 2025-07-07 RX ORDER — TRIAMCINOLONE ACETONIDE 40 MG/ML
40 INJECTION, SUSPENSION INTRA-ARTICULAR; INTRAMUSCULAR ONCE
Status: COMPLETED | OUTPATIENT
Start: 2025-07-07 | End: 2025-07-07

## 2025-07-07 RX ORDER — PHENTERMINE HYDROCHLORIDE 37.5 MG/1
37.5 CAPSULE ORAL EVERY MORNING
Qty: 30 CAPSULE | Refills: 0 | Status: CANCELLED | OUTPATIENT
Start: 2025-07-07

## 2025-07-07 RX ADMIN — DEXAMETHASONE SODIUM PHOSPHATE 4 MG: 4 MG/ML VIAL (ML) INJECTION at 16:23:00

## 2025-07-07 RX ADMIN — TRIAMCINOLONE ACETONIDE 40 MG: 40 INJECTION, SUSPENSION INTRA-ARTICULAR; INTRAMUSCULAR at 16:24:00

## 2025-07-07 NOTE — PROGRESS NOTES
Verbal order per Dr Hobson, draw up 1ml of 1% Lidiocaine, 1ml of Dexamethasone Sodium Phosphate and 0.5ml of Kenalog 40, for a Right Heel Cortisone Injection.. Debby Huang

## 2025-07-07 NOTE — PROGRESS NOTES
[facility]  Progress Note      Judd Montaño is a 40 year old female.   Chief Complaint   Patient presents with    Foot Pain     3 month Right Heel F/U  - pain rated 4/10               HPI:     Patient is a pleasant 40-year-old female presents to clinic for right heel pain follow-up.  Admits that the last cortisone injection did provide her with major relief.  She still states that she has a 4-5 out of 10 pain on and off.  She relates that she has to walk for prolonged hours and stand for her job.  She does admit to using supportive shoes.  She does admit to stretching and icing as needed.    Allergies: Amoxicillin and Ibuprofen    Current Medications[1]   Past Medical History[2]   Past Surgical History[3]   Family History[4]   Social Hx on file[5]        REVIEW OF SYSTEMS:     Denies nause, fever, chills  No calf pain  Denies chest pain or SOB      EXAM:   [facility] 03/23/2025 (Exact Date)   GENERAL: well developed, well nourished, in no apparent distress  EXTREMITIES:   1. Integument: Normal skin temperature and turgor  2. Vascular: Dorsalis pedis two out of four bilateral and posterior tibial pulses two out of   four bilateral, capillary refill normal.   3. Musculoskeletal: All muscle groups are graded 5 out of 5 in the foot and ankle.  Pain with palpation to right medial calcaneal tubercle   4. Neurological: Normal sharp dull sensation; reflexes normal.             ASSESSMENT AND PLAN:   Diagnoses and all orders for this visit:    Plantar fasciitis  -     dexamethasone (Decadron) 4 MG/ML injection 4 mg  -     triamcinolone acetonide (Kenalog-40) 40 MG/ML injection 40 mg        Plan:     -Patient examined, chart history reviewed.  -Discussed etiology of patient's condition and various treatment options.  -Discussed importance of proper shoe gear and orthotics.  Patient to avoid walking barefoot at all times.  -Discussed importance of stretching exercises.  Patient to aim to stretch 3-5  times daily.  -Discussed steroid injection with patient including benefits and risks.  Patient agrees to injection and written consent was obtained.  -After prepping site with alcohol, administered steroid injection to right plantar heel consisting of 1 cc of 1% lidocaine plain, 1 cc of dexamethasone, and  1 cc of Kenalog 40.  Patient tolerated the injection well and there were no complications.  Site was dressed with Band-Aid.  -Wear supportive shoe gear and inserts at all times with ambulation.  Avoid walking barefoot.  - Perform stretching exercises 3-5 times daily.  Instructions dispensed.  - Monitor response to steroid injection.  - Follow-up in 4 months for reevaluation.      The patient indicates understanding of these issues and agrees to the plan.        Marlen Leigh DPM        [1]   Current Outpatient Medications   Medication Sig Dispense Refill    Acetaminophen 500 MG Oral Cap Take 1 capsule (500 mg total) by mouth every 6 (six) hours as needed. 100 capsule 1    Phentermine HCl 37.5 MG Oral Cap Take 1 capsule (37.5 mg total) by mouth every morning. 30 capsule 0    Etonogestrel (NEXPLANON SC) Inject into the skin.      acetaminophen (TYLENOL EXTRA STRENGTH) 500 MG Oral Tab Take 2 tablets (1,000 mg total) by mouth every 6 (six) hours as needed for Pain. 60 tablet 0   [2]   Past Medical History:   Influenza    IUD threads lost    Numbness of left foot    Palpitations    Unintentional weight loss of more than 5% body weight within 1 month    Reported 30lb weight loss in the last 1mo or so.  Only associated symptom is hair loss and some intermittent LUQ abdominal pain that radiates to the L back. No other associated symptoms      Pt uptodate on cervcial cancer screening.   Overdue for mammogram -ordered   Pt with significant family hx of breast cancer in sister x 2 in their 40s and paternal cousin dx with metastatic colon cancer at the   [3]   Past Surgical History:  Procedure Laterality Date       2005    Cyst aspiration left  2016    Electrocardiogram, complete  2012    scanned to media   [4]   Family History  Problem Relation Age of Onset    Cancer Mother 42        brain cancer    Prostate Cancer Father 65    Cancer Father 65        prostate ca    Migraines Sister     Breast Cancer Sister 33    Cancer Maternal Grandmother         brain cancer    Prostate Cancer Maternal Uncle     Prostate Cancer Maternal Uncle     Breast Cancer Half-Sister 44    Breast Cancer Paternal Cousin Female         age 40s    Breast Cancer Paternal Cousin Female         age 40s    Colon Cancer Paternal Cousin Male 36   [5]   Social History  Socioeconomic History    Marital status:    Tobacco Use    Smoking status: Never     Passive exposure: Never    Smokeless tobacco: Never   Vaping Use    Vaping status: Never Used   Substance and Sexual Activity    Alcohol use: Yes     Comment: ocasional    Drug use: No    Sexual activity: Yes     Partners: Male     Birth control/protection: Implant     Comment: nexplanon placed    Other Topics Concern    Caffeine Concern No

## 2025-07-07 NOTE — PROGRESS NOTES
7/7/2025  8:13 AM    Judd Montaño is a 40 year old female.    Chief complaint(s):   Chief Complaint   Patient presents with    Routine Physical    Contraception     Discuss other type of birth control     HPI:     Judd Montaño primary complaint is regarding CPE.     Shelbi Oneil is a 40 year old female present today for a routine periodic health gynecological screening.  Her last physical exam was 1 year ago. Patient's last menstrual period was March 2025.   Menarche occurred at age 14 .  She is currently using Nexplanon as a form of contraception. Wants to continue with this method.    Shelbi Oneil is G 2, P2, Ab 0.   She has a history of veneral infection significant for Chlamydia, HSV.   She performs breast self-exams monthly .  Her last TDAP (orTD) booster was 7 years ago. She is not current with her influenza immunization.  Her last Pap smear was 1 year(s) ago and was normal .  Her last mammogram was 01/2025 and was normal.  Regarding colon cancer  screening test she underwent colonoscopy none.  None smoker.       HISTORY:  Past Medical History[1]   Past Surgical History[2]   Family History[3]   Social History: Short Social Hx on File[4]     Immunizations:   Immunization History   Administered Date(s) Administered    FLULAVAL 6 months & older 0.5 ml Prefilled syringe (97513) 12/03/2022    FLUZONE 6 months and older PFS 0.5 ml (90407) 02/02/2024    HEP B 12/17/2024    Influenza 12/13/2024    MMR 12/17/2024    Pfizer Covid-19 Vaccine 30mcg/0.3ml 12yrs+ 12/13/2024    TDAP 01/14/2016       Medications (Active prior to today's visit):  Current Medications[5]    Allergies:  Allergies[6]      ROS:   Review of Systems   Constitutional:  Negative for appetite change, diaphoresis, fatigue and fever.   HENT:  Negative for hearing loss and nosebleeds.    Eyes:  Negative for visual disturbance.   Respiratory:  Negative for shortness of breath.    Cardiovascular:  Negative  for chest pain and palpitations.   Gastrointestinal:  Negative for abdominal pain.   Endocrine: Negative for polydipsia and polyuria.   Genitourinary:  Negative for hematuria.   Musculoskeletal:  Negative for arthralgias.   Skin:  Negative for rash.   Neurological:  Negative for dizziness and headaches.   Psychiatric/Behavioral:  Negative for dysphoric mood and sleep disturbance.        PHYSICAL EXAM:   VS: /79 (BP Location: Right arm, Patient Position: Sitting, Cuff Size: adult)   Pulse 78   Ht 5' 2\" (1.575 m)   Wt 186 lb 6 oz (84.5 kg)   LMP 03/23/2025 (Exact Date)   BMI 34.09 kg/m²     Physical Exam  Vitals reviewed. Exam conducted with a chaperone present.   Constitutional:       Appearance: She is well-developed.   HENT:      Head: Normocephalic.      Right Ear: Hearing, tympanic membrane, ear canal and external ear normal.      Left Ear: Hearing, tympanic membrane, ear canal and external ear normal.      Nose: Nose normal.      Mouth/Throat:      Mouth: Mucous membranes are moist.   Eyes:      Extraocular Movements: Extraocular movements intact.      Conjunctiva/sclera: Conjunctivae normal.      Pupils: Pupils are equal, round, and reactive to light.   Neck:      Thyroid: No thyromegaly.   Cardiovascular:      Rate and Rhythm: Normal rate and regular rhythm.      Heart sounds: Normal heart sounds, S1 normal and S2 normal. No murmur heard.  Pulmonary:      Effort: Pulmonary effort is normal.      Breath sounds: Normal breath sounds.   Chest:   Breasts:     Right: Tenderness present. No mass.      Left: Tenderness present. No mass.   Abdominal:      General: Bowel sounds are normal.      Palpations: Abdomen is soft. There is no mass.      Tenderness: There is no abdominal tenderness.      Hernia: No hernia is present.   Musculoskeletal:      Cervical back: Neck supple.      Comments: Spine without scoliosis or kyphosis.  Range of motions of both upper and lower extremities are normal.    Lymphadenopathy:      Cervical: No cervical adenopathy.      Comments: LEs no edema    Skin:     Findings: No rash.   Neurological:      General: No focal deficit present.      Mental Status: She is alert.      Deep Tendon Reflexes:      Reflex Scores:       Patellar reflexes are 2+ on the right side and 2+ on the left side.  Psychiatric:         Mood and Affect: Mood and affect normal.       LABORATORY RESULTS:     EKG / Spirometry : -     Radiology: No results found.     ASSESSMENT/PLAN:   Assessment   Encounter Diagnosis   Name Primary?    Physical exam Yes       Assessment and Plan:     Judd Montaño Health checkup as follows:    LABORATORY & ORDERS:   Orders Placed This Encounter   Procedures    CBC With Differential With Platelet    Comp Metabolic Panel (14)    Hemoglobin A1C    Lipid Panel    TSH W Reflex To Free T4    Vitamin D    Urinalysis with Culture Reflex     REFERRALS: generated today : None .    IMMUNIZATIONS ordered and given today include: none.    RECOMMENDATIONS given include: ANTICIPATORY GUIDANCE  topics covered today include: safety (i.e. seat belts, helmets, sunscreen, protective sports gear ), nutrition (i.e. healthy meals and snacks (i.e. avoid junk food and high-carbohydrate foods); athletic conditioning, fluids; low fat milk, limit to less than 20 oz. a day; dental care with her dentist), and healthy habits & social competence & responsibilities: Recommendations on physical activity; exercise daily or at least 3 times a week for 30-60 minutes doing cardiovascular exercise. Patient educated on self breast examination to be done on a monthly basis.  Consider a  if over weight and/or having difficult in staying active. Attempt to keep a schedule that includes adequate sleep, and physical activities/exercise. Patient was educated on sexual transmitted disease. Best to abstain from sexual intercourse until she is ready to form a family. Use of condoms may  prevent transmission of infections as well as pregnancy.   Contraception option chosen by patient was Nexplaon.  REFUSALS:  Although recommended, the patient refuses the following: none .      FOLLOW-UP: Schedule a follow-up visit in 12 months.            Orders This Visit:  Orders Placed This Encounter   Procedures    CBC With Differential With Platelet    Comp Metabolic Panel (14)    Hemoglobin A1C    Lipid Panel    TSH W Reflex To Free T4    Vitamin D    Urinalysis with Culture Reflex       Meds This Visit:  Requested Prescriptions      No prescriptions requested or ordered in this encounter       Imaging & Referrals:  None         AFSANEH JOHNSON MD         [1]   Past Medical History:   Influenza    IUD threads lost    Numbness of left foot    Palpitations    Unintentional weight loss of more than 5% body weight within 1 month    Reported 30lb weight loss in the last 1mo or so.  Only associated symptom is hair loss and some intermittent LUQ abdominal pain that radiates to the L back. No other associated symptoms      Pt uptodate on cervcial cancer screening.   Overdue for mammogram -ordered   Pt with significant family hx of breast cancer in sister x 2 in their 40s and paternal cousin dx with metastatic colon cancer at the   [2]   Past Surgical History:  Procedure Laterality Date          Cyst aspiration left  2016    Electrocardiogram, complete  2012    scanned to media   [3]   Family History  Problem Relation Age of Onset    Cancer Mother 42        brain cancer    Prostate Cancer Father 65    Cancer Father 65        prostate ca    Migraines Sister     Breast Cancer Sister 33    Cancer Maternal Grandmother         brain cancer    Prostate Cancer Maternal Uncle     Prostate Cancer Maternal Uncle     Breast Cancer Half-Sister 44    Breast Cancer Paternal Cousin Female         age 40s    Breast Cancer Paternal Cousin Female         age 40s    Colon Cancer Paternal Cousin Male 36   [4]    Social History  Socioeconomic History    Marital status:    Tobacco Use    Smoking status: Never     Passive exposure: Never    Smokeless tobacco: Never   Vaping Use    Vaping status: Never Used   Substance and Sexual Activity    Alcohol use: Yes     Comment: ocasional    Drug use: No    Sexual activity: Yes     Partners: Male     Birth control/protection: Implant     Comment: nexplanon placed 12/8   Other Topics Concern    Caffeine Concern No   [5]   Current Outpatient Medications   Medication Sig Dispense Refill    Acetaminophen 500 MG Oral Cap Take 1 capsule (500 mg total) by mouth every 6 (six) hours as needed. 100 capsule 1    Phentermine HCl 37.5 MG Oral Cap Take 1 capsule (37.5 mg total) by mouth every morning. 30 capsule 0    Etonogestrel (NEXPLANON SC) Inject into the skin.      acetaminophen (TYLENOL EXTRA STRENGTH) 500 MG Oral Tab Take 2 tablets (1,000 mg total) by mouth every 6 (six) hours as needed for Pain. 60 tablet 0   [6]   Allergies  Allergen Reactions    Amoxicillin RASH    Ibuprofen SHORTNESS OF BREATH

## 2025-07-08 ENCOUNTER — LAB ENCOUNTER (OUTPATIENT)
Dept: LAB | Age: 40
End: 2025-07-08
Attending: FAMILY MEDICINE
Payer: COMMERCIAL

## 2025-07-08 DIAGNOSIS — Z00.00 PHYSICAL EXAM: ICD-10-CM

## 2025-07-08 LAB
ALBUMIN SERPL-MCNC: 4.7 G/DL (ref 3.2–4.8)
ALBUMIN/GLOB SERPL: 2 {RATIO} (ref 1–2)
ALP LIVER SERPL-CCNC: 67 U/L (ref 37–98)
ALT SERPL-CCNC: 12 U/L (ref 10–49)
ANION GAP SERPL CALC-SCNC: 10 MMOL/L (ref 0–18)
AST SERPL-CCNC: 13 U/L (ref ?–34)
BASOPHILS # BLD AUTO: 0.02 X10(3) UL (ref 0–0.2)
BASOPHILS NFR BLD AUTO: 0.2 %
BILIRUB SERPL-MCNC: 0.4 MG/DL (ref 0.3–1.2)
BILIRUB UR QL: NEGATIVE
BUN BLD-MCNC: 8 MG/DL (ref 9–23)
BUN/CREAT SERPL: 11.1 (ref 10–20)
CALCIUM BLD-MCNC: 9 MG/DL (ref 8.7–10.4)
CHLORIDE SERPL-SCNC: 101 MMOL/L (ref 98–112)
CHOLEST SERPL-MCNC: 206 MG/DL (ref ?–200)
CLARITY UR: CLEAR
CO2 SERPL-SCNC: 26 MMOL/L (ref 21–32)
COLOR UR: COLORLESS
CREAT BLD-MCNC: 0.72 MG/DL (ref 0.55–1.02)
DEPRECATED RDW RBC AUTO: 39.9 FL (ref 35.1–46.3)
EGFRCR SERPLBLD CKD-EPI 2021: 108 ML/MIN/1.73M2 (ref 60–?)
EOSINOPHIL # BLD AUTO: 0.02 X10(3) UL (ref 0–0.7)
EOSINOPHIL NFR BLD AUTO: 0.2 %
ERYTHROCYTE [DISTWIDTH] IN BLOOD BY AUTOMATED COUNT: 12.1 % (ref 11–15)
EST. AVERAGE GLUCOSE BLD GHB EST-MCNC: 108 MG/DL (ref 68–126)
FASTING PATIENT LIPID ANSWER: YES
FASTING STATUS PATIENT QL REPORTED: YES
GLOBULIN PLAS-MCNC: 2.3 G/DL (ref 2–3.5)
GLUCOSE BLD-MCNC: 104 MG/DL (ref 70–99)
GLUCOSE UR-MCNC: NORMAL MG/DL
HBA1C MFR BLD: 5.4 % (ref ?–5.7)
HCT VFR BLD AUTO: 36.7 % (ref 35–48)
HDLC SERPL-MCNC: 53 MG/DL (ref 40–59)
HGB BLD-MCNC: 12.4 G/DL (ref 12–16)
IMM GRANULOCYTES # BLD AUTO: 0.04 X10(3) UL (ref 0–1)
IMM GRANULOCYTES NFR BLD: 0.4 %
KETONES UR-MCNC: NEGATIVE MG/DL
LDLC SERPL CALC-MCNC: 125 MG/DL (ref ?–100)
LEUKOCYTE ESTERASE UR QL STRIP.AUTO: NEGATIVE
LYMPHOCYTES # BLD AUTO: 3.22 X10(3) UL (ref 1–4)
LYMPHOCYTES NFR BLD AUTO: 29.7 %
MCH RBC QN AUTO: 30.6 PG (ref 26–34)
MCHC RBC AUTO-ENTMCNC: 33.8 G/DL (ref 31–37)
MCV RBC AUTO: 90.6 FL (ref 80–100)
MONOCYTES # BLD AUTO: 0.6 X10(3) UL (ref 0.1–1)
MONOCYTES NFR BLD AUTO: 5.5 %
NEUTROPHILS # BLD AUTO: 6.93 X10 (3) UL (ref 1.5–7.7)
NEUTROPHILS # BLD AUTO: 6.93 X10(3) UL (ref 1.5–7.7)
NEUTROPHILS NFR BLD AUTO: 64 %
NITRITE UR QL STRIP.AUTO: NEGATIVE
NONHDLC SERPL-MCNC: 153 MG/DL (ref ?–130)
OSMOLALITY SERPL CALC.SUM OF ELEC: 283 MOSM/KG (ref 275–295)
PH UR: 6.5 [PH] (ref 5–8)
PLATELET # BLD AUTO: 275 10(3)UL (ref 150–450)
POTASSIUM SERPL-SCNC: 3.3 MMOL/L (ref 3.5–5.1)
PROT SERPL-MCNC: 7 G/DL (ref 5.7–8.2)
PROT UR-MCNC: NEGATIVE MG/DL
RBC # BLD AUTO: 4.05 X10(6)UL (ref 3.8–5.3)
SODIUM SERPL-SCNC: 137 MMOL/L (ref 136–145)
SP GR UR STRIP: <1.005 (ref 1–1.03)
TRIGL SERPL-MCNC: 157 MG/DL (ref 30–149)
TSI SER-ACNC: 0.58 UIU/ML (ref 0.55–4.78)
UROBILINOGEN UR STRIP-ACNC: NORMAL
VIT D+METAB SERPL-MCNC: 20.7 NG/ML (ref 30–100)
VLDLC SERPL CALC-MCNC: 28 MG/DL (ref 0–30)
WBC # BLD AUTO: 10.8 X10(3) UL (ref 4–11)

## 2025-07-08 PROCEDURE — 84443 ASSAY THYROID STIM HORMONE: CPT

## 2025-07-08 PROCEDURE — 83036 HEMOGLOBIN GLYCOSYLATED A1C: CPT

## 2025-07-08 PROCEDURE — 81001 URINALYSIS AUTO W/SCOPE: CPT

## 2025-07-08 PROCEDURE — 85025 COMPLETE CBC W/AUTO DIFF WBC: CPT

## 2025-07-08 PROCEDURE — 80053 COMPREHEN METABOLIC PANEL: CPT

## 2025-07-08 PROCEDURE — 80061 LIPID PANEL: CPT

## 2025-07-08 PROCEDURE — 36415 COLL VENOUS BLD VENIPUNCTURE: CPT

## 2025-07-08 PROCEDURE — 82306 VITAMIN D 25 HYDROXY: CPT

## 2025-07-14 ENCOUNTER — OFFICE VISIT (OUTPATIENT)
Dept: FAMILY MEDICINE CLINIC | Facility: CLINIC | Age: 40
End: 2025-07-14

## 2025-07-14 VITALS
DIASTOLIC BLOOD PRESSURE: 72 MMHG | SYSTOLIC BLOOD PRESSURE: 115 MMHG | HEIGHT: 62 IN | HEART RATE: 60 BPM | BODY MASS INDEX: 34.3 KG/M2 | TEMPERATURE: 97 F | WEIGHT: 186.38 LBS

## 2025-07-14 DIAGNOSIS — E66.811 CLASS 1 OBESITY DUE TO EXCESS CALORIES WITHOUT SERIOUS COMORBIDITY WITH BODY MASS INDEX (BMI) OF 33.0 TO 33.9 IN ADULT: Primary | ICD-10-CM

## 2025-07-14 DIAGNOSIS — E78.00 PURE HYPERCHOLESTEROLEMIA: ICD-10-CM

## 2025-07-14 DIAGNOSIS — E66.09 CLASS 1 OBESITY DUE TO EXCESS CALORIES WITHOUT SERIOUS COMORBIDITY WITH BODY MASS INDEX (BMI) OF 33.0 TO 33.9 IN ADULT: Primary | ICD-10-CM

## 2025-07-14 RX ORDER — CYANOCOBALAMIN 1000 UG/ML
1000 INJECTION, SOLUTION INTRAMUSCULAR; SUBCUTANEOUS ONCE
Status: COMPLETED | OUTPATIENT
Start: 2025-07-14 | End: 2025-07-14

## 2025-07-14 RX ORDER — NALTREXONE HYDROCHLORIDE AND BUPROPION HYDROCHLORIDE 8; 90 MG/1; MG/1
2 TABLET, EXTENDED RELEASE ORAL 2 TIMES DAILY
Qty: 120 TABLET | Refills: 0 | Status: SHIPPED | OUTPATIENT
Start: 2025-07-14 | End: 2025-08-13

## 2025-07-14 RX ORDER — NALTREXONE HYDROCHLORIDE AND BUPROPION HYDROCHLORIDE 8; 90 MG/1; MG/1
TABLET, EXTENDED RELEASE ORAL
Qty: 70 TABLET | Refills: 0 | Status: SHIPPED | OUTPATIENT
Start: 2025-07-14 | End: 2025-08-13

## 2025-07-14 RX ADMIN — CYANOCOBALAMIN 1000 MCG: 1000 INJECTION, SOLUTION INTRAMUSCULAR; SUBCUTANEOUS at 13:31:00

## 2025-07-14 NOTE — PROGRESS NOTES
7/14/2025  1:17 PM    Judd Montaño is a 40 year old female.    Chief complaint(s):   Chief Complaint   Patient presents with    Follow - Up     Patient is coming in for a follow up about her weight     HPI:     Judd Montaño primary complaint is regarding weight management.     Patient is a 40-year-old female who presents for follow-up regarding weight management.  In the past she was just on phentermine but now is going to try new medication since her weight has not changed.  Advised her to continue doing exercises running more than just walking and to avoid a high carb load and do a low calorie diet.  Also the recent labs shows to have high cholesterol at 206 total which is minimal.  No history of diabetes.  Normal thyroid function test.  Patient's last menstrual period was 03/23/2025 (exact date) using Nexplanon.       HISTORY:  Past Medical History[1]   Past Surgical History[2]   Family History[3]   Social History: Short Social Hx on File[4]     Immunizations:   Immunization History   Administered Date(s) Administered    FLULAVAL 6 months & older 0.5 ml Prefilled syringe (67681) 12/03/2022    FLUZONE 6 months and older PFS 0.5 ml (89232) 02/02/2024    HEP B 12/17/2024    Influenza 12/13/2024    MMR 12/17/2024    Pfizer Covid-19 Vaccine 30mcg/0.3ml 12yrs+ 12/13/2024    TDAP 01/14/2016       Medications (Active prior to today's visit):  Current Medications[5]    Allergies:  Allergies[6]      ROS:   Review of Systems   Constitutional:  Positive for unexpected weight change (obese). Negative for appetite change and fever.   Eyes:  Negative for visual disturbance.   Respiratory:  Negative for shortness of breath.    Cardiovascular:  Negative for chest pain.   Gastrointestinal:  Negative for abdominal pain, nausea and vomiting.   Musculoskeletal:  Negative for back pain.   Skin:  Negative for rash.   Neurological:  Negative for dizziness and headaches.       PHYSICAL EXAM:   VS: BP  115/72 (BP Location: Right arm, Patient Position: Sitting, Cuff Size: adult)   Pulse 60   Temp 97 °F (36.1 °C)   Ht 5' 2\" (1.575 m)   Wt 186 lb 6.4 oz (84.6 kg)   LMP 03/23/2025 (Exact Date)   BMI 34.09 kg/m²     Physical Exam  Vitals reviewed.   Constitutional:       General: She is not in acute distress.     Appearance: Normal appearance. She is obese.   HENT:      Head: Normocephalic.   Eyes:      Conjunctiva/sclera: Conjunctivae normal.   Cardiovascular:      Rate and Rhythm: Normal rate.   Pulmonary:      Effort: Pulmonary effort is normal.   Musculoskeletal:      Cervical back: Neck supple.   Skin:     Findings: No rash.   Psychiatric:         Mood and Affect: Mood normal.         LABORATORY RESULTS:     Results for orders placed or performed in visit on 07/08/25   CBC With Differential With Platelet    Collection Time: 07/08/25 12:30 PM   Result Value Ref Range    WBC 10.8 4.0 - 11.0 x10(3) uL    RBC 4.05 3.80 - 5.30 x10(6)uL    HGB 12.4 12.0 - 16.0 g/dL    HCT 36.7 35.0 - 48.0 %    MCV 90.6 80.0 - 100.0 fL    MCH 30.6 26.0 - 34.0 pg    MCHC 33.8 31.0 - 37.0 g/dL    RDW-SD 39.9 35.1 - 46.3 fL    RDW 12.1 11.0 - 15.0 %    .0 150.0 - 450.0 10(3)uL    Neutrophil Absolute Prelim 6.93 1.50 - 7.70 x10 (3) uL    Neutrophil Absolute 6.93 1.50 - 7.70 x10(3) uL    Lymphocyte Absolute 3.22 1.00 - 4.00 x10(3) uL    Monocyte Absolute 0.60 0.10 - 1.00 x10(3) uL    Eosinophil Absolute 0.02 0.00 - 0.70 x10(3) uL    Basophil Absolute 0.02 0.00 - 0.20 x10(3) uL    Immature Granulocyte Absolute 0.04 0.00 - 1.00 x10(3) uL    Neutrophil % 64.0 %    Lymphocyte % 29.7 %    Monocyte % 5.5 %    Eosinophil % 0.2 %    Basophil % 0.2 %    Immature Granulocyte % 0.4 %   Comp Metabolic Panel (14)    Collection Time: 07/08/25 12:30 PM   Result Value Ref Range    Glucose 104 (H) 70 - 99 mg/dL    Sodium 137 136 - 145 mmol/L    Potassium 3.3 (L) 3.5 - 5.1 mmol/L    Chloride 101 98 - 112 mmol/L    CO2 26.0 21.0 - 32.0 mmol/L     Anion Gap 10 0 - 18 mmol/L    BUN 8 (L) 9 - 23 mg/dL    Creatinine 0.72 0.55 - 1.02 mg/dL    BUN/CREA Ratio 11.1 10.0 - 20.0    Calcium, Total 9.0 8.7 - 10.4 mg/dL    Calculated Osmolality 283 275 - 295 mOsm/kg    eGFR-Cr 108 >=60 mL/min/1.73m2    ALT 12 10 - 49 U/L    AST 13 <34 U/L    Alkaline Phosphatase 67 37 - 98 U/L    Bilirubin, Total 0.4 0.3 - 1.2 mg/dL    Total Protein 7.0 5.7 - 8.2 g/dL    Albumin 4.7 3.2 - 4.8 g/dL    Globulin  2.3 2.0 - 3.5 g/dL    A/G Ratio 2.0 1.0 - 2.0    Patient Fasting for CMP? Yes    Hemoglobin A1C    Collection Time: 07/08/25 12:30 PM   Result Value Ref Range    HgbA1C 5.4 <5.7 %    Estimated Average Glucose 108 68 - 126 mg/dL   Lipid Panel    Collection Time: 07/08/25 12:30 PM   Result Value Ref Range    Cholesterol, Total 206 (H) <200 mg/dL    HDL Cholesterol 53 40 - 59 mg/dL    Triglycerides 157 (H) 30 - 149 mg/dL    LDL Cholesterol 125 (H) <100 mg/dL    VLDL 28 0 - 30 mg/dL    Non HDL Chol 153 (H) <130 mg/dL    Patient Fasting for Lipid? Yes    TSH W Reflex To Free T4    Collection Time: 07/08/25 12:30 PM   Result Value Ref Range    TSH 0.578 0.550 - 4.780 uIU/mL   Urinalysis with Culture Reflex    Collection Time: 07/08/25 12:30 PM    Specimen: Urine   Result Value Ref Range    Urine Color Colorless (A) Yellow    Clarity Urine Clear Clear    Spec Gravity <1.005 (L) 1.005 - 1.030    Glucose Urine Normal Normal mg/dL    Bilirubin Urine Negative Negative    Ketones Urine Negative Negative mg/dL    Blood Urine Trace (A) Negative    pH Urine 6.5 5.0 - 8.0    Protein Urine Negative Negative mg/dL    Urobilinogen Urine Normal Normal    Nitrite Urine Negative Negative    Leukocyte Esterase Urine Negative Negative    WBC Urine 1-5 0 - 5 /HPF    RBC Urine 0-2 0 - 2 /HPF    Bacteria Urine None Seen None Seen /HPF    Squamous Epi. Cells Few (A) None Seen /HPF    Renal Tubular Epithelial Cells None Seen None Seen /HPF    Transitional Cells None Seen None Seen /HPF    Yeast Urine None Seen  None Seen /HPF   Vitamin D, 25-Hydroxy    Collection Time: 07/08/25 12:30 PM   Result Value Ref Range    Vitamin D, 25OH, Total 20.7 (L) 30.0 - 100.0 ng/mL       EKG / Spirometry : -     Radiology: No results found.     ASSESSMENT/PLAN:   Assessment   Encounter Diagnoses   Name Primary?    Class 1 obesity due to excess calories without serious comorbidity with body mass index (BMI) of 33.0 to 33.9 in adult Yes    Pure hypercholesterolemia        MEDICATIONS:   Requested Prescriptions     Signed Prescriptions Disp Refills    Naltrexone-buPROPion HCl ER (CONTRAVE) 8-90 MG Oral Tablet 12 Hr 70 tablet 0     Sig: Week 1: 1 tablet in AM      None in PM Week 2: 1 tablet in AM      1 tablet in PM Week 3: 2 tablets in AM    1 tablet in PM Week 4: [place] 2 tablets in AM     2 tablets in PM    Naltrexone-buPROPion HCl ER (CONTRAVE) 8-90 MG Oral Tablet 12 Hr 120 tablet 0     Sig: Take 2 tablets by mouth 2 (two) times daily.   Vit B12, 1 ml IM given today  RECOMMENDATIONS given include: Please, call our office with any questions or concerns. Notify Dr Brown or the [facility] if there is a development of any new medical condition. Stop medication immediatley if she believes or becomes pregnant. Also, inform the doctor with any new symptoms or medications' side effects. Patient was informed to follow a low carbohydrate, low calories diet and to maintain a  Cardiovascular exercise for 60 minutes 3-5 times a week. Consider a  if experience difficult keep track with exercise or staying at task.  Attempt to keep a schedule that includes an adequate sleep-work-physical exercise balance. Advised to increase water intake especially just before meals. Patient was educated that this will not be a temporary change, but a life time, life style change.    FOLLOW-UP: Schedule a follow-up visit in 3 months.             Orders This Visit:  No orders of the defined types were placed in this encounter.      Meds This  Visit:  Requested Prescriptions     Signed Prescriptions Disp Refills    Naltrexone-buPROPion HCl ER (CONTRAVE) 8-90 MG Oral Tablet 12 Hr 70 tablet 0     Sig: Week 1: 1 tablet in AM      None in PM Week 2: 1 tablet in AM      1 tablet in PM Week 3: 2 tablets in AM    1 tablet in PM Week 4: [place] 2 tablets in AM     2 tablets in PM    Naltrexone-buPROPion HCl ER (CONTRAVE) 8-90 MG Oral Tablet 12 Hr 120 tablet 0     Sig: Take 2 tablets by mouth 2 (two) times daily.       Imaging & Referrals:  None         AFSANEH JOHNSON MD         [1]   Past Medical History:   Influenza    IUD threads lost    Numbness of left foot    Palpitations    Unintentional weight loss of more than 5% body weight within 1 month    Reported 30lb weight loss in the last 1mo or so.  Only associated symptom is hair loss and some intermittent LUQ abdominal pain that radiates to the L back. No other associated symptoms      Pt uptodate on cervcial cancer screening.   Overdue for mammogram -ordered   Pt with significant family hx of breast cancer in sister x 2 in their 40s and paternal cousin dx with metastatic colon cancer at the   [2]   Past Surgical History:  Procedure Laterality Date          Cyst aspiration left  2016    Electrocardiogram, complete  2012    scanned to media   [3]   Family History  Problem Relation Age of Onset    Cancer Mother 42        brain cancer    Prostate Cancer Father 65    Cancer Father 65        prostate ca    Migraines Sister     Breast Cancer Sister 33    Cancer Maternal Grandmother         brain cancer    Prostate Cancer Maternal Uncle     Prostate Cancer Maternal Uncle     Breast Cancer Half-Sister 44    Breast Cancer Paternal Cousin Female         age 40s    Breast Cancer Paternal Cousin Female         age 40s    Colon Cancer Paternal Cousin Male 36   [4]   Social History  Socioeconomic History    Marital status:    Tobacco Use    Smoking status: Never     Passive exposure: Never     Smokeless tobacco: Never   Vaping Use    Vaping status: Never Used   Substance and Sexual Activity    Alcohol use: Yes     Comment: ocasional    Drug use: No    Sexual activity: Yes     Partners: Male     Birth control/protection: Implant     Comment: nexplanon placed 12/8   Other Topics Concern    Caffeine Concern No   [5]   Current Outpatient Medications   Medication Sig Dispense Refill    Naltrexone-buPROPion HCl ER (CONTRAVE) 8-90 MG Oral Tablet 12 Hr Week 1: 1 tablet in AM      None in PM Week 2: 1 tablet in AM      1 tablet in PM Week 3: 2 tablets in AM    1 tablet in PM Week 4: [place] 2 tablets in AM     2 tablets in PM 70 tablet 0    Naltrexone-buPROPion HCl ER (CONTRAVE) 8-90 MG Oral Tablet 12 Hr Take 2 tablets by mouth 2 (two) times daily. 120 tablet 0    Acetaminophen 500 MG Oral Cap Take 1 capsule (500 mg total) by mouth every 6 (six) hours as needed. 100 capsule 1    Etonogestrel (NEXPLANON SC) Inject into the skin.      acetaminophen (TYLENOL EXTRA STRENGTH) 500 MG Oral Tab Take 2 tablets (1,000 mg total) by mouth every 6 (six) hours as needed for Pain. 60 tablet 0    Phentermine HCl 37.5 MG Oral Cap Take 1 capsule (37.5 mg total) by mouth every morning. (Patient not taking: Reported on 7/14/2025) 30 capsule 0   [6]   Allergies  Allergen Reactions    Amoxicillin RASH    Ibuprofen SHORTNESS OF BREATH

## 2025-07-15 ENCOUNTER — TELEPHONE (OUTPATIENT)
Dept: FAMILY MEDICINE CLINIC | Facility: CLINIC | Age: 40
End: 2025-07-15

## 2025-07-15 NOTE — TELEPHONE ENCOUNTER
Plan does not cover this medication.  Please call plan at [phone] to initiate prior auth or call/fax pharmacy to change medication.        Naltrexone-buPROPion HCl ER (CONTRAVE) 8-90 MG Oral Tablet 12 Hr, Take 2 tablets by mouth 2 (two) times daily., Disp: 120 tablet, Rfl: 0

## 2025-07-15 NOTE — TELEPHONE ENCOUNTER
Bin [id number]  [facility] [place]  [facility] [id number]  ID [id number]

## 2025-07-15 NOTE — TELEPHONE ENCOUNTER
No pharmacy benefits on file.   The eligibility search did not return any results. The patient might not have insurance or might have insurance that doesn't send pharmacy benefits.

## 2025-08-12 ENCOUNTER — HOSPITAL ENCOUNTER (OUTPATIENT)
Dept: MAMMOGRAPHY | Facility: HOSPITAL | Age: 40
Discharge: HOME | End: 2025-08-12
Attending: SURGERY

## 2025-08-12 DIAGNOSIS — N60.02 BREAST CYST, LEFT: ICD-10-CM

## 2025-08-12 DIAGNOSIS — N64.4 BREAST PAIN, LEFT: ICD-10-CM

## 2025-08-12 PROCEDURE — 77066 DX MAMMO INCL CAD BI: CPT | Performed by: SURGERY

## 2025-08-12 PROCEDURE — 77062 BREAST TOMOSYNTHESIS BI: CPT | Performed by: SURGERY

## 2025-08-15 ENCOUNTER — HOSPITAL ENCOUNTER (OUTPATIENT)
Dept: GENERAL RADIOLOGY | Age: 40
Discharge: HOME | End: 2025-08-15
Attending: FAMILY MEDICINE

## 2025-08-15 ENCOUNTER — OFFICE VISIT (OUTPATIENT)
Dept: FAMILY MEDICINE CLINIC | Facility: CLINIC | Age: 40
End: 2025-08-15

## 2025-08-15 ENCOUNTER — TELEPHONE (OUTPATIENT)
Facility: CLINIC | Age: 40
End: 2025-08-15

## 2025-08-15 ENCOUNTER — LAB ENCOUNTER (OUTPATIENT)
Dept: LAB | Age: 40
End: 2025-08-15
Attending: FAMILY MEDICINE

## 2025-08-15 VITALS
TEMPERATURE: 98 F | RESPIRATION RATE: 16 BRPM | HEIGHT: 62 IN | DIASTOLIC BLOOD PRESSURE: 62 MMHG | BODY MASS INDEX: 33.86 KG/M2 | WEIGHT: 184 LBS | HEART RATE: 60 BPM | SYSTOLIC BLOOD PRESSURE: 109 MMHG

## 2025-08-15 DIAGNOSIS — R59.0 CERVICAL LYMPHADENOPATHY: ICD-10-CM

## 2025-08-15 DIAGNOSIS — M54.2 NECK PAIN: Primary | ICD-10-CM

## 2025-08-15 LAB
BASOPHILS # BLD AUTO: 0.04 X10(3) UL (ref 0–0.2)
BASOPHILS NFR BLD AUTO: 0.5 %
CONTROL LINE PRESENT WITH A CLEAR BACKGROUND (YES/NO): YES YES/NO
DEPRECATED RDW RBC AUTO: 38.8 FL (ref 35.1–46.3)
EOSINOPHIL # BLD AUTO: 0.05 X10(3) UL (ref 0–0.7)
EOSINOPHIL NFR BLD AUTO: 0.7 %
ERYTHROCYTE [DISTWIDTH] IN BLOOD BY AUTOMATED COUNT: 11.6 % (ref 11–15)
HCT VFR BLD AUTO: 37 % (ref 35–48)
HGB BLD-MCNC: 12.7 G/DL (ref 12–16)
IMM GRANULOCYTES # BLD AUTO: 0.02 X10(3) UL (ref 0–1)
IMM GRANULOCYTES NFR BLD: 0.3 %
KIT LOT #: NORMAL NUMERIC
LYMPHOCYTES # BLD AUTO: 2.48 X10(3) UL (ref 1–4)
LYMPHOCYTES NFR BLD AUTO: 33.8 %
MCH RBC QN AUTO: 31.2 PG (ref 26–34)
MCHC RBC AUTO-ENTMCNC: 34.3 G/DL (ref 31–37)
MCV RBC AUTO: 90.9 FL (ref 80–100)
MONOCYTES # BLD AUTO: 0.38 X10(3) UL (ref 0.1–1)
MONOCYTES NFR BLD AUTO: 5.2 %
NEUTROPHILS # BLD AUTO: 4.36 X10 (3) UL (ref 1.5–7.7)
NEUTROPHILS # BLD AUTO: 4.36 X10(3) UL (ref 1.5–7.7)
NEUTROPHILS NFR BLD AUTO: 59.5 %
PLATELET # BLD AUTO: 256 10(3)UL (ref 150–450)
RBC # BLD AUTO: 4.07 X10(6)UL (ref 3.8–5.3)
STREP GRP A CUL-SCR: NEGATIVE
WBC # BLD AUTO: 7.3 X10(3) UL (ref 4–11)

## 2025-08-15 PROCEDURE — 3008F BODY MASS INDEX DOCD: CPT | Performed by: FAMILY MEDICINE

## 2025-08-15 PROCEDURE — 3078F DIAST BP <80 MM HG: CPT | Performed by: FAMILY MEDICINE

## 2025-08-15 PROCEDURE — 3074F SYST BP LT 130 MM HG: CPT | Performed by: FAMILY MEDICINE

## 2025-08-15 PROCEDURE — 99213 OFFICE O/P EST LOW 20 MIN: CPT | Performed by: FAMILY MEDICINE

## 2025-08-15 PROCEDURE — 71046 X-RAY EXAM CHEST 2 VIEWS: CPT | Performed by: FAMILY MEDICINE

## 2025-08-15 PROCEDURE — 87880 STREP A ASSAY W/OPTIC: CPT | Performed by: FAMILY MEDICINE

## 2025-08-15 PROCEDURE — 36415 COLL VENOUS BLD VENIPUNCTURE: CPT

## 2025-08-15 PROCEDURE — 85025 COMPLETE CBC W/AUTO DIFF WBC: CPT

## 2025-08-18 ENCOUNTER — HOSPITAL ENCOUNTER (OUTPATIENT)
Dept: ULTRASOUND IMAGING | Facility: HOSPITAL | Age: 40
Discharge: HOME | End: 2025-08-18
Attending: FAMILY MEDICINE

## 2025-08-18 DIAGNOSIS — M54.2 NECK PAIN: ICD-10-CM

## 2025-08-18 PROCEDURE — 76536 US EXAM OF HEAD AND NECK: CPT | Performed by: FAMILY MEDICINE

## 2025-08-19 ENCOUNTER — OFFICE VISIT (OUTPATIENT)
Dept: OTOLARYNGOLOGY | Facility: CLINIC | Age: 40
End: 2025-08-19

## 2025-08-19 DIAGNOSIS — M54.2 NECK PAIN: Primary | ICD-10-CM

## 2025-08-19 PROCEDURE — 99203 OFFICE O/P NEW LOW 30 MIN: CPT | Performed by: OTOLARYNGOLOGY

## 2025-08-19 RX ORDER — CYCLOBENZAPRINE HCL 5 MG
5 TABLET ORAL NIGHTLY
Qty: 30 TABLET | Refills: 1 | Status: SHIPPED | OUTPATIENT
Start: 2025-08-19

## (undated) NOTE — MR AVS SNAPSHOT
[facility] [employer] [facility]  [address], [address]  Mick Vazquez [phone]               Thank you for choosing us for your health care visit with Mick Cole MD, MD.  We are glad to serve you and happy to provide you with this summary of

## (undated) NOTE — LETTER
12/14/2020          To Whom It May Concern:    Brody Cota is currently under my medical care and may not return to work at this time. She may return to work on 12/14/20.     Per CDC guidelines re-testing is not required    Activity

## (undated) NOTE — LETTER
AUTHORIZATION FOR SURGICAL OPERATION OR OTHER PROCEDURE    1. I hereby authorize Dr. Leigh, and [facility] staff assigned to my case to perform the following operation and/or procedure at the [facility] site:    _______________________________________________________________________________________________    Right Heel Cortisone Injection  _______________________________________________________________________________________________    2.  My physician has explained the nature and purpose of the operation or other procedure, possible alternative methods of treatment, the risks involved, and the possibility of complication to me.  I acknowledge that no guarantee has been made as to the result that may be obtained.  3.  I recognize that, during the course of this operation, or other procedure, unforseen conditions may necessitate additional or different procedure than those listed above.  I, therefore, further authorize and request that the above named physician, his/her physician assistants or designees perform such procedures as are, in his/her professional opinion, necessary and desirable.  4.  Any tissue or organs removed in the operation or other procedure may be disposed of by and at the discretion of the [facility] and [facility].  5.  I understand that in the event of a medical emergency, I will be transported by local paramedics to [facility] or other hospital emergency department.  6.  I certify that I have read and fully understand the above consent to operation and/or other procedure.    7.  I acknowledge that my physician has explained sedation/analgesia administration to me including the risks and benefits.  I consent to the administration of sedation/analgesia as may be necessary or desirable in the judgement of my physician.    Witness signature: ___________________________________________________ Date:   ______/______/_____                    Time:  ________ A.M.  P.M.       Patient Name:  ______________________________________________________  (please print)      Patient signature:  ___________________________________________________             Relationship to Patient:           []  Parent    Responsible person                          []  Spouse  In case of minor or                    [] Other  _____________   Incompetent name:  __________________________________________________                               (please print)      _____________      Responsible person  In case of minor or  Incompetent signature:  _______________________________________________    Statement of Physician  My signature below affirms that prior to the time of the procedure, I have explained to the patient and/or his/her guardian, the risks and benefits involved in the proposed treatment and any reasonable alternative to the proposed treatment.  I have also explained the risks and benefits involved in the refusal of the proposed treatment and have answered the patient's questions.                        Date:  ______/______/_______  Provider                      Signature:  __________________________________________________________       Time:  ___________ A.M    P.M.

## (undated) NOTE — LETTER
AUTHORIZATION FOR SURGICAL OPERATION OR OTHER PROCEDURE    1. I hereby authorize Dr. Usha Briscoe, and [address] [place] [address] staff assigned to my case to perform the following operation and/or procedure at the [address] [place] [address]:        _______________________________________________________________________________________________    2. My physician has explained the nature and purpose of the operation or other procedure, possible alternative methods of treatment, the risks involved, and the possibility of complication to me. I acknowledge that no guarantee has been made as to the result that may be obtained. 3.  I recognize that, during the course of this operation, or other procedure, unforseen conditions may necessitate additional or different procedure than those listed above. I, therefore, further authorize and request that the above named physician, his/her physician assistants or designees perform such procedures as are, in his/her professional opinion, necessary and desirable. 4.  Any tissue or organs removed in the operation or other procedure may be disposed of by and at the discretion of the [address] [place] [address] and [facility]. 5.  I understand that in the event of a medical emergency, I will be transported by local paramedics to [facility] or other hospital emergency department. 6.  I certify that I have read and fully understand the above consent to operation and/or other procedure. 7.  I acknowledge that my physician has explained sedation/analgesia administration to me including the risks and benefits. I consent to the administration of sedation/analgesia as may be necessary or desirable in the judgement of my physician. Witness signature: ___________________________________________________ Date:  ______/______/_____                    Time:  ________ A. M.  P.M.        Patient Name:  ______________________________________________________  (please print)      Patient signature:  ___________________________________________________             Relationship to Patient:           []  Parent    Responsible person                          []  Spouse  In case of minor or                    [] Other  _____________   Incompetent name:  __________________________________________________                               (please print)      _____________      Responsible person  In case of minor or  Incompetent signature:  _______________________________________________    Statement of Physician  My signature below affirms that prior to the time of the procedure, I have explained to the patient and/or his/her guardian, the risks and benefits involved in the proposed treatment and any reasonable alternative to the proposed treatment. I have also explained the risks and benefits involved in the refusal of the proposed treatment and have answered the patient's questions.                         Date:  ______/______/_______  Provider                      Signature:  __________________________________________________________       Time:  ___________ A.M    P.M.

## (undated) NOTE — LETTER
3/12/2019          To Whom It May Concern:    Stuart Alonzo is currently under my medical care and may not return to work at this time. Please excuse Tye Mckeon for 2 days. She may return to work on 3/15/19. Activity is restricted as follows: none.

## (undated) NOTE — LETTER
AUTHORIZATION FOR SURGICAL OPERATION OR OTHER PROCEDURE    1.  [facility] my case to perform the following operation and/or procedure at the [facility]:    acupuncture    _____________ Time:  ________ A. M.  P.M.        Patient Name:  ______________________________________________________  (please print)      Patient signature:  ___________________________________________________             Relationship to Patient:

## (undated) NOTE — LETTER
8/21/2017              Shelbi Oneil        [address] [address]         Dear Katherine White,    It was a pleasure to see you. Your PAP test with HPV was normal.  There is no need for further testing at this time.   I look f

## (undated) NOTE — MR AVS SNAPSHOT
[facility] [place] [address] [place]  [id number] [phone]               Thank you for choosing us for your health care visit with Rahgu Shane MD.  We are glad to serve you and happy to provide you with this summary of yo Call the [employer] for assistance with your inactive [employer] account    If you have questions, you can call [phone] to talk to our [facility] Staff. Remember, [employer] is NOT to be used for urgent needs. For medical emergencies, dial 911.     Us

## (undated) NOTE — Clinical Note
[address] [facility]  [address], [address]  Julisa Breeding [phone]        Dear Soledad Gifford,      I had the pleasure of seeing your patient, Ethyl Gearing on 6/2/2017.      Below please find a summary of ou

## (undated) NOTE — LETTER
AUTHORIZATION FOR SURGICAL OPERATION OR OTHER PROCEDURE    1.  I hereby authorize Dr. Huong Engle, and [address] [place] [address] staff assigned to my case to perform the following operation and/or procedure at the [address] [place] [address]:    ____________________________ Patient Name:  ______________________________________________________  (please print)      Patient signature:  ___________________________________________________             Relationship to Patient:           []  Parent    Responsible person

## (undated) NOTE — LETTER
AUTHORIZATION FOR SURGICAL OPERATION OR OTHER PROCEDURE    1. I hereby authorize Dr. Marlen Leigh, and [facility] staff assigned to my case to perform the following operation and/or procedure at the [facility] site:    _______________________________________________________________________________________________    Right Heel Cortisone Injection   _______________________________________________________________________________________________    2.  My physician has explained the nature and purpose of the operation or other procedure, possible alternative methods of treatment, the risks involved, and the possibility of complication to me.  I acknowledge that no guarantee has been made as to the result that may be obtained.  3.  I recognize that, during the course of this operation, or other procedure, unforseen conditions may necessitate additional or different procedure than those listed above.  I, therefore, further authorize and request that the above named physician, his/her physician assistants or designees perform such procedures as are, in his/her professional opinion, necessary and desirable.  4.  Any tissue or organs removed in the operation or other procedure may be disposed of by and at the discretion of the [facility].  5.  I understand that in the event of a medical emergency, I will be transported by local paramedics to [facility] or other hospital emergency department.  6.  I certify that I have read and fully understand the above consent to operation and/or other procedure.    7.  I acknowledge that my physician has explained sedation/analgesia administration to me including the risks and benefits.  I consent to the administration of sedation/analgesia as may be necessary or desirable in the judgement of my physician.    Witness signature: ___________________________________________________ Date:   ______/______/_____                    Time:  ________ A.M.  P.M.       Patient Name:  ______________________________________________________  (please print)      Patient signature:  ___________________________________________________             Relationship to Patient:           []  Parent    Responsible person                          []  Spouse  In case of minor or                    [] Other  _____________   Incompetent name:  __________________________________________________                               (please print)      _____________      Responsible person  In case of minor or  Incompetent signature:  _______________________________________________    Statement of Physician  My signature below affirms that prior to the time of the procedure, I have explained to the patient and/or his/her guardian, the risks and benefits involved in the proposed treatment and any reasonable alternative to the proposed treatment.  I have also explained the risks and benefits involved in the refusal of the proposed treatment and have answered the patient's questions.                        Date:  ______/______/_______  Provider                      Signature:  __________________________________________________________       Time:  ___________ A.M    P.M.

## (undated) NOTE — LETTER
1/29/2021              [address]        [address] [address]         Dear Gurwinder Plunkett,    Please advise your dentist that you have had  a recently resolved COVID-19 infection and you are cur

## (undated) NOTE — LETTER
2/4/2025              Judd Montaño        [address]        [facility] [address]               Medical Grade Compression Hose        Patient: Judd Montaño      YOB: 1985           Diagnosis: Varicose Veins with Pain- Bilateral: I83.813       Compression: 30-40mmHg- Firm  Style: Thigh-High        Amount: X 2  HCPS: - Thigh High          Physician Signature: _____________________  Date:  02/04/25    Sincerely,    Sravanthi Sam MD

## (undated) NOTE — Clinical Note
Please schedule the patient for 2 separate EMG slots on different days as she need upper extremities and lower extremities tested.

## (undated) NOTE — LETTER
11/26/2018        To Whom It May Concern:    Miguel Ángel Pedroza is currently under my medical care and may not return to work at this time. Please excuse Sae Jalloh for 4 days. She may return to work on 11/30/18. Activity is restricted as follows: none.

## (undated) NOTE — LETTER
08/26/21        Paz Huffman  [address] [facility] [address]  [address] [address]      Dear Kitty Loredo,    Our records indicate that you have outstanding lab work and or testing that was ordered for you and has not yet been completed

## (undated) NOTE — ED AVS SNAPSHOT
[address]   MRN: [id number]    Department:  [facility] Emergency Department   Date of Visit:  8/19/2019           Disclosure     Insurance plans vary and the physician(s) referred by the ER may not be covered by your plan. CARE PHYSICIAN AT ONCE OR RETURN IMMEDIATELY TO THE EMERGENCY DEPARTMENT. If you have been prescribed any medication(s), please fill your prescription right away and begin taking the medication(s) as directed.   If you believe that any of the medications

## (undated) NOTE — MR AVS SNAPSHOT
[facility]  [address] [place] [address], [address] [place] [address]               Thank you for choosing us for your health care visit with Elton Smith. DO Kendall.   We are glad to serve you and happy to provide you with this summary Instructions:   To schedule a test at any [facility], call Central Scheduling at [phone], Monday through Friday between 7:30am to 6pm and on Saturday between 8am and 1pm. Evening and weekend appointments for your exam are a insurance company's guidelines for prior authorization for this test.  You may be held responsible for payment in full if proper authorization is not acquired.   Please contact the Patient Business Office at [phone] if you have any questions related to Carpal tunnel syndrome    Cubital canal compression syndrome      Instructions and Information about Your Health     None      Allergies as of May 02, 2017     Ibuprofen Shortness of Breath                Today's Vital Signs     BP Pulse Weight

## (undated) NOTE — LETTER
2/4/2025              Judd Mario Clarice Montaño        [address]        [facility] [address]         Dear Judd Mario,    I have reviewed your recent Venous Duplex US.  Sorry for the delay in communicating back.    We were able to successfully treat the leaky vein without any complication.    Please call our office if you have further questions about your test.      Sincerely,    Sravanthi Sam MD          Document electronically generated by:  Sravanthi Sam MD

## (undated) NOTE — LETTER
AUTHORIZATION FOR SURGICAL OPERATION OR OTHER PROCEDURE    1. I hereby authorize Dr. Robert Gr, and [place] [employer] [facility] staff assigned to my case to perform the following operation and/or procedure at the [facility]:    _______________________________________________________________________________________________      ___________________________IUD removal ____________________________________________________________________    2. My physician has explained the nature and purpose of the operation or other procedure, possible alternative methods of treatment, the risks involved, and the possibility of complication to me. I acknowledge that no guarantee has been made as to the result that may be obtained. 3.  I recognize that, during the course of this operation, or other procedure, unforseen conditions may necessitate additional or different procedure than those listed above. I, therefore, further authorize and request that the above named physician, his/her physician assistants or designees perform such procedures as are, in his/her professional opinion, necessary and desirable. 4.  Any tissue or organs removed in the operation or other procedure may be disposed of by and at the discretion of the [facility] and [facility]. 5.  I understand that in the event of a medical emergency, I will be transported by local paramedics to [facility] or other [facility] emergency department. 6.  I certify that I have read and fully understand the above consent to operation and/or other procedure. 7.  I acknowledge that my physician has explained sedation/analgesia administration to me including the risks and benefits. I consent to the administration of sedation/analgesia as may be necessary or desirable in the judgement of my physician. Witness signature: ___________________________________________________ Date:  ______/______/_____                    Time:  ________ A. M. P.M.       Patient Name:  ______________________________________________________  (please print)      Patient signature:  ___________________________________________________             Relationship to Patient:           []  Parent    Responsible person                          []  Spouse  In case of minor or                    [] Other  _____________   Incompetent name:  __________________________________________________                               (please print)      _____________      Responsible person  In case of minor or  Incompetent signature:  _______________________________________________    Statement of Physician  My signature below affirms that prior to the time of the procedure, I have explained to the patient and/or his/her guardian, the risks and benefits involved in the proposed treatment and any reasonable alternative to the proposed treatment. I have also explained the risks and benefits involved in the refusal of the proposed treatment and have answered the patient's questions.                         Date:  ______/______/_______  Provider                      Signature:  __________________________________________________________       Time:  ___________ A.M    P.M.

## (undated) NOTE — MR AVS SNAPSHOT
[address]  [address] [facility]  [phone]               Thank you for choosing us for your health care visit with Kamilla Barahona DO.   We are glad to serve you and happy to provide you with this summary Imaging:  US BREAST LEFT COMPLETE ([id number])    Instructions:   To schedule a test at any [facility], call Central Scheduling at [phone], Monday through Friday between 7:30am to 6pm and on Saturday between 8am and 1pm. E Dietary sodium reduction Reduce dietary sodium intake to <= 100 mmol per day (2.4 g sodium or 6 g sodium chloride)   Aerobic physical activity Regular aerobic physical activity (e.g., brisk walking, light jogging, cycling, swimming, etc.) for a goal of at [email or web address]

## (undated) NOTE — LETTER
AUTHORIZATION FOR SURGICAL OPERATION OR OTHER PROCEDURE    1. I hereby authorize Dr. Dale Paniagua, and [place] [facility] staff assigned to my case to perform the following operation and/or procedure at the [place] [facility]: IUD removal   _______________________________________________________________________________________________    2. My physician has explained the nature and purpose of the operation or other procedure, possible alternative methods of treatment, the risks involved, and the possibility of complication to me. I acknowledge that no guarantee has been made as to the result that may be obtained. 3.  I recognize that, during the course of this operation, or other procedure, unforseen conditions may necessitate additional or different procedure than those listed above. I, therefore, further authorize and request that the above named physician, his/her physician assistants or designees perform such procedures as are, in his/her professional opinion, necessary and desirable. 4.  Any tissue or organs removed in the operation or other procedure may be disposed of by and at the discretion of the [facility] and [facility]. 5.  I understand that in the event of a medical emergency, I will be transported by local paramedics to [facility] or other hospital emergency department. 6.  I certify that I have read and fully understand the above consent to operation and/or other procedure. 7.  I acknowledge that my physician has explained sedation/analgesia administration to me including the risks and benefits. I consent to the administration of sedation/analgesia as may be necessary or desirable in the judgement of my physician. Witness signature: ___________________________________________________ Date:  _4__/_07__/22___                    Time:  ________ A. M.  P.M.        Patient Name:  ______________________________________________________  (please print)      Patient signature:  ___________________________________________________             Relationship to Patient:           []  Parent    Responsible person                          []  Spouse  In case of minor or                    [] Other  _____________   Incompetent name:  __________________________________________________                               (please print)      _____________      Responsible person  In case of minor or  Incompetent signature:  _______________________________________________    Statement of Physician  My signature below affirms that prior to the time of the procedure, I have explained to the patient and/or his/her guardian, the risks and benefits involved in the proposed treatment and any reasonable alternative to the proposed treatment. I have also explained the risks and benefits involved in the refusal of the proposed treatment and have answered the patient's questions.                         Date:  ______/______/_______  Provider                      Signature:  __________________________________________________________       Time:  ___________ A.M    P.M.

## (undated) NOTE — ED AVS SNAPSHOT
Octavio Favre   MRN: [id number]    Department:  [facility] Emergency Department   Date of Visit:  6/26/2019           Disclosure     Insurance plans vary and the physician(s) referred by the ER may not be covered by your plan.  Please conta CARE PHYSICIAN AT ONCE OR RETURN IMMEDIATELY TO THE EMERGENCY DEPARTMENT. If you have been prescribed any medication(s), please fill your prescription right away and begin taking the medication(s) as directed.   If you believe that any of the medications

## (undated) NOTE — LETTER
09/06/19    [facility] [place]  [address] [facility] [address]  [address] [address]      Dear Terence Maria [facility] Health[facility]ladimir,    [facility] records indicate that you have outstanding lab work and or testing that was ordered for you and has not yet been completed:  Orders Place

## (undated) NOTE — LETTER
10/23/2024              Judd Montaño        [address]        [facility] [address]         Dear Judd Mario,    Medical Grade Compression Hose        Patient: Judd Montaño      YOB: 1985           Diagnosis: Varicose Veins with Pain- Bilateral: I83.813       Compression: 20-30mmHg- Moderate  Style: Thigh-High        Amount: X 2  HCPS: - Thigh High          Physician Signature: _____________________  Date:  10/23/24    Sincerely,    Meghan Rizo, APRN